# Patient Record
Sex: FEMALE | Race: WHITE | NOT HISPANIC OR LATINO | Employment: UNEMPLOYED | URBAN - NONMETROPOLITAN AREA
[De-identification: names, ages, dates, MRNs, and addresses within clinical notes are randomized per-mention and may not be internally consistent; named-entity substitution may affect disease eponyms.]

---

## 2020-05-07 ENCOUNTER — HISTORICAL (OUTPATIENT)
Dept: ADMINISTRATIVE | Facility: HOSPITAL | Age: 85
End: 2020-05-07

## 2020-05-14 LAB
ALBUMIN SERPL BCP-MCNC: 3.5 G/DL (ref 3.5–5)
ANION GAP SERPL CALC-SCNC: 8.7 MEQ/L (ref 10–20)
BUN SERPL-MCNC: 42 MG/DL (ref 7–18)
CALCIUM SERPL-MCNC: 8.8 MG/DL (ref 8.5–10.1)
CHLORIDE SERPL-SCNC: 111 MMOL/L (ref 98–107)
CHOLEST SERPL-MSCNC: 165 MG/DL (ref 0–200)
CHOLEST/HDLC SERPL: 3.4 {RATIO}
CO2 SERPL-SCNC: 27 MMOL/L (ref 22–32)
CREAT SERPL-MCNC: 1.15 MG/DL (ref 0.55–1.02)
EGFR: 48 ML/MIN/1.73M
GLUCOSE SERPL-MCNC: 151 MG/DL (ref 70–99)
HDL/CHOLESTEROL RATIO: 49 MG/DL (ref 40–60)
LDLC SERPL CALC-MCNC: 91 MG/DL (ref 0–130)
OSMOC: 297 MOSM/KG (ref 275–295)
PHOSPHATE FLD-MCNC: 4.02 MG/DL (ref 2.5–4.9)
POTASSIUM SERPL-SCNC: 4.7 MMOL/L (ref 3.5–5.1)
SODIUM BLD-SCNC: 142 MMOL/L (ref 136–145)
TRIGL SERPL-MCNC: 124 MG/ML (ref 30–150)

## 2020-06-21 ENCOUNTER — HISTORICAL (OUTPATIENT)
Dept: ADMINISTRATIVE | Facility: HOSPITAL | Age: 85
End: 2020-06-21

## 2020-06-21 LAB
APPEARANCE, UA: CLEAR
BACTERIA SPEC CULT: NORMAL /HPF
BILIRUB UR QL STRIP: NEGATIVE MG/DL
BUDDING YEAST: NORMAL /HPF
CASTS, URINE MICROSCOPIC: 6 /LPF
COLOR UR: YELLOW
EPITHELIAL, URINE MICROSCOPIC: NEGATIVE /HPF
GLUCOSE (UA): NEGATIVE MG/DL
HGB UR QL STRIP: NEGATIVE ERY/UL
KETONES UR QL STRIP: NEGATIVE MG/DL
LEUKOCYTE ESTERASE UR QL STRIP: NORMAL LEU/UL
NITRITE UR QL STRIP: NEGATIVE
PH UR STRIP: 5 [PH] (ref 5–7.5)
PROT UR QL STRIP: NEGATIVE MG/DL
RBC #/AREA URNS HPF: NEGATIVE /HPF
SP GR UR STRIP: 1.02 (ref 1–1.03)
SPERM, URINE MICROSCOPIC: NORMAL /HPF
TYPE OF SPECIMEN  (UA): NORMAL
UNCLASSIFIED CRYSTALS, UA: NORMAL /HPF
UROBILINOGEN UR STRIP-ACNC: 1 EU/L
WBC #/AREA URNS HPF: 90 /HPF

## 2020-06-23 LAB
URINE CULTURE, ROUTINE: NORMAL

## 2020-07-09 LAB — SARS-COV-2 RNA RESP QL NAA+PROBE: DETECTED

## 2020-08-28 ENCOUNTER — LAB VISIT (OUTPATIENT)
Dept: LAB | Facility: HOSPITAL | Age: 85
End: 2020-08-28
Attending: INTERNAL MEDICINE
Payer: MEDICARE

## 2020-08-28 DIAGNOSIS — R73.09 IMPAIRED GLUCOSE TOLERANCE TEST: Primary | ICD-10-CM

## 2020-08-28 LAB
ESTIMATED AVG GLUCOSE: 171 MG/DL (ref 68–131)
HBA1C MFR BLD HPLC: 7.6 % (ref 4–5.6)

## 2020-08-28 PROCEDURE — 36415 COLL VENOUS BLD VENIPUNCTURE: CPT

## 2020-08-28 PROCEDURE — 83036 HEMOGLOBIN GLYCOSYLATED A1C: CPT

## 2020-10-12 PROCEDURE — 87186 SC STD MICRODIL/AGAR DIL: CPT

## 2020-10-12 PROCEDURE — 87088 URINE BACTERIA CULTURE: CPT

## 2020-10-12 PROCEDURE — 87086 URINE CULTURE/COLONY COUNT: CPT

## 2020-10-12 PROCEDURE — 87077 CULTURE AEROBIC IDENTIFY: CPT

## 2020-10-12 PROCEDURE — 81000 URINALYSIS NONAUTO W/SCOPE: CPT

## 2020-10-13 ENCOUNTER — APPOINTMENT (OUTPATIENT)
Dept: LAB | Facility: HOSPITAL | Age: 85
End: 2020-10-13
Attending: INTERNAL MEDICINE
Payer: MEDICAID

## 2020-10-13 DIAGNOSIS — N39.0 URINARY TRACT INFECTION, SITE NOT SPECIFIED: Primary | ICD-10-CM

## 2020-10-13 LAB
BACTERIA #/AREA URNS HPF: ABNORMAL /HPF
BILIRUB UR QL STRIP: NEGATIVE
CLARITY UR: ABNORMAL
COLOR UR: YELLOW
GLUCOSE UR QL STRIP: NEGATIVE
HGB UR QL STRIP: NEGATIVE
HYALINE CASTS #/AREA URNS LPF: 3 /LPF
KETONES UR QL STRIP: NEGATIVE
LEUKOCYTE ESTERASE UR QL STRIP: ABNORMAL
MICROSCOPIC COMMENT: ABNORMAL
NITRITE UR QL STRIP: NEGATIVE
PH UR STRIP: 6 [PH] (ref 5–8)
PROT UR QL STRIP: NEGATIVE
RBC #/AREA URNS HPF: 3 /HPF (ref 0–4)
SP GR UR STRIP: 1.02 (ref 1–1.03)
SQUAMOUS #/AREA URNS HPF: 2 /HPF
URN SPEC COLLECT METH UR: ABNORMAL
UROBILINOGEN UR STRIP-ACNC: 1 EU/DL
WBC #/AREA URNS HPF: 60 /HPF (ref 0–5)

## 2020-10-15 LAB — BACTERIA UR CULT: ABNORMAL

## 2021-08-03 ENCOUNTER — APPOINTMENT (OUTPATIENT)
Dept: LAB | Facility: HOSPITAL | Age: 86
End: 2021-08-03
Attending: INTERNAL MEDICINE
Payer: MEDICARE

## 2021-08-03 DIAGNOSIS — N39.0 URINARY TRACT INFECTION, SITE NOT SPECIFIED: Primary | ICD-10-CM

## 2021-08-03 LAB
BACTERIA #/AREA URNS HPF: ABNORMAL /HPF
BILIRUB UR QL STRIP: NEGATIVE
CLARITY UR: ABNORMAL
COLOR UR: YELLOW
GLUCOSE UR QL STRIP: NEGATIVE
HGB UR QL STRIP: ABNORMAL
HYALINE CASTS #/AREA URNS LPF: 0 /LPF
KETONES UR QL STRIP: NEGATIVE
LEUKOCYTE ESTERASE UR QL STRIP: ABNORMAL
MICROSCOPIC COMMENT: ABNORMAL
NITRITE UR QL STRIP: NEGATIVE
PH UR STRIP: 6 [PH] (ref 5–8)
PROT UR QL STRIP: NEGATIVE
RBC #/AREA URNS HPF: 1 /HPF (ref 0–4)
SP GR UR STRIP: 1.01 (ref 1–1.03)
SQUAMOUS #/AREA URNS HPF: 1 /HPF
URN SPEC COLLECT METH UR: ABNORMAL
UROBILINOGEN UR STRIP-ACNC: NEGATIVE EU/DL
WBC #/AREA URNS HPF: >100 /HPF (ref 0–5)

## 2021-08-03 PROCEDURE — 87086 URINE CULTURE/COLONY COUNT: CPT | Performed by: INTERNAL MEDICINE

## 2021-08-03 PROCEDURE — 87186 SC STD MICRODIL/AGAR DIL: CPT | Performed by: INTERNAL MEDICINE

## 2021-08-03 PROCEDURE — 81000 URINALYSIS NONAUTO W/SCOPE: CPT | Performed by: INTERNAL MEDICINE

## 2021-08-03 PROCEDURE — 87088 URINE BACTERIA CULTURE: CPT | Performed by: INTERNAL MEDICINE

## 2021-08-03 PROCEDURE — 87077 CULTURE AEROBIC IDENTIFY: CPT | Performed by: INTERNAL MEDICINE

## 2021-08-06 LAB — BACTERIA UR CULT: ABNORMAL

## 2021-12-24 ENCOUNTER — HOSPITAL ENCOUNTER (INPATIENT)
Facility: HOSPITAL | Age: 86
LOS: 10 days | Discharge: HOME | DRG: 193 | End: 2022-01-03
Attending: STUDENT IN AN ORGANIZED HEALTH CARE EDUCATION/TRAINING PROGRAM | Admitting: EMERGENCY MEDICINE
Payer: MEDICARE

## 2021-12-24 DIAGNOSIS — N30.00 ACUTE CYSTITIS WITHOUT HEMATURIA: ICD-10-CM

## 2021-12-24 DIAGNOSIS — N30.01 ACUTE CYSTITIS WITH HEMATURIA: ICD-10-CM

## 2021-12-24 DIAGNOSIS — R06.02 SOB (SHORTNESS OF BREATH): ICD-10-CM

## 2021-12-24 DIAGNOSIS — J90 PLEURAL EFFUSION: ICD-10-CM

## 2021-12-24 DIAGNOSIS — G93.40 ACUTE ENCEPHALOPATHY: Primary | ICD-10-CM

## 2021-12-24 DIAGNOSIS — G93.40 ENCEPHALOPATHY: ICD-10-CM

## 2021-12-24 LAB
ALBUMIN SERPL BCP-MCNC: 3 G/DL (ref 3.5–5.2)
ALP SERPL-CCNC: 105 U/L (ref 55–135)
ALT SERPL W/O P-5'-P-CCNC: 23 U/L (ref 10–44)
ANION GAP SERPL CALC-SCNC: 9 MMOL/L (ref 8–16)
AST SERPL-CCNC: 16 U/L (ref 10–40)
BACTERIA #/AREA URNS HPF: NEGATIVE /HPF
BASOPHILS # BLD AUTO: 0.03 K/UL (ref 0–0.2)
BASOPHILS NFR BLD: 0.4 % (ref 0–1.9)
BILIRUB SERPL-MCNC: 0.4 MG/DL (ref 0.1–1)
BILIRUB UR QL STRIP: NEGATIVE
BUN SERPL-MCNC: 45 MG/DL (ref 8–23)
CALCIUM SERPL-MCNC: 8.5 MG/DL (ref 8.7–10.5)
CHLORIDE SERPL-SCNC: 113 MMOL/L (ref 95–110)
CLARITY UR: ABNORMAL
CO2 SERPL-SCNC: 29 MMOL/L (ref 23–29)
COLOR UR: YELLOW
CREAT SERPL-MCNC: 1.4 MG/DL (ref 0.5–1.4)
CTP QC/QA: YES
CTP QC/QA: YES
DIFFERENTIAL METHOD: ABNORMAL
EOSINOPHIL # BLD AUTO: 0.1 K/UL (ref 0–0.5)
EOSINOPHIL NFR BLD: 1 % (ref 0–8)
ERYTHROCYTE [DISTWIDTH] IN BLOOD BY AUTOMATED COUNT: 13.5 % (ref 11.5–14.5)
EST. GFR  (AFRICAN AMERICAN): 39.8 ML/MIN/1.73 M^2
EST. GFR  (NON AFRICAN AMERICAN): 34.5 ML/MIN/1.73 M^2
GLUCOSE SERPL-MCNC: 92 MG/DL (ref 70–110)
GLUCOSE UR QL STRIP: NEGATIVE
HCT VFR BLD AUTO: 36.3 % (ref 37–48.5)
HGB BLD-MCNC: 10.8 G/DL (ref 12–16)
HGB UR QL STRIP: ABNORMAL
HYALINE CASTS #/AREA URNS LPF: 7 /LPF
IMM GRANULOCYTES # BLD AUTO: 0.04 K/UL (ref 0–0.04)
IMM GRANULOCYTES NFR BLD AUTO: 0.6 % (ref 0–0.5)
KETONES UR QL STRIP: NEGATIVE
LACTATE SERPL-SCNC: 1 MMOL/L (ref 0.5–2.2)
LEUKOCYTE ESTERASE UR QL STRIP: ABNORMAL
LYMPHOCYTES # BLD AUTO: 2 K/UL (ref 1–4.8)
LYMPHOCYTES NFR BLD: 28.2 % (ref 18–48)
MAGNESIUM SERPL-MCNC: 2 MG/DL (ref 1.6–2.6)
MCH RBC QN AUTO: 29.7 PG (ref 27–31)
MCHC RBC AUTO-ENTMCNC: 29.8 G/DL (ref 32–36)
MCV RBC AUTO: 100 FL (ref 82–98)
MICROSCOPIC COMMENT: ABNORMAL
MONOCYTES # BLD AUTO: 0.6 K/UL (ref 0.3–1)
MONOCYTES NFR BLD: 8.7 % (ref 4–15)
NEUTROPHILS # BLD AUTO: 4.3 K/UL (ref 1.8–7.7)
NEUTROPHILS NFR BLD: 61.1 % (ref 38–73)
NITRITE UR QL STRIP: NEGATIVE
NRBC BLD-RTO: 0 /100 WBC
NT-PROBNP SERPL-MCNC: 631 PG/ML (ref 5–1800)
PH UR STRIP: 7 [PH] (ref 5–8)
PHOSPHATE SERPL-MCNC: 4.3 MG/DL (ref 2.7–4.5)
PLATELET # BLD AUTO: 164 K/UL (ref 150–450)
PMV BLD AUTO: 11.1 FL (ref 9.2–12.9)
POC MOLECULAR INFLUENZA A AGN: NEGATIVE
POC MOLECULAR INFLUENZA B AGN: NEGATIVE
POCT GLUCOSE: 237 MG/DL (ref 70–110)
POCT GLUCOSE: 284 MG/DL (ref 70–110)
POTASSIUM SERPL-SCNC: 6 MMOL/L (ref 3.5–5.1)
PROT SERPL-MCNC: 6.5 G/DL (ref 6–8.4)
PROT UR QL STRIP: NEGATIVE
RBC # BLD AUTO: 3.64 M/UL (ref 4–5.4)
RBC #/AREA URNS HPF: 27 /HPF (ref 0–4)
SARS-COV-2 RDRP RESP QL NAA+PROBE: NEGATIVE
SODIUM SERPL-SCNC: 151 MMOL/L (ref 136–145)
SP GR UR STRIP: 1.01 (ref 1–1.03)
SQUAMOUS #/AREA URNS HPF: 2 /HPF
TROPONIN I SERPL DL<=0.01 NG/ML-MCNC: 17.6 PG/ML (ref 0–60)
URN SPEC COLLECT METH UR: ABNORMAL
UROBILINOGEN UR STRIP-ACNC: 1 EU/DL
WBC # BLD AUTO: 7.03 K/UL (ref 3.9–12.7)
WBC #/AREA URNS HPF: >100 /HPF (ref 0–5)

## 2021-12-24 PROCEDURE — 25000242 PHARM REV CODE 250 ALT 637 W/ HCPCS: Performed by: STUDENT IN AN ORGANIZED HEALTH CARE EDUCATION/TRAINING PROGRAM

## 2021-12-24 PROCEDURE — 93010 EKG 12-LEAD: ICD-10-PCS | Mod: ,,, | Performed by: INTERNAL MEDICINE

## 2021-12-24 PROCEDURE — 85025 COMPLETE CBC W/AUTO DIFF WBC: CPT | Performed by: STUDENT IN AN ORGANIZED HEALTH CARE EDUCATION/TRAINING PROGRAM

## 2021-12-24 PROCEDURE — 25000003 PHARM REV CODE 250: Performed by: INTERNAL MEDICINE

## 2021-12-24 PROCEDURE — 63600175 PHARM REV CODE 636 W HCPCS: Performed by: STUDENT IN AN ORGANIZED HEALTH CARE EDUCATION/TRAINING PROGRAM

## 2021-12-24 PROCEDURE — 83735 ASSAY OF MAGNESIUM: CPT | Performed by: STUDENT IN AN ORGANIZED HEALTH CARE EDUCATION/TRAINING PROGRAM

## 2021-12-24 PROCEDURE — 25000003 PHARM REV CODE 250: Performed by: STUDENT IN AN ORGANIZED HEALTH CARE EDUCATION/TRAINING PROGRAM

## 2021-12-24 PROCEDURE — U0002 COVID-19 LAB TEST NON-CDC: HCPCS | Performed by: STUDENT IN AN ORGANIZED HEALTH CARE EDUCATION/TRAINING PROGRAM

## 2021-12-24 PROCEDURE — 27000190 HC CPAP FULL FACE MASK W/VALVE

## 2021-12-24 PROCEDURE — 99900031 HC PATIENT EDUCATION (STAT)

## 2021-12-24 PROCEDURE — 80053 COMPREHEN METABOLIC PANEL: CPT | Performed by: STUDENT IN AN ORGANIZED HEALTH CARE EDUCATION/TRAINING PROGRAM

## 2021-12-24 PROCEDURE — 11000001 HC ACUTE MED/SURG PRIVATE ROOM

## 2021-12-24 PROCEDURE — 83880 ASSAY OF NATRIURETIC PEPTIDE: CPT | Performed by: STUDENT IN AN ORGANIZED HEALTH CARE EDUCATION/TRAINING PROGRAM

## 2021-12-24 PROCEDURE — 96375 TX/PRO/DX INJ NEW DRUG ADDON: CPT

## 2021-12-24 PROCEDURE — 84100 ASSAY OF PHOSPHORUS: CPT | Performed by: STUDENT IN AN ORGANIZED HEALTH CARE EDUCATION/TRAINING PROGRAM

## 2021-12-24 PROCEDURE — 99285 EMERGENCY DEPT VISIT HI MDM: CPT | Mod: 25

## 2021-12-24 PROCEDURE — 96367 TX/PROPH/DG ADDL SEQ IV INF: CPT

## 2021-12-24 PROCEDURE — 93005 ELECTROCARDIOGRAM TRACING: CPT

## 2021-12-24 PROCEDURE — 87086 URINE CULTURE/COLONY COUNT: CPT | Performed by: STUDENT IN AN ORGANIZED HEALTH CARE EDUCATION/TRAINING PROGRAM

## 2021-12-24 PROCEDURE — 94640 AIRWAY INHALATION TREATMENT: CPT

## 2021-12-24 PROCEDURE — 99900035 HC TECH TIME PER 15 MIN (STAT)

## 2021-12-24 PROCEDURE — 93010 ELECTROCARDIOGRAM REPORT: CPT | Mod: ,,, | Performed by: INTERNAL MEDICINE

## 2021-12-24 PROCEDURE — 81000 URINALYSIS NONAUTO W/SCOPE: CPT | Performed by: STUDENT IN AN ORGANIZED HEALTH CARE EDUCATION/TRAINING PROGRAM

## 2021-12-24 PROCEDURE — 94761 N-INVAS EAR/PLS OXIMETRY MLT: CPT

## 2021-12-24 PROCEDURE — 36415 COLL VENOUS BLD VENIPUNCTURE: CPT | Performed by: STUDENT IN AN ORGANIZED HEALTH CARE EDUCATION/TRAINING PROGRAM

## 2021-12-24 PROCEDURE — 96365 THER/PROPH/DIAG IV INF INIT: CPT

## 2021-12-24 PROCEDURE — 82803 BLOOD GASES ANY COMBINATION: CPT

## 2021-12-24 PROCEDURE — P9612 CATHETERIZE FOR URINE SPEC: HCPCS

## 2021-12-24 PROCEDURE — C9399 UNCLASSIFIED DRUGS OR BIOLOG: HCPCS | Performed by: INTERNAL MEDICINE

## 2021-12-24 PROCEDURE — 84484 ASSAY OF TROPONIN QUANT: CPT | Performed by: STUDENT IN AN ORGANIZED HEALTH CARE EDUCATION/TRAINING PROGRAM

## 2021-12-24 PROCEDURE — 27000221 HC OXYGEN, UP TO 24 HOURS

## 2021-12-24 RX ORDER — BISACODYL 5 MG
5 TABLET, DELAYED RELEASE (ENTERIC COATED) ORAL DAILY PRN
Status: DISCONTINUED | OUTPATIENT
Start: 2021-12-24 | End: 2022-01-03 | Stop reason: HOSPADM

## 2021-12-24 RX ORDER — SPIRONOLACTONE 50 MG/1
50 TABLET, FILM COATED ORAL 2 TIMES DAILY
Status: ON HOLD | COMMUNITY
End: 2022-01-03 | Stop reason: HOSPADM

## 2021-12-24 RX ORDER — ACETAMINOPHEN 325 MG/1
650 TABLET ORAL 2 TIMES DAILY
COMMUNITY

## 2021-12-24 RX ORDER — INSULIN LISPRO 100 [IU]/ML
INJECTION, SOLUTION INTRAVENOUS; SUBCUTANEOUS
Status: ON HOLD | COMMUNITY
End: 2022-01-01 | Stop reason: HOSPADM

## 2021-12-24 RX ORDER — VALACYCLOVIR HYDROCHLORIDE 500 MG/1
500 TABLET, FILM COATED ORAL 2 TIMES DAILY
Status: DISCONTINUED | OUTPATIENT
Start: 2021-12-24 | End: 2021-12-27

## 2021-12-24 RX ORDER — FAMOTIDINE 10 MG/ML
20 INJECTION INTRAVENOUS EVERY 12 HOURS
Status: DISCONTINUED | OUTPATIENT
Start: 2021-12-24 | End: 2021-12-25

## 2021-12-24 RX ORDER — OXYCODONE AND ACETAMINOPHEN 5; 325 MG/1; MG/1
1 TABLET ORAL EVERY 4 HOURS PRN
Status: DISCONTINUED | OUTPATIENT
Start: 2021-12-24 | End: 2022-01-03 | Stop reason: HOSPADM

## 2021-12-24 RX ORDER — SERTRALINE HYDROCHLORIDE 50 MG/1
50 TABLET, FILM COATED ORAL DAILY
Status: ON HOLD | COMMUNITY
End: 2022-01-01 | Stop reason: HOSPADM

## 2021-12-24 RX ORDER — LOPERAMIDE HYDROCHLORIDE 2 MG/1
2 CAPSULE ORAL 4 TIMES DAILY
Status: DISCONTINUED | OUTPATIENT
Start: 2021-12-24 | End: 2021-12-27

## 2021-12-24 RX ORDER — GLUCAGON 1 MG
1 KIT INJECTION
Status: DISCONTINUED | OUTPATIENT
Start: 2021-12-24 | End: 2022-01-03 | Stop reason: HOSPADM

## 2021-12-24 RX ORDER — FENTANYL 50 UG/1
1 PATCH TRANSDERMAL
Status: DISCONTINUED | OUTPATIENT
Start: 2021-12-24 | End: 2022-01-03 | Stop reason: HOSPADM

## 2021-12-24 RX ORDER — SIMETHICONE 80 MG
1 TABLET,CHEWABLE ORAL 3 TIMES DAILY PRN
Status: DISCONTINUED | OUTPATIENT
Start: 2021-12-24 | End: 2021-12-24

## 2021-12-24 RX ORDER — LUBIPROSTONE 8 UG/1
8 CAPSULE ORAL 2 TIMES DAILY WITH MEALS
Status: DISCONTINUED | OUTPATIENT
Start: 2021-12-24 | End: 2022-01-03 | Stop reason: HOSPADM

## 2021-12-24 RX ORDER — ONDANSETRON 2 MG/ML
4 INJECTION INTRAMUSCULAR; INTRAVENOUS EVERY 8 HOURS PRN
Status: DISCONTINUED | OUTPATIENT
Start: 2021-12-24 | End: 2022-01-03 | Stop reason: HOSPADM

## 2021-12-24 RX ORDER — IBUPROFEN 200 MG
16 TABLET ORAL
Status: DISCONTINUED | OUTPATIENT
Start: 2021-12-24 | End: 2022-01-03 | Stop reason: HOSPADM

## 2021-12-24 RX ORDER — FENTANYL 50 UG/1
PATCH TRANSDERMAL
Status: DISPENSED
Start: 2021-12-24 | End: 2021-12-25

## 2021-12-24 RX ORDER — LEVOTHYROXINE SODIUM 125 UG/1
125 TABLET ORAL
Status: DISCONTINUED | OUTPATIENT
Start: 2021-12-25 | End: 2022-01-03 | Stop reason: HOSPADM

## 2021-12-24 RX ORDER — TALC
6 POWDER (GRAM) TOPICAL NIGHTLY PRN
Status: DISCONTINUED | OUTPATIENT
Start: 2021-12-24 | End: 2022-01-03 | Stop reason: HOSPADM

## 2021-12-24 RX ORDER — OXYCODONE AND ACETAMINOPHEN 5; 325 MG/1; MG/1
1 TABLET ORAL EVERY 4 HOURS PRN
Status: ON HOLD | COMMUNITY
End: 2022-01-01 | Stop reason: HOSPADM

## 2021-12-24 RX ORDER — ASPIRIN 81 MG/1
81 TABLET ORAL DAILY
Status: ON HOLD | COMMUNITY
End: 2022-01-01 | Stop reason: HOSPADM

## 2021-12-24 RX ORDER — LUBIPROSTONE 8 UG/1
8 CAPSULE ORAL 2 TIMES DAILY WITH MEALS
Status: ON HOLD | COMMUNITY
End: 2022-01-01 | Stop reason: HOSPADM

## 2021-12-24 RX ORDER — VALACYCLOVIR HYDROCHLORIDE 500 MG/1
500 TABLET, FILM COATED ORAL 2 TIMES DAILY
Status: ON HOLD | COMMUNITY
End: 2022-01-03 | Stop reason: HOSPADM

## 2021-12-24 RX ORDER — ATORVASTATIN CALCIUM 10 MG/1
10 TABLET, FILM COATED ORAL DAILY
Status: DISCONTINUED | OUTPATIENT
Start: 2021-12-25 | End: 2022-01-03 | Stop reason: HOSPADM

## 2021-12-24 RX ORDER — AMOXICILLIN 875 MG/1
875 TABLET, FILM COATED ORAL 2 TIMES DAILY
Status: ON HOLD | COMMUNITY
End: 2022-01-03 | Stop reason: HOSPADM

## 2021-12-24 RX ORDER — ALBUTEROL SULFATE 0.83 MG/ML
SOLUTION RESPIRATORY (INHALATION)
COMMUNITY
Start: 2021-12-21 | End: 2022-01-01

## 2021-12-24 RX ORDER — SERTRALINE HYDROCHLORIDE 50 MG/1
50 TABLET, FILM COATED ORAL DAILY
Status: DISCONTINUED | OUTPATIENT
Start: 2021-12-25 | End: 2021-12-27

## 2021-12-24 RX ORDER — GUAIFENESIN 100 MG/5ML
200 SOLUTION ORAL 3 TIMES DAILY PRN
Status: DISCONTINUED | OUTPATIENT
Start: 2021-12-24 | End: 2021-12-26

## 2021-12-24 RX ORDER — NITROFURANTOIN MACROCRYSTALS 50 MG/1
50 CAPSULE ORAL DAILY
Status: ON HOLD | COMMUNITY
End: 2022-01-01 | Stop reason: HOSPADM

## 2021-12-24 RX ORDER — FENTANYL 50 UG/1
1 PATCH TRANSDERMAL
COMMUNITY
End: 2022-03-21 | Stop reason: SDUPTHER

## 2021-12-24 RX ORDER — ACETAMINOPHEN 325 MG/1
650 TABLET ORAL EVERY 8 HOURS PRN
Status: DISCONTINUED | OUTPATIENT
Start: 2021-12-24 | End: 2022-01-03 | Stop reason: HOSPADM

## 2021-12-24 RX ORDER — SIMETHICONE 80 MG
1 TABLET,CHEWABLE ORAL 3 TIMES DAILY PRN
Status: DISCONTINUED | OUTPATIENT
Start: 2021-12-24 | End: 2022-01-03 | Stop reason: HOSPADM

## 2021-12-24 RX ORDER — BISACODYL 5 MG
5 TABLET, DELAYED RELEASE (ENTERIC COATED) ORAL DAILY PRN
COMMUNITY

## 2021-12-24 RX ORDER — FLUTICASONE PROPIONATE 50 MCG
1 SPRAY, SUSPENSION (ML) NASAL DAILY
Status: ON HOLD | COMMUNITY
End: 2022-01-01 | Stop reason: HOSPADM

## 2021-12-24 RX ORDER — PREGABALIN 50 MG/1
50 CAPSULE ORAL 3 TIMES DAILY
Status: DISCONTINUED | OUTPATIENT
Start: 2021-12-24 | End: 2021-12-27

## 2021-12-24 RX ORDER — SPIRONOLACTONE 25 MG/1
50 TABLET ORAL 2 TIMES DAILY
Status: DISCONTINUED | OUTPATIENT
Start: 2021-12-24 | End: 2021-12-27

## 2021-12-24 RX ORDER — LEVOTHYROXINE SODIUM 125 UG/1
150 TABLET ORAL
COMMUNITY

## 2021-12-24 RX ORDER — PRAMIPEXOLE DIHYDROCHLORIDE 1 MG/1
1 TABLET ORAL 3 TIMES DAILY
Status: ON HOLD | COMMUNITY
End: 2022-01-01 | Stop reason: HOSPADM

## 2021-12-24 RX ORDER — FUROSEMIDE 40 MG/1
40 TABLET ORAL 2 TIMES DAILY
Status: ON HOLD | COMMUNITY
End: 2022-01-01 | Stop reason: HOSPADM

## 2021-12-24 RX ORDER — MIRTAZAPINE 7.5 MG/1
7.5 TABLET, FILM COATED ORAL NIGHTLY
Status: DISCONTINUED | OUTPATIENT
Start: 2021-12-24 | End: 2022-01-03 | Stop reason: HOSPADM

## 2021-12-24 RX ORDER — DONEPEZIL HYDROCHLORIDE 10 MG/1
10 TABLET, FILM COATED ORAL NIGHTLY
COMMUNITY

## 2021-12-24 RX ORDER — GUAIFENESIN 100 MG/5ML
200 SOLUTION ORAL 3 TIMES DAILY PRN
Status: ON HOLD | COMMUNITY
End: 2022-01-01 | Stop reason: HOSPADM

## 2021-12-24 RX ORDER — SIMVASTATIN 20 MG/1
20 TABLET, FILM COATED ORAL NIGHTLY
COMMUNITY

## 2021-12-24 RX ORDER — INSULIN ASPART 100 [IU]/ML
1-10 INJECTION, SOLUTION INTRAVENOUS; SUBCUTANEOUS
Status: DISCONTINUED | OUTPATIENT
Start: 2021-12-24 | End: 2022-01-03 | Stop reason: HOSPADM

## 2021-12-24 RX ORDER — ALBUTEROL SULFATE 2.5 MG/.5ML
2.5 SOLUTION RESPIRATORY (INHALATION)
Status: DISCONTINUED | OUTPATIENT
Start: 2021-12-24 | End: 2022-01-03 | Stop reason: HOSPADM

## 2021-12-24 RX ORDER — ENOXAPARIN SODIUM 100 MG/ML
40 INJECTION SUBCUTANEOUS EVERY 24 HOURS
Status: DISCONTINUED | OUTPATIENT
Start: 2021-12-24 | End: 2022-01-03 | Stop reason: HOSPADM

## 2021-12-24 RX ORDER — PRAMIPEXOLE DIHYDROCHLORIDE 0.5 MG/1
1 TABLET ORAL 3 TIMES DAILY
Status: DISCONTINUED | OUTPATIENT
Start: 2021-12-24 | End: 2021-12-27

## 2021-12-24 RX ORDER — PANTOPRAZOLE SODIUM 40 MG/1
40 TABLET, DELAYED RELEASE ORAL DAILY
COMMUNITY

## 2021-12-24 RX ORDER — LOPERAMIDE HCL 2 MG
2 TABLET ORAL 4 TIMES DAILY PRN
Status: ON HOLD | COMMUNITY
End: 2022-01-01 | Stop reason: HOSPADM

## 2021-12-24 RX ORDER — ASPIRIN 81 MG/1
81 TABLET ORAL DAILY
Status: DISCONTINUED | OUTPATIENT
Start: 2021-12-25 | End: 2022-01-03 | Stop reason: HOSPADM

## 2021-12-24 RX ORDER — MIRTAZAPINE 7.5 MG/1
7.5 TABLET, FILM COATED ORAL NIGHTLY
Status: ON HOLD | COMMUNITY
End: 2022-01-01 | Stop reason: HOSPADM

## 2021-12-24 RX ORDER — IBUPROFEN 200 MG
24 TABLET ORAL
Status: DISCONTINUED | OUTPATIENT
Start: 2021-12-24 | End: 2022-01-03 | Stop reason: HOSPADM

## 2021-12-24 RX ORDER — SODIUM CHLORIDE 0.9 % (FLUSH) 0.9 %
10 SYRINGE (ML) INJECTION
Status: DISCONTINUED | OUTPATIENT
Start: 2021-12-24 | End: 2022-01-03 | Stop reason: HOSPADM

## 2021-12-24 RX ORDER — IPRATROPIUM BROMIDE AND ALBUTEROL SULFATE 2.5; .5 MG/3ML; MG/3ML
3 SOLUTION RESPIRATORY (INHALATION)
Status: COMPLETED | OUTPATIENT
Start: 2021-12-24 | End: 2021-12-24

## 2021-12-24 RX ORDER — ALBUTEROL SULFATE 0.83 MG/ML
2.5 SOLUTION RESPIRATORY (INHALATION) EVERY 6 HOURS
Status: DISCONTINUED | OUTPATIENT
Start: 2021-12-24 | End: 2021-12-24

## 2021-12-24 RX ORDER — ACETAMINOPHEN 325 MG/1
650 TABLET ORAL 2 TIMES DAILY
Status: DISCONTINUED | OUTPATIENT
Start: 2021-12-24 | End: 2022-01-03 | Stop reason: HOSPADM

## 2021-12-24 RX ORDER — DONEPEZIL HYDROCHLORIDE 5 MG/1
10 TABLET, FILM COATED ORAL NIGHTLY
Status: DISCONTINUED | OUTPATIENT
Start: 2021-12-24 | End: 2022-01-03 | Stop reason: HOSPADM

## 2021-12-24 RX ORDER — BACLOFEN 10 MG/1
10 TABLET ORAL 3 TIMES DAILY
Status: ON HOLD | COMMUNITY
End: 2022-01-01 | Stop reason: HOSPADM

## 2021-12-24 RX ORDER — FUROSEMIDE 10 MG/ML
80 INJECTION INTRAMUSCULAR; INTRAVENOUS
Status: COMPLETED | OUTPATIENT
Start: 2021-12-24 | End: 2021-12-24

## 2021-12-24 RX ORDER — PREGABALIN 50 MG/1
50 CAPSULE ORAL 3 TIMES DAILY
Status: ON HOLD | COMMUNITY
End: 2022-01-03 | Stop reason: HOSPADM

## 2021-12-24 RX ORDER — PANTOPRAZOLE SODIUM 40 MG/1
40 TABLET, DELAYED RELEASE ORAL DAILY
Status: DISCONTINUED | OUTPATIENT
Start: 2021-12-25 | End: 2022-01-03 | Stop reason: HOSPADM

## 2021-12-24 RX ORDER — FLUTICASONE PROPIONATE 50 MCG
1 SPRAY, SUSPENSION (ML) NASAL DAILY
Status: DISCONTINUED | OUTPATIENT
Start: 2021-12-25 | End: 2022-01-03 | Stop reason: HOSPADM

## 2021-12-24 RX ADMIN — DONEPEZIL HYDROCHLORIDE 10 MG: 5 TABLET, FILM COATED ORAL at 09:12

## 2021-12-24 RX ADMIN — PREGABALIN 50 MG: 50 CAPSULE ORAL at 09:12

## 2021-12-24 RX ADMIN — VALACYCLOVIR HYDROCHLORIDE 500 MG: 500 TABLET, FILM COATED ORAL at 09:12

## 2021-12-24 RX ADMIN — PRAMIPEXOLE DIHYDROCHLORIDE 1 MG: 0.5 TABLET ORAL at 09:12

## 2021-12-24 RX ADMIN — MIRTAZAPINE 7.5 MG: 7.5 TABLET ORAL at 09:12

## 2021-12-24 RX ADMIN — INSULIN DETEMIR 20 UNITS: 100 INJECTION, SOLUTION SUBCUTANEOUS at 09:12

## 2021-12-24 RX ADMIN — CEFTRIAXONE 1 G: 1 INJECTION, SOLUTION INTRAVENOUS at 12:12

## 2021-12-24 RX ADMIN — IPRATROPIUM BROMIDE AND ALBUTEROL SULFATE 3 ML: .5; 3 SOLUTION RESPIRATORY (INHALATION) at 10:12

## 2021-12-24 RX ADMIN — FUROSEMIDE 80 MG: 10 INJECTION, SOLUTION INTRAMUSCULAR; INTRAVENOUS at 10:12

## 2021-12-24 RX ADMIN — FENTANYL 1 PATCH: 50 PATCH, EXTENDED RELEASE TRANSDERMAL at 10:12

## 2021-12-24 RX ADMIN — IPRATROPIUM BROMIDE AND ALBUTEROL SULFATE 3 ML: .5; 3 SOLUTION RESPIRATORY (INHALATION) at 09:12

## 2021-12-24 RX ADMIN — SIMETHICONE 80 MG: 80 TABLET, CHEWABLE ORAL at 04:12

## 2021-12-24 RX ADMIN — FAMOTIDINE 20 MG: 10 INJECTION INTRAVENOUS at 09:12

## 2021-12-24 RX ADMIN — ACETAMINOPHEN 650 MG: 325 TABLET ORAL at 09:12

## 2021-12-24 RX ADMIN — CALCIUM GLUCONATE 1 G: 98 INJECTION, SOLUTION INTRAVENOUS at 10:12

## 2021-12-24 RX ADMIN — LOPERAMIDE HYDROCHLORIDE 2 MG: 2 CAPSULE ORAL at 09:12

## 2021-12-24 RX ADMIN — SPIRONOLACTONE 50 MG: 25 TABLET ORAL at 09:12

## 2021-12-24 RX ADMIN — ENOXAPARIN SODIUM 40 MG: 40 INJECTION SUBCUTANEOUS at 04:12

## 2021-12-24 NOTE — ED PROVIDER NOTES
Encounter Date: 12/24/2021       History     Chief Complaint   Patient presents with    Shortness of Breath     EMS reports SOB with disorientation x couple of days, EMS administered Duoneb and solumedrol. 18g R AC. Cbg 89.     86-year-old female history of dementia, diabetes, hypertension, obesity brought in by EMS for increased confusion and shortness of breath for the past few days.  Daughter said that patient has been having increased cough with wheezing.  Has had her COVID vaccine.  History otherwise limited due to medical condition        Review of patient's allergies indicates:   Allergen Reactions    Codeine      No past medical history on file.  No past surgical history on file.  No family history on file.     Review of Systems   Constitutional: Negative.    HENT: Negative.    Respiratory: Positive for cough, shortness of breath and wheezing.    Cardiovascular: Positive for leg swelling.   Gastrointestinal: Negative.    Genitourinary: Negative.    Musculoskeletal: Negative.    Skin: Positive for rash.   Neurological: Negative.    Psychiatric/Behavioral: Negative.    All other systems reviewed and are negative.      Physical Exam     Initial Vitals   BP Pulse Resp Temp SpO2   12/24/21 0947 12/24/21 0947 12/24/21 0947 12/24/21 0949 12/24/21 0947   (!) 142/70 76 (!) 24 98.4 °F (36.9 °C) 95 %      MAP       --                Physical Exam    Nursing note and vitals reviewed.  Constitutional: Vital signs are normal.   Drowsy but arousable. Speaking in short sentences   HENT:   Head: Normocephalic and atraumatic.   Eyes: Conjunctivae and lids are normal.   Neck: Trachea normal. Neck supple.   Cardiovascular: Normal rate, regular rhythm and normal pulses.   Pulmonary/Chest: She has wheezes. She has no rhonchi.   Decreased breath sign in the bilateral lower bases   Abdominal: Abdomen is soft. Bowel sounds are normal.   Musculoskeletal:         General: Normal range of motion.      Cervical back: Neck supple.      Neurological: She is alert and oriented to person, place, and time.   Skin: Capillary refill takes less than 2 seconds.   Bilateral lower extremity swelling with chronic venous rash without any cellulitic change   Psychiatric: She has a normal mood and affect. Her speech is normal.         ED Course   Procedures  Labs Reviewed   CBC W/ AUTO DIFFERENTIAL - Abnormal; Notable for the following components:       Result Value    RBC 3.64 (*)     Hemoglobin 10.8 (*)     Hematocrit 36.3 (*)      (*)     MCHC 29.8 (*)     Immature Granulocytes 0.6 (*)     All other components within normal limits   COMPREHENSIVE METABOLIC PANEL - Abnormal; Notable for the following components:    Sodium 151 (*)     Potassium 6.0 (*)     Chloride 113 (*)     BUN 45 (*)     Calcium 8.5 (*)     Albumin 3.0 (*)     eGFR if  39.8 (*)     eGFR if non  34.5 (*)     All other components within normal limits   URINALYSIS, REFLEX TO URINE CULTURE - Abnormal; Notable for the following components:    Appearance, UA Cloudy (*)     Occult Blood UA 2+ (*)     Leukocytes, UA 3+ (*)     All other components within normal limits    Narrative:     Preferred Collection Type->Urine, Clean Catch  Specimen Source->Urine   URINALYSIS MICROSCOPIC - Abnormal; Notable for the following components:    RBC, UA 27 (*)     WBC, UA >100 (*)     Hyaline Casts, UA 7 (*)     All other components within normal limits    Narrative:     Preferred Collection Type->Urine, Clean Catch  Specimen Source->Urine   CULTURE, URINE   TROPONIN I HIGH SENSITIVITY   NT-PRO NATRIURETIC PEPTIDE   MAGNESIUM   LACTIC ACID, PLASMA   PHOSPHORUS   SARS-COV-2 RDRP GENE    Narrative:     This test utilizes isothermal nucleic acid amplification   technology to detect the SARS-CoV-2 RdRp nucleic acid segment.   The analytical sensitivity (limit of detection) is 125 genome   equivalents/mL.   A POSITIVE result implies infection with the SARS-CoV-2 virus;   the  patient is presumed to be contagious.     A NEGATIVE result means that SARS-CoV-2 nucleic acids are not   present above the limit of detection. A NEGATIVE result should be   treated as presumptive. It does not rule out the possibility of   COVID-19 and should not be the sole basis for treatment decisions.   If COVID-19 is strongly suspected based on clinical and exposure   history, re-testing using an alternate molecular assay should be   considered.   This test is only for use under the Food and Drug   Administration s Emergency Use Authorization (EUA).   Commercial kits are provided by [employer].   Performance characteristics of the EUA have been independently   verified by [facility] Department of   Pathology and Laboratory Medicine.   _________________________________________________________________   The authorized Fact Sheet for Healthcare Providers and the authorized Fact   Sheet for Patients of the ID NOW COVID-19 are available on the FDA   website:     https://www.fda.gov/media/997179/download  https://www.fda.gov/media/797263/download         POCT INFLUENZA A/B MOLECULAR     EKG Readings: (Independently Interpreted)   Initial Reading: No STEMI. Rhythm: Normal Sinus Rhythm. Conduction: Normal. Axis: Normal.     ECG Results          EKG 12-lead (Final result)  Result time 12/24/21 09:57:42    Final result by Interface, Lab In [facility] (12/24/21 09:57:42)                 Narrative:    Test Reason : R06.02,    Vent. Rate : 078 BPM     Atrial Rate : 078 BPM     P-R Int : 204 ms          QRS Dur : 076 ms      QT Int : 358 ms       P-R-T Axes : 034 008 049 degrees     QTc Int : 408 ms    Normal sinus rhythm  Normal ECG  No previous ECGs available  Confirmed by Tal Arredondo MD ([id number]) on 12/24/2021 9:57:38 AM    Referred By: [email or web address]   SELF           Confirmed By:Tal Arredondo MD                            Imaging Results          X-Ray Chest 1 View (Final result)  Result time 12/24/21  12:10:59    Final result by Melissa Bruner MD (12/24/21 12:10:59)                 Impression:      Bibasilar pleuroparenchymal opacities suspicious for effusions with adjacent atelectasis versus pneumonia.      Electronically signed by: Melissa Bruner MD  Date:    12/24/2021  Time:    12:10             Narrative:    EXAMINATION:  XR CHEST 1 VIEW    CLINICAL HISTORY:  Shortness of breath    COMPARISON:  09/07/2019    FINDINGS:  Bibasilar pleuroparenchymal opacities obscuring the hemidiaphragms.  Prominent cardiomediastinal silhouette, though accentuated by technique and shallow inspiration.                                 Medications   cefTRIAXone (ROCEPHIN) 1 g/50 mL D5W IVPB (1 g Intravenous New Bag 12/24/21 1233)   albuterol-ipratropium 2.5 mg-0.5 mg/3 mL nebulizer solution 3 mL (3 mLs Nebulization Given 12/24/21 1010)   furosemide injection 80 mg (80 mg Intravenous Given 12/24/21 1055)   calcium gluconate 1g in dextrose 5% 100mL (ready to mix system) (0 g Intravenous Stopped 12/24/21 1213)     Medical Decision Making:   Initial Assessment:   86-year-old female history of dementia, diabetes, hypertension, obesity brought in by EMS for increased confusion and shortness of breath for the past few days.  Afebrile and vitals stable.  Saturating 94 95% on room air.  Will get labs and imaging to rule out pneumonia, infection, ACS, CHF, pulmonary edema, sepsis.  Re-evaluate  Clinical Tests:   Lab Tests: Ordered and Reviewed  The following lab test(s) were unremarkable: CBC, CMP, Troponin, Urinalysis and BNP  Radiological Study: Ordered and Reviewed  Medical Tests: Ordered and Reviewed             ED Course as of 12/24/21 1303   Fri Dec 24, 2021   1257 Labs and imaging noted.  Patient appears more confused after is sleeping. Wheezing improved after breathing treatment Patient has history of obstructive sleep apnea.  Patient may be coming hyper cap neck when sleeping.  Will place patient on BiPAP.  Will admit patient  for acute encephalopathy and uti   [HD]      ED Course User Index  [HD] Cullen Tariq MD             Clinical Impression:   Final diagnoses:  [R06.02] SOB (shortness of breath)  [J90] Pleural effusion  [N30.01] Acute cystitis with hematuria  [G93.40] Acute encephalopathy (Primary)          ED Disposition Condition    Admit               Cullen Tariq MD  12/24/21 0182

## 2021-12-25 LAB
ALBUMIN SERPL BCP-MCNC: 2.8 G/DL (ref 3.5–5.2)
ALP SERPL-CCNC: 102 U/L (ref 55–135)
ALT SERPL W/O P-5'-P-CCNC: 19 U/L (ref 10–44)
ANION GAP SERPL CALC-SCNC: 4 MMOL/L (ref 8–16)
AST SERPL-CCNC: 13 U/L (ref 10–40)
BACTERIA UR CULT: NORMAL
BASOPHILS # BLD AUTO: 0.01 K/UL (ref 0–0.2)
BASOPHILS NFR BLD: 0.1 % (ref 0–1.9)
BILIRUB SERPL-MCNC: 0.4 MG/DL (ref 0.1–1)
BUN SERPL-MCNC: 54 MG/DL (ref 8–23)
CALCIUM SERPL-MCNC: 9.1 MG/DL (ref 8.7–10.5)
CHLORIDE SERPL-SCNC: 112 MMOL/L (ref 95–110)
CO2 SERPL-SCNC: 28 MMOL/L (ref 23–29)
CREAT SERPL-MCNC: 1.4 MG/DL (ref 0.5–1.4)
DIFFERENTIAL METHOD: ABNORMAL
EOSINOPHIL # BLD AUTO: 0 K/UL (ref 0–0.5)
EOSINOPHIL NFR BLD: 0 % (ref 0–8)
ERYTHROCYTE [DISTWIDTH] IN BLOOD BY AUTOMATED COUNT: 13.4 % (ref 11.5–14.5)
EST. GFR  (AFRICAN AMERICAN): 39.8 ML/MIN/1.73 M^2
EST. GFR  (NON AFRICAN AMERICAN): 34.5 ML/MIN/1.73 M^2
GLUCOSE SERPL-MCNC: 261 MG/DL (ref 70–110)
HCT VFR BLD AUTO: 34.1 % (ref 37–48.5)
HGB BLD-MCNC: 10.2 G/DL (ref 12–16)
IMM GRANULOCYTES # BLD AUTO: 0.09 K/UL (ref 0–0.04)
IMM GRANULOCYTES NFR BLD AUTO: 1.2 % (ref 0–0.5)
LYMPHOCYTES # BLD AUTO: 0.8 K/UL (ref 1–4.8)
LYMPHOCYTES NFR BLD: 10.8 % (ref 18–48)
MCH RBC QN AUTO: 29.2 PG (ref 27–31)
MCHC RBC AUTO-ENTMCNC: 29.9 G/DL (ref 32–36)
MCV RBC AUTO: 98 FL (ref 82–98)
MONOCYTES # BLD AUTO: 0.8 K/UL (ref 0.3–1)
MONOCYTES NFR BLD: 10.6 % (ref 4–15)
NEUTROPHILS # BLD AUTO: 5.7 K/UL (ref 1.8–7.7)
NEUTROPHILS NFR BLD: 77.3 % (ref 38–73)
NRBC BLD-RTO: 0 /100 WBC
PLATELET # BLD AUTO: 172 K/UL (ref 150–450)
PMV BLD AUTO: 12.1 FL (ref 9.2–12.9)
POCT GLUCOSE: 178 MG/DL (ref 70–110)
POCT GLUCOSE: 229 MG/DL (ref 70–110)
POCT GLUCOSE: 234 MG/DL (ref 70–110)
POTASSIUM SERPL-SCNC: 5.8 MMOL/L (ref 3.5–5.1)
PROT SERPL-MCNC: 6.4 G/DL (ref 6–8.4)
RBC # BLD AUTO: 3.49 M/UL (ref 4–5.4)
SODIUM SERPL-SCNC: 144 MMOL/L (ref 136–145)
WBC # BLD AUTO: 7.38 K/UL (ref 3.9–12.7)

## 2021-12-25 PROCEDURE — 94640 AIRWAY INHALATION TREATMENT: CPT

## 2021-12-25 PROCEDURE — 25000003 PHARM REV CODE 250: Performed by: INTERNAL MEDICINE

## 2021-12-25 PROCEDURE — 36415 COLL VENOUS BLD VENIPUNCTURE: CPT | Performed by: STUDENT IN AN ORGANIZED HEALTH CARE EDUCATION/TRAINING PROGRAM

## 2021-12-25 PROCEDURE — 85025 COMPLETE CBC W/AUTO DIFF WBC: CPT | Performed by: STUDENT IN AN ORGANIZED HEALTH CARE EDUCATION/TRAINING PROGRAM

## 2021-12-25 PROCEDURE — 25000242 PHARM REV CODE 250 ALT 637 W/ HCPCS: Performed by: INTERNAL MEDICINE

## 2021-12-25 PROCEDURE — 36415 COLL VENOUS BLD VENIPUNCTURE: CPT | Performed by: INTERNAL MEDICINE

## 2021-12-25 PROCEDURE — 84145 PROCALCITONIN (PCT): CPT | Performed by: INTERNAL MEDICINE

## 2021-12-25 PROCEDURE — 11000001 HC ACUTE MED/SURG PRIVATE ROOM

## 2021-12-25 PROCEDURE — 63600175 PHARM REV CODE 636 W HCPCS: Performed by: STUDENT IN AN ORGANIZED HEALTH CARE EDUCATION/TRAINING PROGRAM

## 2021-12-25 PROCEDURE — 99900035 HC TECH TIME PER 15 MIN (STAT)

## 2021-12-25 PROCEDURE — 25000003 PHARM REV CODE 250: Performed by: STUDENT IN AN ORGANIZED HEALTH CARE EDUCATION/TRAINING PROGRAM

## 2021-12-25 PROCEDURE — 99900031 HC PATIENT EDUCATION (STAT)

## 2021-12-25 PROCEDURE — 94761 N-INVAS EAR/PLS OXIMETRY MLT: CPT

## 2021-12-25 PROCEDURE — 80053 COMPREHEN METABOLIC PANEL: CPT | Performed by: STUDENT IN AN ORGANIZED HEALTH CARE EDUCATION/TRAINING PROGRAM

## 2021-12-25 PROCEDURE — 27000221 HC OXYGEN, UP TO 24 HOURS

## 2021-12-25 RX ORDER — FAMOTIDINE 10 MG/ML
20 INJECTION INTRAVENOUS DAILY
Status: DISCONTINUED | OUTPATIENT
Start: 2021-12-26 | End: 2021-12-26

## 2021-12-25 RX ADMIN — ATORVASTATIN CALCIUM 10 MG: 10 TABLET, FILM COATED ORAL at 08:12

## 2021-12-25 RX ADMIN — VALACYCLOVIR HYDROCHLORIDE 500 MG: 500 TABLET, FILM COATED ORAL at 09:12

## 2021-12-25 RX ADMIN — PRAMIPEXOLE DIHYDROCHLORIDE 1 MG: 0.5 TABLET ORAL at 08:12

## 2021-12-25 RX ADMIN — ALBUTEROL SULFATE 2.5 MG: 2.5 SOLUTION RESPIRATORY (INHALATION) at 08:12

## 2021-12-25 RX ADMIN — ASPIRIN 81 MG: 81 TABLET, COATED ORAL at 08:12

## 2021-12-25 RX ADMIN — MIRTAZAPINE 7.5 MG: 7.5 TABLET ORAL at 09:12

## 2021-12-25 RX ADMIN — ACETAMINOPHEN 650 MG: 325 TABLET ORAL at 09:12

## 2021-12-25 RX ADMIN — PREGABALIN 50 MG: 50 CAPSULE ORAL at 09:12

## 2021-12-25 RX ADMIN — PREGABALIN 50 MG: 50 CAPSULE ORAL at 02:12

## 2021-12-25 RX ADMIN — FAMOTIDINE 20 MG: 10 INJECTION INTRAVENOUS at 09:12

## 2021-12-25 RX ADMIN — ACETAMINOPHEN 650 MG: 325 TABLET ORAL at 08:12

## 2021-12-25 RX ADMIN — LOPERAMIDE HYDROCHLORIDE 2 MG: 2 CAPSULE ORAL at 04:12

## 2021-12-25 RX ADMIN — CEFTRIAXONE 1 G: 1 INJECTION, SOLUTION INTRAVENOUS at 12:12

## 2021-12-25 RX ADMIN — VALACYCLOVIR HYDROCHLORIDE 500 MG: 500 TABLET, FILM COATED ORAL at 08:12

## 2021-12-25 RX ADMIN — PANTOPRAZOLE SODIUM 40 MG: 40 TABLET, DELAYED RELEASE ORAL at 08:12

## 2021-12-25 RX ADMIN — DONEPEZIL HYDROCHLORIDE 10 MG: 5 TABLET, FILM COATED ORAL at 09:12

## 2021-12-25 RX ADMIN — ENOXAPARIN SODIUM 40 MG: 40 INJECTION SUBCUTANEOUS at 04:12

## 2021-12-25 RX ADMIN — ALBUTEROL SULFATE 2.5 MG: 2.5 SOLUTION RESPIRATORY (INHALATION) at 07:12

## 2021-12-25 RX ADMIN — SPIRONOLACTONE 50 MG: 25 TABLET ORAL at 09:12

## 2021-12-25 RX ADMIN — SPIRONOLACTONE 50 MG: 25 TABLET ORAL at 08:12

## 2021-12-25 RX ADMIN — INSULIN DETEMIR 20 UNITS: 100 INJECTION, SOLUTION SUBCUTANEOUS at 09:12

## 2021-12-25 RX ADMIN — PRAMIPEXOLE DIHYDROCHLORIDE 1 MG: 0.5 TABLET ORAL at 02:12

## 2021-12-25 RX ADMIN — PRAMIPEXOLE DIHYDROCHLORIDE 1 MG: 0.5 TABLET ORAL at 09:12

## 2021-12-25 RX ADMIN — LOPERAMIDE HYDROCHLORIDE 2 MG: 2 CAPSULE ORAL at 08:12

## 2021-12-25 RX ADMIN — FLUTICASONE PROPIONATE 50 MCG: 50 SPRAY, METERED NASAL at 08:12

## 2021-12-25 RX ADMIN — LEVOTHYROXINE SODIUM 125 MCG: 125 TABLET ORAL at 05:12

## 2021-12-25 RX ADMIN — PREGABALIN 50 MG: 50 CAPSULE ORAL at 08:12

## 2021-12-25 RX ADMIN — ALBUTEROL SULFATE 2.5 MG: 2.5 SOLUTION RESPIRATORY (INHALATION) at 01:12

## 2021-12-25 RX ADMIN — LOPERAMIDE HYDROCHLORIDE 2 MG: 2 CAPSULE ORAL at 12:12

## 2021-12-25 RX ADMIN — LOPERAMIDE HYDROCHLORIDE 2 MG: 2 CAPSULE ORAL at 09:12

## 2021-12-25 RX ADMIN — SERTRALINE HYDROCHLORIDE 50 MG: 50 TABLET ORAL at 08:12

## 2021-12-25 NOTE — CARE UPDATE
12/24/21 2000   Patient Assessment/Suction   Level of Consciousness (AVPU) alert   Respiratory Effort Unlabored   Expansion/Accessory Muscles/Retractions no use of accessory muscles   All Lung Fields Breath Sounds crackles, coarse   Rhythm/Pattern, Respiratory unlabored   Cough Frequency infrequent   Cough Type no productive sputum   PRE-TX-O2   O2 Device (Oxygen Therapy) nasal cannula   $ Is the patient on Low Flow Oxygen? Yes   Flow (L/min) (S)  4   Oxygen Concentration (%) 36   SpO2 (!) 93 %   Pulse Oximetry Type Intermittent   $ Pulse Oximetry - Multiple Charge Pulse Oximetry - Multiple   Pulse 94   Resp 20   Aerosol Therapy   $ Aerosol Therapy Charges Other (see comments)  (tx not given due to med not available in pixis)   Ready to Wean/Extubation Screen   FIO2<=50 (chart decimal) 0.36   Preset CPAP/BiPAP Settings   Mode Of Delivery Standby   Respiratory Evaluation   $ Care Plan Tech Time 15 min   Patient found on 4lpm with no distresss noted

## 2021-12-25 NOTE — PROGRESS NOTES
[facility]    History & Physical      Patient Name: Laisha Acosta  MRN: [id number]  Admission Date: 12/24/2021  Attending Physician: Abdiel Macias DO  Primary Care Provider: Jo Underwood MD       Patient information was obtained from relative(s) and ER records.     Subjective:     Principal Problem: AMS    Chief Complaint:   Chief Complaint   Patient presents with    Shortness of Breath     EMS reports SOB with disorientation x couple of days, EMS administered Duoneb and solumedrol. 18g R AC. Cbg 89.        HPI: 86-year-old female history of dementia, diabetes, hypertension, obesity brought in by EMS for increased confusion and shortness of breath for the past few days.  Daughter said that patient has been having increased cough with wheezing.  Has had her COVID vaccine.  History otherwise limited due to medical condition    Past Medical History:   Diagnosis Date    Encephalopathy 12/24/2021       No past surgical history on file.    Review of patient's allergies indicates:   Allergen Reactions    Codeine        No current facility-administered medications on file prior to encounter.     Current Outpatient Medications on File Prior to Encounter   Medication Sig    acetaminophen (TYLENOL) 325 MG tablet Take 650 mg by mouth 2 (two) times daily.    albuterol (PROVENTIL) 2.5 mg /3 mL (0.083 %) nebulizer solution Take by nebulization.    amoxicillin (AMOXIL) 875 MG tablet Take 875 mg by mouth 2 (two) times daily.    aspirin (ECOTRIN) 81 MG EC tablet Take 81 mg by mouth once daily.    baclofen (LIORESAL) 10 MG tablet Take 10 mg by mouth 3 (three) times daily.    bisacodyL (DULCOLAX) 5 mg EC tablet Take 5 mg by mouth daily as needed for Constipation.    donepeziL (ARICEPT) 10 MG tablet Take 10 mg by mouth every evening.    fentaNYL (DURAGESIC) 50 mcg/hr Place 1 patch onto the skin every 72 hours.    fluticasone propionate (FLONASE) 50 mcg/actuation nasal spray 1 spray by Each Nostril route  once daily.    furosemide (LASIX) 40 MG tablet Take 40 mg by mouth 2 (two) times daily.    guaiFENesin 100 mg/5 ml (ROBITUSSIN) 100 mg/5 mL syrup Take 200 mg by mouth 3 (three) times daily as needed for Cough.    insulin glargine,hum.rec.anlog (LANTUS SUBQ) Inject into the skin.    insulin lispro 100 unit/mL injection Inject into the skin 3 (three) times daily before meals.    levothyroxine (SYNTHROID) 125 MCG tablet Take 125 mcg by mouth before breakfast.    loperamide (IMODIUM A-D) 2 mg Tab Take 2 mg by mouth 4 (four) times daily as needed.    lubiprostone (AMITIZA) 8 MCG Cap Take 8 mcg by mouth 2 (two) times daily with meals.    mirtazapine (REMERON) 7.5 MG Tab Take 7.5 mg by mouth every evening.    nitrofurantoin (MACRODANTIN) 50 MG capsule Take 50 mg by mouth Daily.    oxyCODONE-acetaminophen (PERCOCET) 5-325 mg per tablet Take 1 tablet by mouth every 4 (four) hours as needed for Pain.    pantoprazole (PROTONIX) 40 MG tablet Take 40 mg by mouth once daily.    pramipexole (MIRAPEX) 1 MG tablet Take 1 mg by mouth 3 (three) times daily.    pregabalin (LYRICA) 50 MG capsule Take 50 mg by mouth 3 (three) times daily.    sertraline (ZOLOFT) 50 MG tablet Take 50 mg by mouth once daily.    simvastatin (ZOCOR) 20 MG tablet Take 20 mg by mouth every evening.    spironolactone (ALDACTONE) 50 MG tablet Take 50 mg by mouth 2 (two) times a day.    valACYclovir (VALTREX) 500 MG tablet Take 500 mg by mouth 2 (two) times daily.     Family History    None       Tobacco Use    Smoking status: Not on file    Smokeless tobacco: Not on file   Substance and Sexual Activity    Alcohol use: Not on file    Drug use: Not on file    Sexual activity: Not on file     Review of Systems   Unable to perform ROS: Mental status change     Objective:     Vitals:    12/25/21 0844 12/25/21 0850 12/25/21 1109 12/25/21 1349   BP: (!) 126/56  (!) 111/53    BP Location:   Left arm    Patient Position:   Lying    Pulse:  76 76 77    Resp:  20 18 18   Temp:   98 °F (36.7 °C)    TempSrc:   Oral    SpO2:  (!) 93%  Comment: Pt. had removed Nasal cannula. O2 sat on room air 89%.  Place Patient back on Nasal Cannula for an increased O2 sat of 93%.Courtney CLEMONS notified. 96% 97%   Weight:       Height:         Weight: 112.3 kg (247 lb 8 oz)  Body mass index is 43.84 kg/m².    Physical Exam  Constitutional:       Appearance: She is obese. She is ill-appearing and toxic-appearing.   HENT:      Head: Normocephalic and atraumatic.      Nose: Nose normal.      Mouth/Throat:      Comments: On BIPAP  Eyes:      Pupils: Pupils are equal, round, and reactive to light.   Cardiovascular:      Rate and Rhythm: Tachycardia present.      Pulses: Normal pulses.   Pulmonary:      Effort: Respiratory distress present.      Breath sounds: Wheezing and rhonchi present.   Abdominal:      General: Abdomen is flat. Bowel sounds are normal.      Palpations: Abdomen is soft.   Musculoskeletal:         General: Normal range of motion.      Cervical back: Normal range of motion and neck supple.   Skin:     General: Skin is warm.      Findings: Erythema and lesion present.   Neurological:      Mental Status: She is disoriented.   Psychiatric:      Comments: Combative            CRANIAL NERVES     CN I  cranial nerve I not tested    CN III, IV, VI   Pupils are equal, round, and reactive to light.      Significant Labs:   All pertinent labs within the past 24 hours have been reviewed.  CBC:   Recent Labs   Lab 12/24/21  1005   WBC 7.03   HGB 10.8*   HCT 36.3*          Significant Imaging: I have reviewed all pertinent imaging results/findings within the past 24 hours.    Assessment/Plan:     Active Diagnoses:    Diagnosis Date Noted POA    Encephalopathy [G93.40] 12/24/2021 Unknown    Pleural effusion [J90] 12/24/2021 Unknown    Acute cystitis [N30.00] 12/24/2021 Unknown      Problems Resolved During this Admission:     VTE Risk Mitigation (From admission, onward)          Ordered     enoxaparin injection 40 mg  Daily         12/24/21 1359     IP VTE HIGH RISK PATIENT  Once         12/24/21 1359     Place sequential compression device  Until discontinued         12/24/21 1359            1. Encephalopathy  Unclear etiology. On the differential is Infectious, Metabolic, Intracranial, Toxic etiologies. The plan for each specific etiology is as follows.      Intracranial: Structural CNS lesion was ruled out with CT scan of the head which did not show any acute intracranial abnormalities.    Infectious: We will also order procalcitonin.    Metabolic: ABG, CBC, CMP    Toxic:   other medications that she is currently taking that could account for her acute confusion.    -Will watch closely overnight and repeat morning lab values.    2. Pleural Effusions  IV Lasix given in ED.  Will monitor urine output closely  I/O last 3 completed shifts:  In: 1560 [P.O.:1560]  Out: 700 [Urine:700]  No intake/output data recorded.      3. Acute Cystitis  Day #1 of IV antibiotics  Blood cultures  Urine cultures pending  Tailor antibiotic therapy as her hospital course evolves.   Fever curve acceptable    4. Acute hypoxic Resp Failure  Likely multifactorial in the setting of OHS, and pleural eeffusions.   Plan as above.     Abdiel Macias, DO  Department of Hospital Medicine   [facility]

## 2021-12-25 NOTE — HPI
86-year-old female history of dementia, diabetes, hypertension, obesity brought in by EMS for increased confusion and shortness of breath for the past few days.  Daughter said that patient has been having increased cough with wheezing.  Has had her COVID vaccine.  History otherwise limited due to medical condition

## 2021-12-25 NOTE — ASSESSMENT & PLAN NOTE
IV diuresis, will watch her urine output closely    12/27/21 [facility] get chest xray today, stop spironolactone and start lasix due to elevated potassium, continue oxygen get abg and monitor

## 2021-12-25 NOTE — PROGRESS NOTES
Pharmacist Renal Dose Adjustment Note    Laisha Acosta is a 86 y.o. female being treated with the medication famotidine    Patient Data:    Vital Signs (Most Recent):  Temp: 98 °F (36.7 °C) (12/25/21 0752)  Pulse: 76 (12/25/21 0850)  Resp: 20 (12/25/21 0850)  BP: (!) 126/56 (12/25/21 0844)  SpO2: (!) 93 % (12/25/21 0850)   Vital Signs (72h Range):  Temp:  [97.5 °F (36.4 °C)-99 °F (37.2 °C)]   Pulse:  []   Resp:  [15-28]   BP: (121-157)/(56-91)   SpO2:  [88 %-100 %]      Recent Labs   Lab 12/24/21  1005 12/25/21  0537   CREATININE 1.4 1.4     Serum creatinine: 1.4 mg/dL 12/25/21 0537  Estimated creatinine clearance: 36.1 mL/min    Medication:famotidine dose: 20 mg frequency q12h will be changed to medication:famotidine dose:20 mg frequency:daily    Pharmacist's Name: Francy Alanis  Pharmacist's Extension: [phone]

## 2021-12-25 NOTE — H&P
[facility]    History & Physical      Patient Name: Laisha Acosta  MRN: [id number]  Admission Date: 12/24/2021  Attending Physician: Abdiel Macias DO  Primary Care Provider: Jo Underwood MD         Patient information was obtained from relative(s) and ER records.     Subjective:     Principal Problem:<principal problem not specified>    Chief Complaint:   Chief Complaint   Patient presents with    Shortness of Breath     EMS reports SOB with disorientation x couple of days, EMS administered Duoneb and solumedrol. 18g R AC. Cbg 89.        HPI: 86-year-old female history of dementia, diabetes, hypertension, obesity brought in by EMS for increased confusion and shortness of breath for the past few days.  Daughter said that patient has been having increased cough with wheezing.  Has had her COVID vaccine.  History otherwise limited due to medical condition    Past Medical History:   Diagnosis Date    Encephalopathy 12/24/2021       No past surgical history on file.    Review of patient's allergies indicates:   Allergen Reactions    Codeine        No current facility-administered medications on file prior to encounter.     Current Outpatient Medications on File Prior to Encounter   Medication Sig    acetaminophen (TYLENOL) 325 MG tablet Take 650 mg by mouth 2 (two) times daily.    albuterol (PROVENTIL) 2.5 mg /3 mL (0.083 %) nebulizer solution Take by nebulization.    amoxicillin (AMOXIL) 875 MG tablet Take 875 mg by mouth 2 (two) times daily.    aspirin (ECOTRIN) 81 MG EC tablet Take 81 mg by mouth once daily.    baclofen (LIORESAL) 10 MG tablet Take 10 mg by mouth 3 (three) times daily.    bisacodyL (DULCOLAX) 5 mg EC tablet Take 5 mg by mouth daily as needed for Constipation.    donepeziL (ARICEPT) 10 MG tablet Take 10 mg by mouth every evening.    fentaNYL (DURAGESIC) 50 mcg/hr Place 1 patch onto the skin every 72 hours.    fluticasone propionate (FLONASE) 50 mcg/actuation nasal  spray 1 spray by Each Nostril route once daily.    furosemide (LASIX) 40 MG tablet Take 40 mg by mouth 2 (two) times daily.    guaiFENesin 100 mg/5 ml (ROBITUSSIN) 100 mg/5 mL syrup Take 200 mg by mouth 3 (three) times daily as needed for Cough.    insulin glargine,hum.rec.anlog (LANTUS SUBQ) Inject into the skin.    insulin lispro 100 unit/mL injection Inject into the skin 3 (three) times daily before meals.    levothyroxine (SYNTHROID) 125 MCG tablet Take 125 mcg by mouth before breakfast.    loperamide (IMODIUM A-D) 2 mg Tab Take 2 mg by mouth 4 (four) times daily as needed.    lubiprostone (AMITIZA) 8 MCG Cap Take 8 mcg by mouth 2 (two) times daily with meals.    mirtazapine (REMERON) 7.5 MG Tab Take 7.5 mg by mouth every evening.    nitrofurantoin (MACRODANTIN) 50 MG capsule Take 50 mg by mouth Daily.    oxyCODONE-acetaminophen (PERCOCET) 5-325 mg per tablet Take 1 tablet by mouth every 4 (four) hours as needed for Pain.    pantoprazole (PROTONIX) 40 MG tablet Take 40 mg by mouth once daily.    pramipexole (MIRAPEX) 1 MG tablet Take 1 mg by mouth 3 (three) times daily.    pregabalin (LYRICA) 50 MG capsule Take 50 mg by mouth 3 (three) times daily.    sertraline (ZOLOFT) 50 MG tablet Take 50 mg by mouth once daily.    simvastatin (ZOCOR) 20 MG tablet Take 20 mg by mouth every evening.    spironolactone (ALDACTONE) 50 MG tablet Take 50 mg by mouth 2 (two) times a day.    valACYclovir (VALTREX) 500 MG tablet Take 500 mg by mouth 2 (two) times daily.     Family History    None       Tobacco Use    Smoking status: Not on file    Smokeless tobacco: Not on file   Substance and Sexual Activity    Alcohol use: Not on file    Drug use: Not on file    Sexual activity: Not on file     Review of Systems   Unable to perform ROS: Mental status change     Objective:     Vital Signs (Most Recent):  Temp: 97.5 °F (36.4 °C) (12/24/21 1636)  Pulse: 100 (12/24/21 1636)  Resp: 18 (12/24/21 1636)  BP: (!) 141/91  (12/24/21 1636)  SpO2: 95 % (12/24/21 1636) Vital Signs (24h Range):  Temp:  [97.5 °F (36.4 °C)-98.4 °F (36.9 °C)] 97.5 °F (36.4 °C)  Pulse:  [] 100  Resp:  [15-28] 18  SpO2:  [88 %-100 %] 95 %  BP: (141-157)/(65-91) 141/91     Weight: 112.3 kg (247 lb 8 oz)  Body mass index is 43.84 kg/m².    Physical Exam  Constitutional:       Appearance: She is obese. She is ill-appearing and toxic-appearing.   HENT:      Head: Normocephalic and atraumatic.      Nose: Nose normal.      Mouth/Throat:      Comments: On BIPAP  Eyes:      Pupils: Pupils are equal, round, and reactive to light.   Cardiovascular:      Rate and Rhythm: Tachycardia present.      Pulses: Normal pulses.   Pulmonary:      Effort: Respiratory distress present.      Breath sounds: Wheezing and rhonchi present.   Abdominal:      General: Abdomen is flat. Bowel sounds are normal.      Palpations: Abdomen is soft.   Musculoskeletal:         General: Normal range of motion.      Cervical back: Normal range of motion and neck supple.   Skin:     General: Skin is warm.      Findings: Erythema and lesion present.   Neurological:      Mental Status: She is disoriented.   Psychiatric:      Comments: Combative            CRANIAL NERVES     CN I  cranial nerve I not tested    CN III, IV, VI   Pupils are equal, round, and reactive to light.      Significant Labs:   All pertinent labs within the past 24 hours have been reviewed.  CBC:   Recent Labs   Lab 12/24/21  1005   WBC 7.03   HGB 10.8*   HCT 36.3*          Significant Imaging: I have reviewed all pertinent imaging results/findings within the past 24 hours.    Assessment/Plan:     Active Diagnoses:    Diagnosis Date Noted POA    Encephalopathy [G93.40] 12/24/2021 Unknown    Pleural effusion [J90] 12/24/2021 Unknown    Acute cystitis [N30.00] 12/24/2021 Unknown      Problems Resolved During this Admission:     VTE Risk Mitigation (From admission, onward)         Ordered     enoxaparin injection 40 mg   Daily         12/24/21 1359     IP VTE HIGH RISK PATIENT  Once         12/24/21 1359     Place sequential compression device  Until discontinued         12/24/21 1359            1. Encephalopathy  Unclear etiology. On the differential is Infectious, Metabolic, Intracranial, Toxic etiologies. The plan for each specific etiology is as follows.      Intracranial: Structural CNS lesion was ruled out with CT scan of the head which did not show any acute intracranial abnormalities.    Infectious: We will also order procalcitonin.    Metabolic: ABG, CBC, CMP    Toxic:   other medications that she is currently taking that could account for her acute confusion.    -Will watch closely overnight and repeat morning lab values.    2. Pleural Effusions  IV Lasix given in ED  Will monitor urine output closely    3. Acute Cystitis  IV antibiotics  Blood cultures    Abdiel Macias,   Department of Hospital Medicine   [facility]

## 2021-12-25 NOTE — PLAN OF CARE
[facility]  Initial Discharge Assessment       Primary Care Provider: Jo Underwood MD    Admission Diagnosis: SOB (shortness of breath) [R06.02]  Pleural effusion [J90]  Acute cystitis with hematuria [N30.01]  Acute encephalopathy [G93.40]    Admission Date: 12/24/2021  Expected Discharge Date:     Discharge Barriers Identified: None    Payor: MEDICARE / Plan: MEDICARE PART A & B / Product Type: Government /     Extended Emergency Contact Information  Primary Emergency Contact: SAUL GUIDO  Mobile Phone: [phone]  Relation: Daughter  Preferred language: English  Secondary Emergency Contact: DONAVAN FERGUSON  Mobile Phone: [phone]  Relation: Daughter  Preferred language: English    Discharge Plan A: Return to nursing home  Discharge Plan B: Return to Nursing Home    No Pharmacies Listed    Initial Assessment (most recent)     Adult Discharge Assessment - 12/25/21 1440        Discharge Assessment    Assessment Type Discharge Planning Assessment     Confirmed/corrected address, phone number and insurance Yes     Confirmed Demographics Correct on Facesheet     Source of Information family     If unable to respond/provide information was family/caregiver contacted? Yes     Contact Name/Number Saul Guido [phone]     When was your last doctors appointment? 12/22/21     Communicated KIANA with patient/caregiver Yes     Reason For Admission AMS     Lives With facility resident     Facility Arrived From: [facility]     Do you expect to return to your current living situation? Yes     Do you have help at home or someone to help you manage your care at home? --   Patient to return to [facility] upon discharge.  Patient is a long term resident.    Prior to hospitilization cognitive status: Not Oriented to Person;Not Oriented to Place     Current cognitive status: Unable to Assess     Walking or Climbing Stairs Difficulty ambulation difficulty, requires equipment;transferring difficulty, assistance 1 person      Mobility Management Patient is WC bound and requires of nursing staff for assistance in transferring.     Dressing/Bathing Difficulty bathing difficulty, assistance 1 person     Dressing/Bathing Management Dependent of others for ADLS     Home Accessibility wheelchair accessible     Home Layout Able to live on 1st floor     Equipment Currently Used at Home wheelchair     Readmission within 30 days? No     Patient currently being followed by outpatient case management? No     Do you take prescription medications? Yes     Do you have prescription coverage? No     Do you have any problems affording any of your prescribed medications? No     Is the patient taking medications as prescribed? yes     Who is going to help you get home at discharge? [facility] Nursing staff     How do you get to doctors appointments? agency     Are you on dialysis? No     Do you take coumadin? No     Discharge Plan A Return to nursing home     Discharge Plan B Return to Nursing Home     DME Needed Upon Discharge  none     Discharge Plan discussed with: Adult children     Discharge Barriers Identified None               DCP discussed via phone w/daughter Kenzie Guido.  Patient is a resident of [facility] and shall be returning there upon dc as group home resident.  Encouraged to contact CM with any questions or concerns.

## 2021-12-25 NOTE — ASSESSMENT & PLAN NOTE
IV antibiotics  Blood cultures pending    12/27/21 [facility] get blood cultures, continue rocephin and await final urine culture

## 2021-12-25 NOTE — HOSPITAL COURSE
12/24 Given antibiotics, admit to floor    12/27/21 [facility] patient is lethargic, complaining of butt pain. Patient mental status and not at baseline. Medication review completed and medications adjusted. Will get daily labs and chest xray this am and monitor.     12/28/21 [facility] patient is more awake and alert this am, still lethargic and falls asleep easily but improvement from yesterday. Daughter is at bedside updated on patients condition. She reports that patient has intermittent abdominal pain will get xray today     12/29/21 [facility] patient's mental status is close to baseline and denies any pain or discomfort at this time. Will continue treatment, await final cultures, continue PT    12/30/21 [facility] Patient mental status is back to baseline. She is off of oxygen, will continue with nutrition and PT    12/31/21 CG: Doing well. Mentation is okay. Complains of sores on her bottom    1/1/22 CG: Slowly improving. NO acute overnight events. Labs unremarkable.     1/2/22 CG: No acute overnight events. Labs stable.

## 2021-12-25 NOTE — UM SECONDARY REVIEW
Physician Advisor External    PA - Medical Necessity Issue    Denied Inpatient     Patient to remain inpatient for IV abx w/pending urine cultures.  Patient still has elevated K and BUN, needing monitoring.

## 2021-12-26 LAB
POCT GLUCOSE: 119 MG/DL (ref 70–110)
POCT GLUCOSE: 129 MG/DL (ref 70–110)
POCT GLUCOSE: 141 MG/DL (ref 70–110)
PROCALCITONIN SERPL IA-MCNC: 0.1 NG/ML

## 2021-12-26 PROCEDURE — 94761 N-INVAS EAR/PLS OXIMETRY MLT: CPT

## 2021-12-26 PROCEDURE — 63600175 PHARM REV CODE 636 W HCPCS: Performed by: STUDENT IN AN ORGANIZED HEALTH CARE EDUCATION/TRAINING PROGRAM

## 2021-12-26 PROCEDURE — 25000242 PHARM REV CODE 250 ALT 637 W/ HCPCS: Performed by: INTERNAL MEDICINE

## 2021-12-26 PROCEDURE — 25000003 PHARM REV CODE 250: Performed by: INTERNAL MEDICINE

## 2021-12-26 PROCEDURE — 94640 AIRWAY INHALATION TREATMENT: CPT

## 2021-12-26 PROCEDURE — 99900035 HC TECH TIME PER 15 MIN (STAT)

## 2021-12-26 PROCEDURE — 11000001 HC ACUTE MED/SURG PRIVATE ROOM

## 2021-12-26 PROCEDURE — 27000221 HC OXYGEN, UP TO 24 HOURS

## 2021-12-26 PROCEDURE — 99900031 HC PATIENT EDUCATION (STAT)

## 2021-12-26 RX ORDER — FAMOTIDINE 20 MG/1
20 TABLET, FILM COATED ORAL DAILY
Status: DISCONTINUED | OUTPATIENT
Start: 2021-12-26 | End: 2021-12-27

## 2021-12-26 RX ORDER — BENZONATATE 100 MG/1
100 CAPSULE ORAL 3 TIMES DAILY PRN
Status: DISCONTINUED | OUTPATIENT
Start: 2021-12-26 | End: 2022-01-03 | Stop reason: HOSPADM

## 2021-12-26 RX ORDER — GUAIFENESIN 100 MG/5ML
200 SOLUTION ORAL EVERY 4 HOURS PRN
Status: DISCONTINUED | OUTPATIENT
Start: 2021-12-26 | End: 2022-01-03 | Stop reason: HOSPADM

## 2021-12-26 RX ADMIN — PRAMIPEXOLE DIHYDROCHLORIDE 1 MG: 0.5 TABLET ORAL at 08:12

## 2021-12-26 RX ADMIN — PREGABALIN 50 MG: 50 CAPSULE ORAL at 02:12

## 2021-12-26 RX ADMIN — GUAIFENESIN 200 MG: 200 SOLUTION ORAL at 11:12

## 2021-12-26 RX ADMIN — FLUTICASONE PROPIONATE 50 MCG: 50 SPRAY, METERED NASAL at 10:12

## 2021-12-26 RX ADMIN — SPIRONOLACTONE 50 MG: 25 TABLET ORAL at 08:12

## 2021-12-26 RX ADMIN — ALBUTEROL SULFATE 2.5 MG: 2.5 SOLUTION RESPIRATORY (INHALATION) at 09:12

## 2021-12-26 RX ADMIN — CEFTRIAXONE 1 G: 1 INJECTION, SOLUTION INTRAVENOUS at 12:12

## 2021-12-26 RX ADMIN — LEVOTHYROXINE SODIUM 125 MCG: 125 TABLET ORAL at 05:12

## 2021-12-26 RX ADMIN — VALACYCLOVIR HYDROCHLORIDE 500 MG: 500 TABLET, FILM COATED ORAL at 08:12

## 2021-12-26 RX ADMIN — PREGABALIN 50 MG: 50 CAPSULE ORAL at 08:12

## 2021-12-26 RX ADMIN — INSULIN DETEMIR 20 UNITS: 100 INJECTION, SOLUTION SUBCUTANEOUS at 08:12

## 2021-12-26 RX ADMIN — ENOXAPARIN SODIUM 40 MG: 40 INJECTION SUBCUTANEOUS at 05:12

## 2021-12-26 RX ADMIN — ALBUTEROL SULFATE 2.5 MG: 2.5 SOLUTION RESPIRATORY (INHALATION) at 02:12

## 2021-12-26 RX ADMIN — LOPERAMIDE HYDROCHLORIDE 2 MG: 2 CAPSULE ORAL at 05:12

## 2021-12-26 RX ADMIN — ACETAMINOPHEN 650 MG: 325 TABLET ORAL at 08:12

## 2021-12-26 RX ADMIN — ASPIRIN 81 MG: 81 TABLET, COATED ORAL at 08:12

## 2021-12-26 RX ADMIN — SERTRALINE HYDROCHLORIDE 50 MG: 50 TABLET ORAL at 08:12

## 2021-12-26 RX ADMIN — FAMOTIDINE 20 MG: 20 TABLET, FILM COATED ORAL at 08:12

## 2021-12-26 RX ADMIN — BENZONATATE 100 MG: 100 CAPSULE ORAL at 11:12

## 2021-12-26 RX ADMIN — PANTOPRAZOLE SODIUM 40 MG: 40 TABLET, DELAYED RELEASE ORAL at 08:12

## 2021-12-26 RX ADMIN — ATORVASTATIN CALCIUM 10 MG: 10 TABLET, FILM COATED ORAL at 08:12

## 2021-12-26 RX ADMIN — ALBUTEROL SULFATE 2.5 MG: 2.5 SOLUTION RESPIRATORY (INHALATION) at 07:12

## 2021-12-26 RX ADMIN — DONEPEZIL HYDROCHLORIDE 10 MG: 5 TABLET, FILM COATED ORAL at 08:12

## 2021-12-26 RX ADMIN — MIRTAZAPINE 7.5 MG: 7.5 TABLET ORAL at 08:12

## 2021-12-26 RX ADMIN — LOPERAMIDE HYDROCHLORIDE 2 MG: 2 CAPSULE ORAL at 08:12

## 2021-12-26 RX ADMIN — ONDANSETRON HYDROCHLORIDE 4 MG: 2 SOLUTION INTRAMUSCULAR; INTRAVENOUS at 12:12

## 2021-12-26 RX ADMIN — PRAMIPEXOLE DIHYDROCHLORIDE 1 MG: 0.5 TABLET ORAL at 02:12

## 2021-12-26 RX ADMIN — LOPERAMIDE HYDROCHLORIDE 2 MG: 2 CAPSULE ORAL at 12:12

## 2021-12-26 NOTE — PROGRESS NOTES
[facility]    History & Physical      Patient Name: Laisha Acosta  MRN: [id number]  Admission Date: 12/24/2021  Attending Physician: Abdiel Macias DO  Primary Care Provider: Jo Underwood MD       Patient information was obtained from relative(s) and ER records.     Subjective:     Principal Problem: AMS    Chief Complaint:   Chief Complaint   Patient presents with    Shortness of Breath     EMS reports SOB with disorientation x couple of days, EMS administered Duoneb and solumedrol. 18g R AC. Cbg 89.        HPI: 86-year-old female history of dementia, diabetes, hypertension, obesity brought in by EMS for increased confusion and shortness of breath for the past few days.  Daughter said that patient has been having increased cough with wheezing.  Has had her COVID vaccine.  History otherwise limited due to medical condition    Past Medical History:   Diagnosis Date    Encephalopathy 12/24/2021       No past surgical history on file.    Review of patient's allergies indicates:   Allergen Reactions    Codeine        No current facility-administered medications on file prior to encounter.     Current Outpatient Medications on File Prior to Encounter   Medication Sig    acetaminophen (TYLENOL) 325 MG tablet Take 650 mg by mouth 2 (two) times daily.    albuterol (PROVENTIL) 2.5 mg /3 mL (0.083 %) nebulizer solution Take by nebulization.    amoxicillin (AMOXIL) 875 MG tablet Take 875 mg by mouth 2 (two) times daily.    aspirin (ECOTRIN) 81 MG EC tablet Take 81 mg by mouth once daily.    baclofen (LIORESAL) 10 MG tablet Take 10 mg by mouth 3 (three) times daily.    bisacodyL (DULCOLAX) 5 mg EC tablet Take 5 mg by mouth daily as needed for Constipation.    donepeziL (ARICEPT) 10 MG tablet Take 10 mg by mouth every evening.    fentaNYL (DURAGESIC) 50 mcg/hr Place 1 patch onto the skin every 72 hours.    fluticasone propionate (FLONASE) 50 mcg/actuation nasal spray 1 spray by Each Nostril route  once daily.    furosemide (LASIX) 40 MG tablet Take 40 mg by mouth 2 (two) times daily.    guaiFENesin 100 mg/5 ml (ROBITUSSIN) 100 mg/5 mL syrup Take 200 mg by mouth 3 (three) times daily as needed for Cough.    insulin glargine,hum.rec.anlog (LANTUS SUBQ) Inject into the skin.    insulin lispro 100 unit/mL injection Inject into the skin 3 (three) times daily before meals.    levothyroxine (SYNTHROID) 125 MCG tablet Take 125 mcg by mouth before breakfast.    loperamide (IMODIUM A-D) 2 mg Tab Take 2 mg by mouth 4 (four) times daily as needed.    lubiprostone (AMITIZA) 8 MCG Cap Take 8 mcg by mouth 2 (two) times daily with meals.    mirtazapine (REMERON) 7.5 MG Tab Take 7.5 mg by mouth every evening.    nitrofurantoin (MACRODANTIN) 50 MG capsule Take 50 mg by mouth Daily.    oxyCODONE-acetaminophen (PERCOCET) 5-325 mg per tablet Take 1 tablet by mouth every 4 (four) hours as needed for Pain.    pantoprazole (PROTONIX) 40 MG tablet Take 40 mg by mouth once daily.    pramipexole (MIRAPEX) 1 MG tablet Take 1 mg by mouth 3 (three) times daily.    pregabalin (LYRICA) 50 MG capsule Take 50 mg by mouth 3 (three) times daily.    sertraline (ZOLOFT) 50 MG tablet Take 50 mg by mouth once daily.    simvastatin (ZOCOR) 20 MG tablet Take 20 mg by mouth every evening.    spironolactone (ALDACTONE) 50 MG tablet Take 50 mg by mouth 2 (two) times a day.    valACYclovir (VALTREX) 500 MG tablet Take 500 mg by mouth 2 (two) times daily.     Family History    None       Tobacco Use    Smoking status: Not on file    Smokeless tobacco: Not on file   Substance and Sexual Activity    Alcohol use: Not on file    Drug use: Not on file    Sexual activity: Not on file     Review of Systems   Unable to perform ROS: Mental status change     Objective:     Vitals:    12/25/21 0844 12/25/21 0850 12/25/21 1109 12/25/21 1349   BP: (!) 126/56  (!) 111/53    BP Location:   Left arm    Patient Position:   Lying    Pulse:  76 76 77    Resp:  20 18 18   Temp:   98 °F (36.7 °C)    TempSrc:   Oral    SpO2:  (!) 93%  Comment: Pt. had removed Nasal cannula. O2 sat on room air 89%.  Place Patient back on Nasal Cannula for an increased O2 sat of 93%.Courtney CLEMONS notified. 96% 97%   Weight:       Height:         Weight: 112.3 kg (247 lb 8 oz)  Body mass index is 43.84 kg/m².    Physical Exam  Constitutional:       Appearance: She is obese. She is ill-appearing and toxic-appearing.   HENT:      Head: Normocephalic and atraumatic.      Nose: Nose normal.      Mouth/Throat:      Comments: On BIPAP  Eyes:      Pupils: Pupils are equal, round, and reactive to light.   Cardiovascular:      Rate and Rhythm: Tachycardia present.      Pulses: Normal pulses.   Pulmonary:      Effort: Respiratory distress present.      Breath sounds: Wheezing and rhonchi present.   Abdominal:      General: Abdomen is flat. Bowel sounds are normal.      Palpations: Abdomen is soft.   Musculoskeletal:         General: Normal range of motion.      Cervical back: Normal range of motion and neck supple.   Skin:     General: Skin is warm.      Findings: Erythema and lesion present.   Neurological:      Mental Status: She is disoriented.   Psychiatric:      Comments: Combative            CRANIAL NERVES     CN I  cranial nerve I not tested    CN III, IV, VI   Pupils are equal, round, and reactive to light.      Significant Labs:   All pertinent labs within the past 24 hours have been reviewed.  CBC:   Recent Labs   Lab 12/24/21  1005   WBC 7.03   HGB 10.8*   HCT 36.3*          Significant Imaging: I have reviewed all pertinent imaging results/findings within the past 24 hours.    Assessment/Plan:     Active Diagnoses:    Diagnosis Date Noted POA    Encephalopathy [G93.40] 12/24/2021 Unknown    Pleural effusion [J90] 12/24/2021 Unknown    Acute cystitis [N30.00] 12/24/2021 Unknown      Problems Resolved During this Admission:     VTE Risk Mitigation (From admission, onward)          Ordered     enoxaparin injection 40 mg  Daily         12/24/21 1359     IP VTE HIGH RISK PATIENT  Once         12/24/21 1359     Place sequential compression device  Until discontinued         12/24/21 1359              1. Encephalopathy  Improving.   Likely 2/2 infectious etiology  Unclear etiology. On the differential is Infectious, Metabolic, Intracranial, Toxic etiologies. The plan for each specific etiology is as follows.    Workup for encephalopathy as outlined below  Intracranial: Structural CNS lesion was ruled out with CT scan of the head which did not show any acute intracranial abnormalities.  Infectious: We will also order procalcitonin.  Metabolic: ABG, CBC, CMP  Toxic:   other medications that she is currently taking that could account for her acute confusion.  -Will watch closely overnight and repeat morning lab values.    2. Pleural Effusions  IV Lasix given in ED.  Will monitor urine output closely  Intake/Output - Last 3 Shifts       12/24 0700  12/25 0659 12/25 0700  12/26 0659 12/26 0700  12/27 0659    P.O. 1560 1120 60    IV Piggyback  50     Total Intake(mL/kg) 1560 (13.9) 1170 (10.4) 60 (0.5)    Urine (mL/kg/hr) 700 800 (0.3) 650 (0.6)    Stool 0 0     Total Output 700 800 650    Net +860 +370 -590           Urine Occurrence 2 x 2 x     Stool Occurrence 2 x 1 x               3. Acute Cystitis  Day #3 of IV antibiotics  Blood cultures  Urine cultures show susceptibility to Rocephin. Continue  Tailor antibiotic therapy as her hospital course evolves.   Fever curve acceptable    4. Acute hypoxic Resp Failure  Likely multifactorial in the setting of OHS, and pleural eeffusions.   Plan as above.     Abdiel Macias, DO  Department of Hospital Medicine   [facility]

## 2021-12-26 NOTE — PLAN OF CARE
12/26/21 1327   Medicare Message   Important Message from Medicare regarding Discharge Appeal Rights Given to patient/caregiver;Explained to patient/caregiver   Date IMM was signed 12/26/21   Time IMM was signed 2008

## 2021-12-26 NOTE — PLAN OF CARE
12/24/21 1050   Medicare Message   Important Message from Medicare regarding Discharge Appeal Rights Signed/date by patient/caregiver   Date IMM was signed 12/24/21     IMM obtained by registration.  No time noted.

## 2021-12-26 NOTE — PROGRESS NOTES
Pharmacist Intervention IV to PO Note    Laisha Acosta is a 86 y.o. female being treated with IV medication famotidine    Patient Data:    Vital Signs (Most Recent):  Temp: 98 °F (36.7 °C) (12/26/21 0718)  Pulse: (!) 56 (12/26/21 0718)  Resp: 20 (12/26/21 0718)  BP: (!) 141/63 (12/26/21 0718)  SpO2: (!) 91 % (12/26/21 0718)   Vital Signs (72h Range):  Temp:  [97.4 °F (36.3 °C)-99 °F (37.2 °C)]   Pulse:  []   Resp:  [12-28]   BP: (111-157)/(53-91)   SpO2:  [88 %-100 %]      CBC:  Recent Labs   Lab 12/24/21  1005 12/25/21  0537   WBC 7.03 7.38   RBC 3.64* 3.49*   HGB 10.8* 10.2*   HCT 36.3* 34.1*    172   * 98   MCH 29.7 29.2   MCHC 29.8* 29.9*     CMP:     Recent Labs   Lab 12/24/21  1005 12/24/21  1005 12/25/21  0537   GLU 92  --  261*   CALCIUM 8.5*   < > 9.1   ALBUMIN 3.0*  --  2.8*   PROT 6.5  --  6.4   *  --  144   K 6.0*  --  5.8*   CO2 29  --  28   *  --  112*   BUN 45*  --  54*   CREATININE 1.4  --  1.4   ALKPHOS 105  --  102   ALT 23  --  19   AST 16  --  13   BILITOT 0.4  --  0.4    < > = values in this interval not displayed.       Dietary Orders:  Diet Orders  Report           Diet diabetic [facility]; 1500 Calorie: Diabetic starting at 12/24 1816            Based on the following criteria, this patient qualifies for intravenous to oral conversion:  [x] The patients gastrointestinal tract is functioning (tolerating medications via oral or enteral route for 24 hours and tolerating food or enteral feeds for 24 hours).  [x] The patient is hemodynamically stable for 24 hours (heart rate <100 beats per minute, systolic blood pressure >99 mm Hg, and respiratory rate <20 breaths per minute).  [x] The patient shows clinical improvement (afebrile for at least 24 hours and white blood cell count downtrending or normalized). Additionally, the patient must be non-neutropenic (absolute neutrophil count >500 cells/mm3).  [x] For antimicrobials, the patient has received IV therapy for at least  24 hours.    IV medication famotidine 20 mg IV daily will be changed to oral medication famotidine 20 mg PO daily    Pharmacist's Name: Francy Alanis  Pharmacist's Extension: [phone]

## 2021-12-27 PROBLEM — Z79.899 DVT PROPHYLAXIS: Status: ACTIVE | Noted: 2021-12-27

## 2021-12-27 PROBLEM — E87.5 HYPERKALEMIA: Status: ACTIVE | Noted: 2021-12-27

## 2021-12-27 LAB
ALBUMIN SERPL BCP-MCNC: 2.9 G/DL (ref 3.5–5.2)
ALP SERPL-CCNC: 109 U/L (ref 55–135)
ALT SERPL W/O P-5'-P-CCNC: 19 U/L (ref 10–44)
ANION GAP SERPL CALC-SCNC: -1 MMOL/L (ref 8–16)
ANION GAP SERPL CALC-SCNC: 3 MMOL/L (ref 8–16)
ANION GAP SERPL CALC-SCNC: 3 MMOL/L (ref 8–16)
AST SERPL-CCNC: 12 U/L (ref 10–40)
BASOPHILS # BLD AUTO: 0.04 K/UL (ref 0–0.2)
BASOPHILS NFR BLD: 0.4 % (ref 0–1.9)
BILIRUB SERPL-MCNC: 0.5 MG/DL (ref 0.1–1)
BUN SERPL-MCNC: 57 MG/DL (ref 8–23)
BUN SERPL-MCNC: 57 MG/DL (ref 8–23)
BUN SERPL-MCNC: 58 MG/DL (ref 8–23)
CALCIUM SERPL-MCNC: 8.7 MG/DL (ref 8.7–10.5)
CALCIUM SERPL-MCNC: 8.9 MG/DL (ref 8.7–10.5)
CALCIUM SERPL-MCNC: 9 MG/DL (ref 8.7–10.5)
CHLORIDE SERPL-SCNC: 113 MMOL/L (ref 95–110)
CO2 SERPL-SCNC: 28 MMOL/L (ref 23–29)
CO2 SERPL-SCNC: 28 MMOL/L (ref 23–29)
CO2 SERPL-SCNC: 30 MMOL/L (ref 23–29)
CORRECTED TEMPERATURE (PCO2): 71.5 MMHG
CORRECTED TEMPERATURE (PH): 7.22
CORRECTED TEMPERATURE (PO2): 59.8 MMHG
CREAT SERPL-MCNC: 1.4 MG/DL (ref 0.5–1.4)
DIFFERENTIAL METHOD: ABNORMAL
EOSINOPHIL # BLD AUTO: 0.1 K/UL (ref 0–0.5)
EOSINOPHIL NFR BLD: 0.9 % (ref 0–8)
ERYTHROCYTE [DISTWIDTH] IN BLOOD BY AUTOMATED COUNT: 13.2 % (ref 11.5–14.5)
EST. GFR  (AFRICAN AMERICAN): 39.8 ML/MIN/1.73 M^2
EST. GFR  (NON AFRICAN AMERICAN): 34.5 ML/MIN/1.73 M^2
FIO2: 28 %
GLUCOSE SERPL-MCNC: 107 MG/DL (ref 70–110)
GLUCOSE SERPL-MCNC: 117 MG/DL (ref 70–110)
GLUCOSE SERPL-MCNC: 120 MG/DL (ref 70–110)
HCO3 UR-SCNC: 24.2 MMOL/L
HCT VFR BLD AUTO: 39 % (ref 37–48.5)
HGB BLD-MCNC: 11.6 G/DL (ref 12–16)
IMM GRANULOCYTES # BLD AUTO: 0.11 K/UL (ref 0–0.04)
IMM GRANULOCYTES NFR BLD AUTO: 1 % (ref 0–0.5)
LPM: 2
LYMPHOCYTES # BLD AUTO: 1 K/UL (ref 1–4.8)
LYMPHOCYTES NFR BLD: 8.9 % (ref 18–48)
Lab: ABNORMAL
MAGNESIUM SERPL-MCNC: 2.2 MG/DL (ref 1.6–2.6)
MCH RBC QN AUTO: 30.2 PG (ref 27–31)
MCHC RBC AUTO-ENTMCNC: 29.7 G/DL (ref 32–36)
MCV RBC AUTO: 102 FL (ref 82–98)
MODIFIED ALLEN'S TEST: ABNORMAL
MONOCYTES # BLD AUTO: 1.2 K/UL (ref 0.3–1)
MONOCYTES NFR BLD: 11.2 % (ref 4–15)
NEUTROPHILS # BLD AUTO: 8.4 K/UL (ref 1.8–7.7)
NEUTROPHILS NFR BLD: 77.6 % (ref 38–73)
NOTIFIED BY: ABNORMAL
NRBC BLD-RTO: 0 /100 WBC
O2DEVICE: ABNORMAL
PCO2 BLDA: 71.5 MMHG (ref 35–45)
PH SMN: 7.22 [PH] (ref 7.34–7.45)
PLATELET # BLD AUTO: 145 K/UL (ref 150–450)
PMV BLD AUTO: 11.6 FL (ref 9.2–12.9)
PO2 BLDA: 59.8 MMHG (ref 80–100)
POC BASE DEFICIT: 1.3 MMOL/L
POC NOTIFIED NOTE: ABNORMAL
POC PERFORMED BY: ABNORMAL
POC SATURATED O2: 89.4 %
POC TCO2: 27.8 MMOL/L
POC TEMPERATURE: 37 C
POCT GLUCOSE: 105 MG/DL (ref 70–110)
POCT GLUCOSE: 120 MG/DL (ref 70–110)
POCT GLUCOSE: 122 MG/DL (ref 70–110)
POTASSIUM SERPL-SCNC: 6.4 MMOL/L (ref 3.5–5.1)
POTASSIUM SERPL-SCNC: 6.5 MMOL/L (ref 3.5–5.1)
POTASSIUM SERPL-SCNC: 6.6 MMOL/L (ref 3.5–5.1)
PROT SERPL-MCNC: 6.8 G/DL (ref 6–8.4)
PROVIDER NOTIFIED: ABNORMAL
RBC # BLD AUTO: 3.84 M/UL (ref 4–5.4)
SODIUM SERPL-SCNC: 142 MMOL/L (ref 136–145)
SODIUM SERPL-SCNC: 144 MMOL/L (ref 136–145)
SODIUM SERPL-SCNC: 144 MMOL/L (ref 136–145)
SPECIMEN SOURCE: ABNORMAL
WBC # BLD AUTO: 10.82 K/UL (ref 3.9–12.7)

## 2021-12-27 PROCEDURE — 80048 BASIC METABOLIC PNL TOTAL CA: CPT | Mod: 91 | Performed by: NURSE PRACTITIONER

## 2021-12-27 PROCEDURE — 25000003 PHARM REV CODE 250: Performed by: INTERNAL MEDICINE

## 2021-12-27 PROCEDURE — 99900035 HC TECH TIME PER 15 MIN (STAT)

## 2021-12-27 PROCEDURE — 63600175 PHARM REV CODE 636 W HCPCS: Performed by: STUDENT IN AN ORGANIZED HEALTH CARE EDUCATION/TRAINING PROGRAM

## 2021-12-27 PROCEDURE — 87040 BLOOD CULTURE FOR BACTERIA: CPT | Performed by: NURSE PRACTITIONER

## 2021-12-27 PROCEDURE — 82803 BLOOD GASES ANY COMBINATION: CPT

## 2021-12-27 PROCEDURE — 25000242 PHARM REV CODE 250 ALT 637 W/ HCPCS: Performed by: INTERNAL MEDICINE

## 2021-12-27 PROCEDURE — 36600 WITHDRAWAL OF ARTERIAL BLOOD: CPT

## 2021-12-27 PROCEDURE — 25000003 PHARM REV CODE 250: Performed by: NURSE PRACTITIONER

## 2021-12-27 PROCEDURE — 36415 COLL VENOUS BLD VENIPUNCTURE: CPT | Performed by: NURSE PRACTITIONER

## 2021-12-27 PROCEDURE — 80053 COMPREHEN METABOLIC PANEL: CPT | Performed by: NURSE PRACTITIONER

## 2021-12-27 PROCEDURE — 85025 COMPLETE CBC W/AUTO DIFF WBC: CPT | Performed by: NURSE PRACTITIONER

## 2021-12-27 PROCEDURE — 99900031 HC PATIENT EDUCATION (STAT)

## 2021-12-27 PROCEDURE — 11000001 HC ACUTE MED/SURG PRIVATE ROOM

## 2021-12-27 PROCEDURE — 94640 AIRWAY INHALATION TREATMENT: CPT

## 2021-12-27 PROCEDURE — 94761 N-INVAS EAR/PLS OXIMETRY MLT: CPT

## 2021-12-27 PROCEDURE — 94660 CPAP INITIATION&MGMT: CPT

## 2021-12-27 PROCEDURE — 83735 ASSAY OF MAGNESIUM: CPT | Performed by: NURSE PRACTITIONER

## 2021-12-27 PROCEDURE — 27000221 HC OXYGEN, UP TO 24 HOURS

## 2021-12-27 PROCEDURE — 27000190 HC CPAP FULL FACE MASK W/VALVE

## 2021-12-27 RX ORDER — IPRATROPIUM BROMIDE AND ALBUTEROL SULFATE 2.5; .5 MG/3ML; MG/3ML
3 SOLUTION RESPIRATORY (INHALATION) ONCE
Status: COMPLETED | OUTPATIENT
Start: 2021-12-27 | End: 2021-12-27

## 2021-12-27 RX ORDER — SERTRALINE HYDROCHLORIDE 50 MG/1
50 TABLET, FILM COATED ORAL NIGHTLY
Status: DISCONTINUED | OUTPATIENT
Start: 2021-12-27 | End: 2021-12-27

## 2021-12-27 RX ORDER — SERTRALINE HYDROCHLORIDE 50 MG/1
50 TABLET, FILM COATED ORAL NIGHTLY
Status: DISCONTINUED | OUTPATIENT
Start: 2021-12-28 | End: 2022-01-03 | Stop reason: HOSPADM

## 2021-12-27 RX ORDER — LOPERAMIDE HYDROCHLORIDE 2 MG/1
2 CAPSULE ORAL EVERY 6 HOURS PRN
Status: DISCONTINUED | OUTPATIENT
Start: 2021-12-27 | End: 2022-01-03 | Stop reason: HOSPADM

## 2021-12-27 RX ORDER — PRAMIPEXOLE DIHYDROCHLORIDE 0.5 MG/1
1 TABLET ORAL 2 TIMES DAILY
Status: DISCONTINUED | OUTPATIENT
Start: 2021-12-27 | End: 2022-01-03 | Stop reason: HOSPADM

## 2021-12-27 RX ORDER — FUROSEMIDE 40 MG/1
40 TABLET ORAL DAILY
Status: DISCONTINUED | OUTPATIENT
Start: 2021-12-27 | End: 2022-01-03 | Stop reason: HOSPADM

## 2021-12-27 RX ADMIN — ENOXAPARIN SODIUM 40 MG: 40 INJECTION SUBCUTANEOUS at 04:12

## 2021-12-27 RX ADMIN — ATORVASTATIN CALCIUM 10 MG: 10 TABLET, FILM COATED ORAL at 08:12

## 2021-12-27 RX ADMIN — MIRTAZAPINE 7.5 MG: 7.5 TABLET ORAL at 09:12

## 2021-12-27 RX ADMIN — CEFTRIAXONE 1 G: 1 INJECTION, SOLUTION INTRAVENOUS at 12:12

## 2021-12-27 RX ADMIN — DEXTROSE MONOHYDRATE 12.5 G: 25 INJECTION, SOLUTION INTRAVENOUS at 10:12

## 2021-12-27 RX ADMIN — ALBUTEROL SULFATE 2.5 MG: 2.5 SOLUTION RESPIRATORY (INHALATION) at 07:12

## 2021-12-27 RX ADMIN — ASPIRIN 81 MG: 81 TABLET, COATED ORAL at 08:12

## 2021-12-27 RX ADMIN — FLUTICASONE PROPIONATE 50 MCG: 50 SPRAY, METERED NASAL at 08:12

## 2021-12-27 RX ADMIN — PRAMIPEXOLE DIHYDROCHLORIDE 1 MG: 0.5 TABLET ORAL at 09:12

## 2021-12-27 RX ADMIN — DONEPEZIL HYDROCHLORIDE 10 MG: 5 TABLET, FILM COATED ORAL at 09:12

## 2021-12-27 RX ADMIN — FAMOTIDINE 20 MG: 20 TABLET, FILM COATED ORAL at 08:12

## 2021-12-27 RX ADMIN — PRAMIPEXOLE DIHYDROCHLORIDE 1 MG: 0.5 TABLET ORAL at 08:12

## 2021-12-27 RX ADMIN — SODIUM ZIRCONIUM CYCLOSILICATE 5 G: 5 POWDER, FOR SUSPENSION ORAL at 04:12

## 2021-12-27 RX ADMIN — SERTRALINE HYDROCHLORIDE 50 MG: 50 TABLET ORAL at 08:12

## 2021-12-27 RX ADMIN — FUROSEMIDE 40 MG: 40 TABLET ORAL at 10:12

## 2021-12-27 RX ADMIN — ALBUTEROL SULFATE 2.5 MG: 2.5 SOLUTION RESPIRATORY (INHALATION) at 01:12

## 2021-12-27 RX ADMIN — SODIUM ZIRCONIUM CYCLOSILICATE 5 G: 5 POWDER, FOR SUSPENSION ORAL at 09:12

## 2021-12-27 RX ADMIN — ACETAMINOPHEN 650 MG: 325 TABLET ORAL at 09:12

## 2021-12-27 RX ADMIN — VALACYCLOVIR HYDROCHLORIDE 500 MG: 500 TABLET, FILM COATED ORAL at 08:12

## 2021-12-27 RX ADMIN — PANTOPRAZOLE SODIUM 40 MG: 40 TABLET, DELAYED RELEASE ORAL at 08:12

## 2021-12-27 RX ADMIN — LEVOTHYROXINE SODIUM 125 MCG: 125 TABLET ORAL at 06:12

## 2021-12-27 RX ADMIN — IPRATROPIUM BROMIDE AND ALBUTEROL SULFATE 3 ML: .5; 2.5 SOLUTION RESPIRATORY (INHALATION) at 09:12

## 2021-12-27 RX ADMIN — LOPERAMIDE HYDROCHLORIDE 2 MG: 2 CAPSULE ORAL at 08:12

## 2021-12-27 RX ADMIN — FENTANYL 1 PATCH: 50 PATCH, EXTENDED RELEASE TRANSDERMAL at 07:12

## 2021-12-27 RX ADMIN — ACETAMINOPHEN 650 MG: 325 TABLET ORAL at 08:12

## 2021-12-27 RX ADMIN — SPIRONOLACTONE 50 MG: 25 TABLET ORAL at 08:12

## 2021-12-27 RX ADMIN — PREGABALIN 50 MG: 50 CAPSULE ORAL at 08:12

## 2021-12-27 RX ADMIN — INSULIN DETEMIR 20 UNITS: 100 INJECTION, SOLUTION SUBCUTANEOUS at 09:12

## 2021-12-27 NOTE — PROGRESS NOTES
[facility] Medicine  Progress Note    Patient Name: Laisha Acosta  MRN: [id number]  Patient Class: IP- Inpatient   Admission Date: 12/24/2021  Length of Stay: 3 days  Attending Physician: Buster Nuñez III, MD  Primary Care Provider: Jo Underwood MD        Subjective:     Principal Problem:Encephalopathy        HPI:  86-year-old female history of dementia, diabetes, hypertension, obesity brought in by EMS for increased confusion and shortness of breath for the past few days.  Daughter said that patient has been having increased cough with wheezing.  Has had her COVID vaccine.  History otherwise limited due to medical condition      Overview/Hospital Course:  12/24 Given antibiotics, admit to floor    12/27/21 [facility] patient is lethargic, complaining of butt pain. Patient mental status and not at baseline. Medication review completed and medications adjusted. Will get daily labs and chest xray this am and monitor.       Interval History: see hospital course    Review of Systems   Constitutional: Positive for activity change, appetite change and fatigue.   Respiratory: Positive for shortness of breath.    Cardiovascular: Negative for chest pain and leg swelling.   Gastrointestinal: Positive for abdominal pain.   Neurological: Positive for weakness.   Psychiatric/Behavioral: Positive for confusion.     Objective:     Vital Signs (Most Recent):  Temp: 98.2 °F (36.8 °C) (12/27/21 0755)  Pulse: 84 (12/27/21 0755)  Resp: (!) 22 (12/27/21 0755)  BP: (!) 125/56 (12/27/21 0827)  SpO2: (!) 94 % (12/27/21 0755) Vital Signs (24h Range):  Temp:  [97.3 °F (36.3 °C)-98.6 °F (37 °C)] 98.2 °F (36.8 °C)  Pulse:  [69-84] 84  Resp:  [18-22] 22  SpO2:  [91 %-100 %] 94 %  BP: (124-142)/(54-64) 125/56     Weight: 112.3 kg (247 lb 8 oz)  Body mass index is 43.84 kg/m².    Intake/Output Summary (Last 24 hours) at 12/27/2021 0928  Last data filed at 12/27/2021 0600  Gross per 24 hour   Intake 1170 ml   Output 850 ml    Net 320 ml      Physical Exam  Constitutional:       Appearance: She is obese. She is ill-appearing.   HENT:      Head: Normocephalic.      Mouth/Throat:      Mouth: Mucous membranes are moist.   Cardiovascular:      Rate and Rhythm: Normal rate and regular rhythm.   Pulmonary:      Effort: Pulmonary effort is normal.      Comments: Decreased to bases   Abdominal:      General: Bowel sounds are normal. There is distension.      Palpations: Abdomen is soft.   Musculoskeletal:         General: No swelling.      Right lower leg: Edema (mild) present.      Left lower leg: Edema (mild) present.   Neurological:      Mental Status: She is lethargic and disoriented.      Motor: Weakness present.         Significant Labs:   All pertinent labs within the past 24 hours have been reviewed.  CBC: No results for input(s): WBC, HGB, HCT, PLT in the last 48 hours.  CMP: No results for input(s): NA, K, CL, CO2, GLU, BUN, CREATININE, CALCIUM, PROT, ALBUMIN, BILITOT, ALKPHOS, AST, ALT, ANIONGAP, EGFRNONAA in the last 48 hours.    Invalid input(s): ESTGFAFRICA  Lactic Acid: No results for input(s): LACTATE in the last 48 hours.  Magnesium: No results for input(s): MG in the last 48 hours.  POCT Glucose:   Recent Labs   Lab 12/26/21  1058 12/26/21  1614 12/26/21  1958   POCTGLUCOSE 119* 129* 141*     Troponin: No results for input(s): TROPONINI in the last 48 hours.  Urine Culture: No results for input(s): LABURIN in the last 48 hours.  Urine Studies: No results for input(s): COLORU, APPEARANCEUA, PHUR, SPECGRAV, PROTEINUA, GLUCUA, KETONESU, BILIRUBINUA, OCCULTUA, NITRITE, UROBILINOGEN, LEUKOCYTESUR, RBCUA, WBCUA, BACTERIA, SQUAMEPITHEL, HYALINECASTS in the last 48 hours.    Invalid input(s): WRIGHTSUR    Significant Imaging: Chest xray:   Bibasilar pleuroparenchymal opacities suspicious for effusions with adjacent atelectasis versus pneumonia.       Assessment/Plan:      * Encephalopathy  12/27/21 [facility]: patient is lethargic this am  and encephalopathy is likely multifactorial will continue to treat for UTI with Rocephin and await final urine culture, get ABG today, medications adjusted and lyrica was held due to new medication. Continue to monitor.       DVT prophylaxis  lovenox       Hyperkalemia  Stop spironolactone and start lasix and monitor labs daily      Acute cystitis  IV antibiotics  Blood cultures pending    12/27/21 [facility] get blood cultures, continue rocephin and await final urine culture      Pleural effusion    IV diuresis, will watch her urine output closely    12/27/21 [facility] get chest xray today, stop spironolactone and start lasix due to elevated potassium, continue oxygen get abg and monitor      VTE Risk Mitigation (From admission, onward)         Ordered     enoxaparin injection 40 mg  Daily         12/24/21 1359     IP VTE HIGH RISK PATIENT  Once         12/24/21 1359     Place sequential compression device  Until discontinued         12/24/21 1359                Discharge Planning   KIANA:      Code Status: Full Code   Is the patient medically ready for discharge?:     Reason for patient still in hospital (select all that apply): Patient trending condition, Laboratory test, Treatment and PT / OT recommendations  Discharge Plan A: Return to nursing home                  Bibi Strange NP  Department of Hospital Medicine   [facility]

## 2021-12-27 NOTE — ASSESSMENT & PLAN NOTE
12/27/21 [facility]: patient is lethargic this am and encephalopathy is likely multifactorial will continue to treat for UTI with Rocephin and await final urine culture, get ABG today, medications adjusted and lyrica was held due to new medication. Continue to monitor.

## 2021-12-27 NOTE — SUBJECTIVE & OBJECTIVE
Interval History: see hospital course    Review of Systems   Constitutional: Positive for activity change, appetite change and fatigue.   Respiratory: Positive for shortness of breath.    Cardiovascular: Negative for chest pain and leg swelling.   Gastrointestinal: Positive for abdominal pain.   Neurological: Positive for weakness.   Psychiatric/Behavioral: Positive for confusion.     Objective:     Vital Signs (Most Recent):  Temp: 98.2 °F (36.8 °C) (12/27/21 0755)  Pulse: 84 (12/27/21 0755)  Resp: (!) 22 (12/27/21 0755)  BP: (!) 125/56 (12/27/21 0827)  SpO2: (!) 94 % (12/27/21 0755) Vital Signs (24h Range):  Temp:  [97.3 °F (36.3 °C)-98.6 °F (37 °C)] 98.2 °F (36.8 °C)  Pulse:  [69-84] 84  Resp:  [18-22] 22  SpO2:  [91 %-100 %] 94 %  BP: (124-142)/(54-64) 125/56     Weight: 112.3 kg (247 lb 8 oz)  Body mass index is 43.84 kg/m².    Intake/Output Summary (Last 24 hours) at 12/27/2021 0928  Last data filed at 12/27/2021 0600  Gross per 24 hour   Intake 1170 ml   Output 850 ml   Net 320 ml      Physical Exam  Constitutional:       Appearance: She is obese. She is ill-appearing.   HENT:      Head: Normocephalic.      Mouth/Throat:      Mouth: Mucous membranes are moist.   Cardiovascular:      Rate and Rhythm: Normal rate and regular rhythm.   Pulmonary:      Effort: Pulmonary effort is normal.      Comments: Decreased to bases   Abdominal:      General: Bowel sounds are normal. There is distension.      Palpations: Abdomen is soft.   Musculoskeletal:         General: No swelling.      Right lower leg: Edema (mild) present.      Left lower leg: Edema (mild) present.   Neurological:      Mental Status: She is lethargic and disoriented.      Motor: Weakness present.         Significant Labs:   All pertinent labs within the past 24 hours have been reviewed.  CBC: No results for input(s): WBC, HGB, HCT, PLT in the last 48 hours.  CMP: No results for input(s): NA, K, CL, CO2, GLU, BUN, CREATININE, CALCIUM, PROT, ALBUMIN,  BILITOT, ALKPHOS, AST, ALT, ANIONGAP, EGFRNONAA in the last 48 hours.    Invalid input(s): ESTGFAFRICA  Lactic Acid: No results for input(s): LACTATE in the last 48 hours.  Magnesium: No results for input(s): MG in the last 48 hours.  POCT Glucose:   Recent Labs   Lab 12/26/21  1058 12/26/21  1614 12/26/21  1958   POCTGLUCOSE 119* 129* 141*     Troponin: No results for input(s): TROPONINI in the last 48 hours.  Urine Culture: No results for input(s): LABURIN in the last 48 hours.  Urine Studies: No results for input(s): COLORU, APPEARANCEUA, PHUR, SPECGRAV, PROTEINUA, GLUCUA, KETONESU, BILIRUBINUA, OCCULTUA, NITRITE, UROBILINOGEN, LEUKOCYTESUR, RBCUA, WBCUA, BACTERIA, SQUAMEPITHEL, HYALINECASTS in the last 48 hours.    Invalid input(s): WRIGHTSUR    Significant Imaging: Chest xray:   Bibasilar pleuroparenchymal opacities suspicious for effusions with adjacent atelectasis versus pneumonia.

## 2021-12-28 PROBLEM — E46 PROTEIN MALNUTRITION: Status: ACTIVE | Noted: 2021-12-28

## 2021-12-28 PROBLEM — R10.9 ABDOMINAL PAIN: Status: ACTIVE | Noted: 2021-12-28

## 2021-12-28 PROBLEM — J96.02 ACUTE RESPIRATORY FAILURE WITH HYPOXIA AND HYPERCARBIA: Status: ACTIVE | Noted: 2021-12-28

## 2021-12-28 PROBLEM — J96.01 ACUTE RESPIRATORY FAILURE WITH HYPOXIA AND HYPERCARBIA: Status: ACTIVE | Noted: 2021-12-28

## 2021-12-28 LAB
ALBUMIN SERPL BCP-MCNC: 2.7 G/DL (ref 3.5–5.2)
ALP SERPL-CCNC: 101 U/L (ref 55–135)
ALT SERPL W/O P-5'-P-CCNC: 16 U/L (ref 10–44)
ANION GAP SERPL CALC-SCNC: 6 MMOL/L (ref 8–16)
AST SERPL-CCNC: 10 U/L (ref 10–40)
BASOPHILS # BLD AUTO: 0.03 K/UL (ref 0–0.2)
BASOPHILS NFR BLD: 0.5 % (ref 0–1.9)
BILIRUB SERPL-MCNC: 0.5 MG/DL (ref 0.1–1)
BUN SERPL-MCNC: 59 MG/DL (ref 8–23)
CALCIUM SERPL-MCNC: 9 MG/DL (ref 8.7–10.5)
CHLORIDE SERPL-SCNC: 113 MMOL/L (ref 95–110)
CO2 SERPL-SCNC: 26 MMOL/L (ref 23–29)
CREAT SERPL-MCNC: 1.3 MG/DL (ref 0.5–1.4)
DIFFERENTIAL METHOD: ABNORMAL
EOSINOPHIL # BLD AUTO: 0 K/UL (ref 0–0.5)
EOSINOPHIL NFR BLD: 0.5 % (ref 0–8)
ERYTHROCYTE [DISTWIDTH] IN BLOOD BY AUTOMATED COUNT: 13.2 % (ref 11.5–14.5)
EST. GFR  (AFRICAN AMERICAN): 43.5 ML/MIN/1.73 M^2
EST. GFR  (NON AFRICAN AMERICAN): 37.8 ML/MIN/1.73 M^2
GLUCOSE SERPL-MCNC: 119 MG/DL (ref 70–110)
HCT VFR BLD AUTO: 35.9 % (ref 37–48.5)
HGB BLD-MCNC: 10.5 G/DL (ref 12–16)
IMM GRANULOCYTES # BLD AUTO: 0.08 K/UL (ref 0–0.04)
IMM GRANULOCYTES NFR BLD AUTO: 1.2 % (ref 0–0.5)
LYMPHOCYTES # BLD AUTO: 1 K/UL (ref 1–4.8)
LYMPHOCYTES NFR BLD: 15.1 % (ref 18–48)
MAGNESIUM SERPL-MCNC: 2.2 MG/DL (ref 1.6–2.6)
MCH RBC QN AUTO: 29.8 PG (ref 27–31)
MCHC RBC AUTO-ENTMCNC: 29.2 G/DL (ref 32–36)
MCV RBC AUTO: 102 FL (ref 82–98)
MONOCYTES # BLD AUTO: 0.7 K/UL (ref 0.3–1)
MONOCYTES NFR BLD: 10.7 % (ref 4–15)
NEUTROPHILS # BLD AUTO: 4.8 K/UL (ref 1.8–7.7)
NEUTROPHILS NFR BLD: 72 % (ref 38–73)
NRBC BLD-RTO: 0 /100 WBC
PLATELET # BLD AUTO: 126 K/UL (ref 150–450)
PMV BLD AUTO: 12.3 FL (ref 9.2–12.9)
POCT GLUCOSE: 119 MG/DL (ref 70–110)
POCT GLUCOSE: 123 MG/DL (ref 70–110)
POCT GLUCOSE: 157 MG/DL (ref 70–110)
POCT GLUCOSE: 226 MG/DL (ref 70–110)
POTASSIUM SERPL-SCNC: 6 MMOL/L (ref 3.5–5.1)
PROT SERPL-MCNC: 6.3 G/DL (ref 6–8.4)
RBC # BLD AUTO: 3.52 M/UL (ref 4–5.4)
SODIUM SERPL-SCNC: 145 MMOL/L (ref 136–145)
WBC # BLD AUTO: 6.64 K/UL (ref 3.9–12.7)

## 2021-12-28 PROCEDURE — 99900031 HC PATIENT EDUCATION (STAT)

## 2021-12-28 PROCEDURE — 94660 CPAP INITIATION&MGMT: CPT

## 2021-12-28 PROCEDURE — 94761 N-INVAS EAR/PLS OXIMETRY MLT: CPT

## 2021-12-28 PROCEDURE — 83735 ASSAY OF MAGNESIUM: CPT | Performed by: NURSE PRACTITIONER

## 2021-12-28 PROCEDURE — 80053 COMPREHEN METABOLIC PANEL: CPT | Performed by: NURSE PRACTITIONER

## 2021-12-28 PROCEDURE — 97165 OT EVAL LOW COMPLEX 30 MIN: CPT

## 2021-12-28 PROCEDURE — 94640 AIRWAY INHALATION TREATMENT: CPT

## 2021-12-28 PROCEDURE — 63600175 PHARM REV CODE 636 W HCPCS: Performed by: INTERNAL MEDICINE

## 2021-12-28 PROCEDURE — 25000003 PHARM REV CODE 250: Performed by: NURSE PRACTITIONER

## 2021-12-28 PROCEDURE — 27000221 HC OXYGEN, UP TO 24 HOURS

## 2021-12-28 PROCEDURE — 25000242 PHARM REV CODE 250 ALT 637 W/ HCPCS: Performed by: INTERNAL MEDICINE

## 2021-12-28 PROCEDURE — 36415 COLL VENOUS BLD VENIPUNCTURE: CPT | Performed by: NURSE PRACTITIONER

## 2021-12-28 PROCEDURE — 99900035 HC TECH TIME PER 15 MIN (STAT)

## 2021-12-28 PROCEDURE — 63600175 PHARM REV CODE 636 W HCPCS: Performed by: STUDENT IN AN ORGANIZED HEALTH CARE EDUCATION/TRAINING PROGRAM

## 2021-12-28 PROCEDURE — 85025 COMPLETE CBC W/AUTO DIFF WBC: CPT | Performed by: NURSE PRACTITIONER

## 2021-12-28 PROCEDURE — 25000003 PHARM REV CODE 250: Performed by: INTERNAL MEDICINE

## 2021-12-28 PROCEDURE — 11000001 HC ACUTE MED/SURG PRIVATE ROOM

## 2021-12-28 RX ORDER — SODIUM CHLORIDE 9 MG/ML
INJECTION, SOLUTION INTRAVENOUS CONTINUOUS
Status: DISCONTINUED | OUTPATIENT
Start: 2021-12-28 | End: 2021-12-29

## 2021-12-28 RX ADMIN — SODIUM ZIRCONIUM CYCLOSILICATE 5 G: 5 POWDER, FOR SUSPENSION ORAL at 09:12

## 2021-12-28 RX ADMIN — INSULIN HUMAN 6 UNITS: 100 INJECTION, SOLUTION PARENTERAL at 12:12

## 2021-12-28 RX ADMIN — PRAMIPEXOLE DIHYDROCHLORIDE 1 MG: 0.5 TABLET ORAL at 09:12

## 2021-12-28 RX ADMIN — CEFTRIAXONE 1 G: 1 INJECTION, SOLUTION INTRAVENOUS at 12:12

## 2021-12-28 RX ADMIN — PRAMIPEXOLE DIHYDROCHLORIDE 1 MG: 0.5 TABLET ORAL at 08:12

## 2021-12-28 RX ADMIN — PANTOPRAZOLE SODIUM 40 MG: 40 TABLET, DELAYED RELEASE ORAL at 08:12

## 2021-12-28 RX ADMIN — ALBUTEROL SULFATE 2.5 MG: 2.5 SOLUTION RESPIRATORY (INHALATION) at 07:12

## 2021-12-28 RX ADMIN — SODIUM ZIRCONIUM CYCLOSILICATE 5 G: 5 POWDER, FOR SUSPENSION ORAL at 08:12

## 2021-12-28 RX ADMIN — ACETAMINOPHEN 650 MG: 325 TABLET ORAL at 09:12

## 2021-12-28 RX ADMIN — FUROSEMIDE 40 MG: 40 TABLET ORAL at 08:12

## 2021-12-28 RX ADMIN — ATORVASTATIN CALCIUM 10 MG: 10 TABLET, FILM COATED ORAL at 08:12

## 2021-12-28 RX ADMIN — ASPIRIN 81 MG: 81 TABLET, COATED ORAL at 08:12

## 2021-12-28 RX ADMIN — DONEPEZIL HYDROCHLORIDE 10 MG: 5 TABLET, FILM COATED ORAL at 09:12

## 2021-12-28 RX ADMIN — MIRTAZAPINE 7.5 MG: 7.5 TABLET ORAL at 09:12

## 2021-12-28 RX ADMIN — ENOXAPARIN SODIUM 40 MG: 40 INJECTION SUBCUTANEOUS at 04:12

## 2021-12-28 RX ADMIN — LEVOTHYROXINE SODIUM 125 MCG: 125 TABLET ORAL at 06:12

## 2021-12-28 RX ADMIN — SERTRALINE HYDROCHLORIDE 50 MG: 50 TABLET ORAL at 09:12

## 2021-12-28 RX ADMIN — SODIUM ZIRCONIUM CYCLOSILICATE 5 G: 5 POWDER, FOR SUSPENSION ORAL at 02:12

## 2021-12-28 RX ADMIN — SODIUM CHLORIDE: 0.9 INJECTION, SOLUTION INTRAVENOUS at 10:12

## 2021-12-28 RX ADMIN — ACETAMINOPHEN 650 MG: 325 TABLET ORAL at 08:12

## 2021-12-28 RX ADMIN — ALBUTEROL SULFATE 2.5 MG: 2.5 SOLUTION RESPIRATORY (INHALATION) at 01:12

## 2021-12-28 RX ADMIN — FLUTICASONE PROPIONATE 50 MCG: 50 SPRAY, METERED NASAL at 09:12

## 2021-12-28 NOTE — ASSESSMENT & PLAN NOTE
IV diuresis, will watch her urine output closely    12/27/21 [facility] get chest xray today, stop spironolactone and start lasix due to elevated potassium, continue oxygen get abg and monitor    12/28/21 [facility] see chest xray above, continue lasix and bipap intermittent and q hs

## 2021-12-28 NOTE — ASSESSMENT & PLAN NOTE
IV antibiotics  Blood cultures pending    12/27/21 [facility] get blood cultures, continue rocephin and await final urine culture    12/28/21 [facility] prelim urine shows no growth await final continue rocephin

## 2021-12-28 NOTE — ASSESSMENT & PLAN NOTE
12/27/21 [facility]: patient is lethargic this am and encephalopathy is likely multifactorial will continue to treat for UTI with Rocephin and await final urine culture, get ABG today, medications adjusted and lyrica was held due to new medication. Continue to monitor.     12/28/21 [facility] slight improvement continue treatment and monitor

## 2021-12-28 NOTE — PROGRESS NOTES
[facility] Medicine  Progress Note    Patient Name: Laisha Acosta  MRN: [id number]  Patient Class: IP- Inpatient   Admission Date: 12/24/2021  Length of Stay: 4 days  Attending Physician: Jo Underwood MD  Primary Care Provider: Jo Underwood MD        Subjective:     Principal Problem:Encephalopathy        HPI:  86-year-old female history of dementia, diabetes, hypertension, obesity brought in by EMS for increased confusion and shortness of breath for the past few days.  Daughter said that patient has been having increased cough with wheezing.  Has had her COVID vaccine.  History otherwise limited due to medical condition      Overview/Hospital Course:  12/24 Given antibiotics, admit to floor    12/27/21 [facility] patient is lethargic, complaining of butt pain. Patient mental status and not at baseline. Medication review completed and medications adjusted. Will get daily labs and chest xray this am and monitor.     12/28/21 [facility] patient is more awake and alert this am, still lethargic and falls asleep easily but improvement from yesterday. Daughter is at bedside updated on patients condition. She reports that patient has intermittent abdominal pain will get xray today       Interval History: see hospital course    Review of Systems   Constitutional: Positive for activity change, appetite change and fatigue.   Respiratory: Positive for shortness of breath.    Cardiovascular: Negative for chest pain and leg swelling.   Gastrointestinal: Positive for abdominal pain.   Neurological: Positive for weakness.   Psychiatric/Behavioral: Positive for confusion.     Objective:     Vital Signs (Most Recent):  Temp: 97.9 °F (36.6 °C) (12/28/21 0844)  Pulse: 78 (12/28/21 0844)  Resp: 20 (12/28/21 0844)  BP: 131/60 (12/28/21 0844)  SpO2: 96 % (12/28/21 0844) Vital Signs (24h Range):  Temp:  [97.9 °F (36.6 °C)-98.7 °F (37.1 °C)] 97.9 °F (36.6 °C)  Pulse:  [65-85] 78  Resp:  [16-20] 20  SpO2:  [95 %-100 %] 96  %  BP: (125-157)/(56-67) 131/60     Weight: 112.3 kg (247 lb 8 oz)  Body mass index is 43.84 kg/m².    Intake/Output Summary (Last 24 hours) at 12/28/2021 0852  Last data filed at 12/28/2021 0600  Gross per 24 hour                       Physical Exam  Constitutional:       Appearance: She is obese. She is ill-appearing.   HENT:      Head: Normocephalic.      Mouth/Throat:      Mouth: Mucous membranes are moist.   Cardiovascular:      Rate and Rhythm: Normal rate and regular rhythm.   Pulmonary:      Effort: Pulmonary effort is normal.      Comments: Decreased to bases   Abdominal:      General: Bowel sounds are normal. There is distension.      Palpations: Abdomen is soft.   Musculoskeletal:         General: No swelling.      Right lower leg: Edema (mild) present.      Left lower leg: Edema (mild) present.   Neurological:      Mental Status: She is lethargic and disoriented.      Motor: Weakness present.         Significant Labs:   All pertinent labs within the past 24 hours have been reviewed.  CBC:   Recent Labs   Lab 12/27/21  0954 12/28/21  0518   WBC 10.82 6.64   HGB 11.6* 10.5*   HCT 39.0 35.9*   * 126*     CMP:   Recent Labs   Lab 12/27/21  0954 12/27/21  0954 12/27/21  1355 12/27/21  1904 12/28/21  0518      < > 142 144 145   K 6.4*   < > 6.6* 6.5* 6.0*   *   < > 113* 113* 113*   CO2 28   < > 30* 28 26      < > 117* 120* 119*   BUN 57*   < > 57* 58* 59*   CREATININE 1.4   < > 1.4 1.4 1.3   CALCIUM 8.7   < > 8.9 9.0 9.0   PROT 6.8  --   --   --  6.3   ALBUMIN 2.9*  --   --   --  2.7*   BILITOT 0.5  --   --   --  0.5   ALKPHOS 109  --   --   --  101   AST 12  --   --   --  10   ALT 19  --   --   --  16   ANIONGAP 3*   < > -1* 3* 6*   EGFRNONAA 34.5*   < > 34.5* 34.5* 37.8*    < > = values in this interval not displayed.     Lactic Acid: No results for input(s): LACTATE in the last 48 hours.  Magnesium:   Recent Labs   Lab 12/27/21  0954 12/28/21  0518   MG 2.2 2.2     POCT Glucose:    Recent Labs   Lab 12/27/21  1703 12/27/21  1941 12/28/21  0440   POCTGLUCOSE 120* 122* 119*     Blood culture [[id number]] Collected: 12/27/21 0955   Order Status: Completed Specimen: Blood Updated: 12/28/21 0145    Blood Culture, Routine No Growth to date   Blood culture [[id number]] Collected: 12/27/21 0947   Order Status: Completed Specimen: Blood Updated: 12/28/21 0145    Blood Culture, Routine No Growth to date   Urine culture [[id number]] Collected: 12/24/21 1043   Order Status: Completed Specimen: Urine Updated: 12/25/21 2343    Urine Culture, Routine No significant growth   Narrative:     Preferred Collection Type->Urine, Clean Catch   Specimen Source->Urine       Significant Imaging: Chest xray:   Bibasilar pleuroparenchymal opacities suspicious for effusions with adjacent atelectasis versus pneumonia.     Chest xray 12/27/21: Diffuse mild heterogeneous opacification of the right lung, may reflect edema or pneumonia.     Small right pleural effusion.         Assessment/Plan:      * Encephalopathy  12/27/21 [facility]: patient is lethargic this am and encephalopathy is likely multifactorial will continue to treat for UTI with Rocephin and await final urine culture, get ABG today, medications adjusted and lyrica was held due to new medication. Continue to monitor.     12/28/21 [facility] slight improvement continue treatment and monitor      Protein malnutrition  Add glucerna and encourage po intake      Abdominal pain  Get kub      Acute respiratory failure with hypoxia and hypercarbia  Patient with Hypercapnic and Hypoxic Respiratory failure which is Acute.  she is not on home oxygen. Supplemental oxygen was provided and noted- Oxygen Concentration (%):  [40] 40.   Signs/symptoms of respiratory failure include- tachypnea and increased work of breathing. Contributing diagnoses includes - Pleural effusion and Pneumonia Labs and images were reviewed. Patient Has recent ABG, which has been reviewed. Will treat underlying  causes and adjust management of respiratory failure as follows-     Continue monitoring oxygen, intermittent bipap and qhs     DVT prophylaxis  lovenox       Hyperkalemia  Stop spironolactone and start lasix and monitor labs daily    12/28/21 [facility] continue lokelma, add low dose ivf and continue to monitor.       Acute cystitis  IV antibiotics  Blood cultures pending    12/27/21 [facility] get blood cultures, continue rocephin and await final urine culture    12/28/21 [facility] prelim urine shows no growth await final continue rocephin      Pleural effusion    IV diuresis, will watch her urine output closely    12/27/21 [facility] get chest xray today, stop spironolactone and start lasix due to elevated potassium, continue oxygen get abg and monitor    12/28/21 [facility] see chest xray above, continue lasix and bipap intermittent and q hs      VTE Risk Mitigation (From admission, onward)         Ordered     enoxaparin injection 40 mg  Daily         12/24/21 1359     IP VTE HIGH RISK PATIENT  Once         12/24/21 1359     Place sequential compression device  Until discontinued         12/24/21 1359                Discharge Planning   KIANA:      Code Status: Full Code   Is the patient medically ready for discharge?:     Reason for patient still in hospital (select all that apply): Patient trending condition, Laboratory test, Treatment and PT / OT recommendations  Discharge Plan A: Return to nursing home                  Bibi Strange NP  Department of Hospital Medicine   [facility]

## 2021-12-28 NOTE — ASSESSMENT & PLAN NOTE
Stop spironolactone and start lasix and monitor labs daily    12/28/21 [facility] continue lokelma, add low dose ivf and continue to monitor.

## 2021-12-28 NOTE — ASSESSMENT & PLAN NOTE
Patient with Hypercapnic and Hypoxic Respiratory failure which is Acute.  she is not on home oxygen. Supplemental oxygen was provided and noted- Oxygen Concentration (%):  [40] 40.   Signs/symptoms of respiratory failure include- tachypnea and increased work of breathing. Contributing diagnoses includes - Pleural effusion and Pneumonia Labs and images were reviewed. Patient Has recent ABG, which has been reviewed. Will treat underlying causes and adjust management of respiratory failure as follows-     Continue monitoring oxygen, intermittent bipap and qhs

## 2021-12-28 NOTE — SUBJECTIVE & OBJECTIVE
Interval History: see hospital course    Review of Systems   Constitutional: Positive for activity change, appetite change and fatigue.   Respiratory: Positive for shortness of breath.    Cardiovascular: Negative for chest pain and leg swelling.   Gastrointestinal: Positive for abdominal pain.   Neurological: Positive for weakness.   Psychiatric/Behavioral: Positive for confusion.     Objective:     Vital Signs (Most Recent):  Temp: 97.9 °F (36.6 °C) (12/28/21 0844)  Pulse: 78 (12/28/21 0844)  Resp: 20 (12/28/21 0844)  BP: 131/60 (12/28/21 0844)  SpO2: 96 % (12/28/21 0844) Vital Signs (24h Range):  Temp:  [97.9 °F (36.6 °C)-98.7 °F (37.1 °C)] 97.9 °F (36.6 °C)  Pulse:  [65-85] 78  Resp:  [16-20] 20  SpO2:  [95 %-100 %] 96 %  BP: (125-157)/(56-67) 131/60     Weight: 112.3 kg (247 lb 8 oz)  Body mass index is 43.84 kg/m².    Intake/Output Summary (Last 24 hours) at 12/28/2021 0852  Last data filed at 12/28/2021 0600  Gross per 24 hour                       Physical Exam  Constitutional:       Appearance: She is obese. She is ill-appearing.   HENT:      Head: Normocephalic.      Mouth/Throat:      Mouth: Mucous membranes are moist.   Cardiovascular:      Rate and Rhythm: Normal rate and regular rhythm.   Pulmonary:      Effort: Pulmonary effort is normal.      Comments: Decreased to bases   Abdominal:      General: Bowel sounds are normal. There is distension.      Palpations: Abdomen is soft.   Musculoskeletal:         General: No swelling.      Right lower leg: Edema (mild) present.      Left lower leg: Edema (mild) present.   Neurological:      Mental Status: She is lethargic and disoriented.      Motor: Weakness present.         Significant Labs:   All pertinent labs within the past 24 hours have been reviewed.  CBC:   Recent Labs   Lab 12/27/21  0954 12/28/21  0518   WBC 10.82 6.64   HGB 11.6* 10.5*   HCT 39.0 35.9*   * 126*     CMP:   Recent Labs   Lab 12/27/21  0954 12/27/21  0954 12/27/21  8101  12/27/21  1904 12/28/21  0518      < > 142 144 145   K 6.4*   < > 6.6* 6.5* 6.0*   *   < > 113* 113* 113*   CO2 28   < > 30* 28 26      < > 117* 120* 119*   BUN 57*   < > 57* 58* 59*   CREATININE 1.4   < > 1.4 1.4 1.3   CALCIUM 8.7   < > 8.9 9.0 9.0   PROT 6.8  --   --   --  6.3   ALBUMIN 2.9*  --   --   --  2.7*   BILITOT 0.5  --   --   --  0.5   ALKPHOS 109  --   --   --  101   AST 12  --   --   --  10   ALT 19  --   --   --  16   ANIONGAP 3*   < > -1* 3* 6*   EGFRNONAA 34.5*   < > 34.5* 34.5* 37.8*    < > = values in this interval not displayed.     Lactic Acid: No results for input(s): LACTATE in the last 48 hours.  Magnesium:   Recent Labs   Lab 12/27/21  0954 12/28/21  0518   MG 2.2 2.2     POCT Glucose:   Recent Labs   Lab 12/27/21  1703 12/27/21  1941 12/28/21  0440   POCTGLUCOSE 120* 122* 119*     Blood culture [[id number]] Collected: 12/27/21 0955   Order Status: Completed Specimen: Blood Updated: 12/28/21 0145    Blood Culture, Routine No Growth to date   Blood culture [[id number]] Collected: 12/27/21 0947   Order Status: Completed Specimen: Blood Updated: 12/28/21 0145    Blood Culture, Routine No Growth to date   Urine culture [[id number]] Collected: 12/24/21 1043   Order Status: Completed Specimen: Urine Updated: 12/25/21 2343    Urine Culture, Routine No significant growth   Narrative:     Preferred Collection Type->Urine, Clean Catch   Specimen Source->Urine       Significant Imaging: Chest xray:   Bibasilar pleuroparenchymal opacities suspicious for effusions with adjacent atelectasis versus pneumonia.     Chest xray 12/27/21: Diffuse mild heterogeneous opacification of the right lung, may reflect edema or pneumonia.     Small right pleural effusion.

## 2021-12-28 NOTE — PT/OT/SLP EVAL
Occupational Therapy   Evaluation    Name: Laisha Acosta  MRN: [id number]  Admitting Diagnosis:  Encephalopathy  Recent Surgery: * No surgery found *      Recommendations:     Discharge Recommendations: nursing facility, skilled  Discharge Equipment Recommendations:  other (see comments),bath bench,bedside commode,grab bar,hospital bed,oxygen,walker, rolling ( will provide)  Barriers to discharge:  Other (Comment) (Medical and functional status)    Assessment:     Laisha Acosta is a 86 y.o. female with a medical diagnosis of Encephalopathy.  She presents with functional deficits impacting independence with ADL's including functional mobility. Performance deficits affecting function: weakness,impaired endurance,impaired self care skills,impaired functional mobilty,impaired balance,visual deficits,impaired cognition,decreased coordination,decreased upper extremity function,decreased lower extremity function,decreased safety awareness,pain,decreased ROM,impaired fine motor,edema,impaired cardiopulmonary response to activity.      Rehab Prognosis: Good; patient would benefit from acute skilled OT services to address these deficits and reach maximum level of function.       Plan:     Patient to be seen 6 x/week to address the above listed problems via self-care/home management,therapeutic activities,therapeutic exercises,cognitive retraining  · Plan of Care Expires: 01/07/22  · Plan of Care Reviewed with: patient    Subjective     Chief Complaint: pain  Patient/Family Comments/goals: Pt did not verbalize on this date.    Occupational Profile:  Living Environment: Pt is a nursing home resident.  Previous level of function: Significant assistance  Roles and Routines: BADL's  Equipment Used at Home:  wheelchair  Assistance upon Discharge: NH staff will provide.    Pain/Comfort:  · Pain Rating 1: 8/10 (with activity)  · Location - Side 1: Right  · Location 1: leg  · Pain Addressed 1: Reposition,Cessation of Activity,Nurse  notified  · Pain Rating Post-Intervention 1: 0/10    Patients cultural, spiritual, Restoration conflicts given the current situation: no    Objective:     Communicated with: nurse prior to session.  Patient found supine with PureWick,peripheral IV,oxygen upon OT entry to room.    General Precautions: Standard, fall,hearing impaired   Orthopedic Precautions:N/A   Braces: N/A  Respiratory Status: Nasal cannula, flow 2 L/min    Occupational Performance:    Bed Mobility:    · Patient completed Rolling/Turning to Left with  total assistance  · Patient completed Rolling/Turning to Right with total assistance  · Patient completed Scooting/Bridging with total assistance  · Patient completed Supine to Sit with total assistance  · Patient completed Sit to Supine with total assistance    Functional Mobility/Transfers:  · Patient completed Sit <> Stand Transfer with total assistance  with  no assistive device   · Patient completed Bed <> Chair Transfer using Stand Pivot technique with total assistance with no assistive device  · Functional Mobility: Pt unable to ambulate.    Activities of Daily Living:  · Feeding:  maximal assistance    · Grooming: maximal assistance    · Bathing: maximal assistance    · Upper Body Dressing: maximal assistance    · Lower Body Dressing: total assistance    · Toileting: total assistance      Cognitive/Visual Perceptual:  Cognitive/Psychosocial Skills:  -       Oriented to: Person   -       Follows Commands/attention:Inattentive and Follows one-step commands  -       Communication: anomia  -       Memory: Impaired STM  -       Safety awareness/insight to disability: impaired   -       Mood/Affect/Coping skills/emotional control: Appropriate to situation and Lethargic  Visual/Perceptual:  -Impaired  acuity      Physical Exam:  Postural examination/scapula alignment: -       Rounded shoulders  -       Forward head  Edema:  Moderate bilateral UE and LE's  Dominant hand: -       Right  Upper Extremity  Range of Motion:  -       Right Upper Extremity: Deficits: 68 degrees shoulder flexion and 55 degrees abduction  -       Left Upper Extremity: Deficits: 101 degrees shoulder flexion and 96 degrees abduction  Upper Extremity Strength: -       Right Upper Extremity: Deficits: 2+ to 3- / 5  -       Left Upper Extremity: Deficits: 3- to 3 / 5   Strength: -       Right Upper Extremity: Deficits: 3 / 5  -       Left Upper Extremity: 3 / 5  Fine Motor Coordination: -       Impaired  Left hand, finger to nose  , Right hand, finger to nose  , Left hand, manipulation of objects   and Right hand, manipulation of objects    Gross motor coordination: Impaired bilateral UE hand-eye coordination    AMPAC 6 Click ADL:  AMPAC Total Score: 10    Treatment & Education:  Pt was provided education / instruction regarding role of OT and established OT POC.  Education:    Patient left supine with all lines intact, call button in reach and nurse notified    GOALS:   Goals to be met by: 01/07/21     Patient will increase functional independence with ADLs by performing:    Feeding with Set-up Assistance.  UE Dressing with Moderate Assistance.  LE Dressing with Maximum Assistance.  Grooming while seated at sink with Minimal Assistance.  Toileting from bedside commode with Maximum Assistance for hygiene and clothing management.   Bathing from  edge of bed with Moderate Assistance.  Step transfer with Maximum Assistance  Toilet transfer to bedside commode with Maximum Assistance.  Increased functional strength to 3+/5 for bilateral UE's.      History:     Past Medical History:   Diagnosis Date    Encephalopathy 12/24/2021       No past surgical history on file.    Time Tracking:     OT Date of Treatment: 12/28/21  OT Start Time: 0930  OT Stop Time: 1015  OT Total Time (min): 45 min    Billable Minutes:Evaluation 45    12/28/2021

## 2021-12-29 LAB
ALBUMIN SERPL BCP-MCNC: 2.7 G/DL (ref 3.5–5.2)
ALP SERPL-CCNC: 90 U/L (ref 55–135)
ALT SERPL W/O P-5'-P-CCNC: 17 U/L (ref 10–44)
ANION GAP SERPL CALC-SCNC: 5 MMOL/L (ref 8–16)
AST SERPL-CCNC: 9 U/L (ref 10–40)
BASOPHILS # BLD AUTO: 0.03 K/UL (ref 0–0.2)
BASOPHILS NFR BLD: 0.5 % (ref 0–1.9)
BILIRUB SERPL-MCNC: 0.5 MG/DL (ref 0.1–1)
BUN SERPL-MCNC: 55 MG/DL (ref 8–23)
CALCIUM SERPL-MCNC: 9.2 MG/DL (ref 8.7–10.5)
CHLORIDE SERPL-SCNC: 116 MMOL/L (ref 95–110)
CO2 SERPL-SCNC: 27 MMOL/L (ref 23–29)
CORRECTED TEMPERATURE (PCO2): 52.7 MMHG
CORRECTED TEMPERATURE (PH): 7.33
CORRECTED TEMPERATURE (PO2): 98 MMHG
CREAT SERPL-MCNC: 1.3 MG/DL (ref 0.5–1.4)
DIFFERENTIAL METHOD: ABNORMAL
EOSINOPHIL # BLD AUTO: 0 K/UL (ref 0–0.5)
EOSINOPHIL NFR BLD: 0.5 % (ref 0–8)
ERYTHROCYTE [DISTWIDTH] IN BLOOD BY AUTOMATED COUNT: 13.1 % (ref 11.5–14.5)
EST. GFR  (AFRICAN AMERICAN): 43.5 ML/MIN/1.73 M^2
EST. GFR  (NON AFRICAN AMERICAN): 37.8 ML/MIN/1.73 M^2
FIO2: 28 %
GLUCOSE SERPL-MCNC: 180 MG/DL (ref 70–110)
HCO3 UR-SCNC: 25.7 MMOL/L
HCT VFR BLD AUTO: 33.6 % (ref 37–48.5)
HGB BLD-MCNC: 10.1 G/DL (ref 12–16)
IMM GRANULOCYTES # BLD AUTO: 0.08 K/UL (ref 0–0.04)
IMM GRANULOCYTES NFR BLD AUTO: 1.4 % (ref 0–0.5)
LPM: 2
LYMPHOCYTES # BLD AUTO: 0.9 K/UL (ref 1–4.8)
LYMPHOCYTES NFR BLD: 16.1 % (ref 18–48)
MAGNESIUM SERPL-MCNC: 2.2 MG/DL (ref 1.6–2.6)
MCH RBC QN AUTO: 30.2 PG (ref 27–31)
MCHC RBC AUTO-ENTMCNC: 30.1 G/DL (ref 32–36)
MCV RBC AUTO: 101 FL (ref 82–98)
MODIFIED ALLEN'S TEST: ABNORMAL
MONOCYTES # BLD AUTO: 0.7 K/UL (ref 0.3–1)
MONOCYTES NFR BLD: 12.8 % (ref 4–15)
NEUTROPHILS # BLD AUTO: 3.9 K/UL (ref 1.8–7.7)
NEUTROPHILS NFR BLD: 68.7 % (ref 38–73)
NRBC BLD-RTO: 0 /100 WBC
O2DEVICE: ABNORMAL
PCO2 BLDA: 52.7 MMHG (ref 35–45)
PH SMN: 7.33 [PH] (ref 7.34–7.45)
PLATELET # BLD AUTO: 128 K/UL (ref 150–450)
PMV BLD AUTO: 12 FL (ref 9.2–12.9)
PO2 BLDA: 98 MMHG (ref 80–100)
POC BASE DEFICIT: 2.2 MMOL/L
POC PERFORMED BY: ABNORMAL
POC SATURATED O2: 96.2 %
POC TCO2: 26.5 MMOL/L
POC TEMPERATURE: 37 C
POCT GLUCOSE: 171 MG/DL (ref 70–110)
POCT GLUCOSE: 231 MG/DL (ref 70–110)
POCT GLUCOSE: 253 MG/DL (ref 70–110)
POTASSIUM SERPL-SCNC: 5.9 MMOL/L (ref 3.5–5.1)
PROT SERPL-MCNC: 6.3 G/DL (ref 6–8.4)
RBC # BLD AUTO: 3.34 M/UL (ref 4–5.4)
SODIUM SERPL-SCNC: 148 MMOL/L (ref 136–145)
SPECIMEN SOURCE: ABNORMAL
WBC # BLD AUTO: 5.7 K/UL (ref 3.9–12.7)

## 2021-12-29 PROCEDURE — 25000003 PHARM REV CODE 250: Performed by: INTERNAL MEDICINE

## 2021-12-29 PROCEDURE — 99900031 HC PATIENT EDUCATION (STAT)

## 2021-12-29 PROCEDURE — 80053 COMPREHEN METABOLIC PANEL: CPT | Performed by: NURSE PRACTITIONER

## 2021-12-29 PROCEDURE — C9399 UNCLASSIFIED DRUGS OR BIOLOG: HCPCS | Performed by: INTERNAL MEDICINE

## 2021-12-29 PROCEDURE — 63600175 PHARM REV CODE 636 W HCPCS: Performed by: INTERNAL MEDICINE

## 2021-12-29 PROCEDURE — 36600 WITHDRAWAL OF ARTERIAL BLOOD: CPT

## 2021-12-29 PROCEDURE — 99900035 HC TECH TIME PER 15 MIN (STAT)

## 2021-12-29 PROCEDURE — 97530 THERAPEUTIC ACTIVITIES: CPT

## 2021-12-29 PROCEDURE — 94660 CPAP INITIATION&MGMT: CPT

## 2021-12-29 PROCEDURE — 97163 PT EVAL HIGH COMPLEX 45 MIN: CPT

## 2021-12-29 PROCEDURE — 94761 N-INVAS EAR/PLS OXIMETRY MLT: CPT

## 2021-12-29 PROCEDURE — 25000242 PHARM REV CODE 250 ALT 637 W/ HCPCS: Performed by: INTERNAL MEDICINE

## 2021-12-29 PROCEDURE — 11000001 HC ACUTE MED/SURG PRIVATE ROOM

## 2021-12-29 PROCEDURE — 85025 COMPLETE CBC W/AUTO DIFF WBC: CPT | Performed by: NURSE PRACTITIONER

## 2021-12-29 PROCEDURE — 25000003 PHARM REV CODE 250: Performed by: NURSE PRACTITIONER

## 2021-12-29 PROCEDURE — 94640 AIRWAY INHALATION TREATMENT: CPT

## 2021-12-29 PROCEDURE — 82803 BLOOD GASES ANY COMBINATION: CPT

## 2021-12-29 PROCEDURE — 83735 ASSAY OF MAGNESIUM: CPT | Performed by: NURSE PRACTITIONER

## 2021-12-29 PROCEDURE — 63600175 PHARM REV CODE 636 W HCPCS: Performed by: STUDENT IN AN ORGANIZED HEALTH CARE EDUCATION/TRAINING PROGRAM

## 2021-12-29 PROCEDURE — 27000221 HC OXYGEN, UP TO 24 HOURS

## 2021-12-29 PROCEDURE — 36415 COLL VENOUS BLD VENIPUNCTURE: CPT | Performed by: NURSE PRACTITIONER

## 2021-12-29 RX ORDER — SODIUM CHLORIDE 450 MG/100ML
INJECTION, SOLUTION INTRAVENOUS CONTINUOUS
Status: DISCONTINUED | OUTPATIENT
Start: 2021-12-29 | End: 2022-01-03 | Stop reason: HOSPADM

## 2021-12-29 RX ADMIN — SODIUM ZIRCONIUM CYCLOSILICATE 5 G: 5 POWDER, FOR SUSPENSION ORAL at 02:12

## 2021-12-29 RX ADMIN — PANTOPRAZOLE SODIUM 40 MG: 40 TABLET, DELAYED RELEASE ORAL at 08:12

## 2021-12-29 RX ADMIN — ALBUTEROL SULFATE 2.5 MG: 2.5 SOLUTION RESPIRATORY (INHALATION) at 07:12

## 2021-12-29 RX ADMIN — INSULIN ASPART 6 UNITS: 100 INJECTION, SOLUTION INTRAVENOUS; SUBCUTANEOUS at 11:12

## 2021-12-29 RX ADMIN — FUROSEMIDE 40 MG: 40 TABLET ORAL at 08:12

## 2021-12-29 RX ADMIN — INSULIN ASPART 4 UNITS: 100 INJECTION, SOLUTION INTRAVENOUS; SUBCUTANEOUS at 04:12

## 2021-12-29 RX ADMIN — SODIUM ZIRCONIUM CYCLOSILICATE 5 G: 5 POWDER, FOR SUSPENSION ORAL at 08:12

## 2021-12-29 RX ADMIN — ENOXAPARIN SODIUM 40 MG: 40 INJECTION SUBCUTANEOUS at 04:12

## 2021-12-29 RX ADMIN — CEFTRIAXONE 1 G: 1 INJECTION, SOLUTION INTRAVENOUS at 11:12

## 2021-12-29 RX ADMIN — ACETAMINOPHEN 650 MG: 325 TABLET ORAL at 09:12

## 2021-12-29 RX ADMIN — SODIUM ZIRCONIUM CYCLOSILICATE 5 G: 5 POWDER, FOR SUSPENSION ORAL at 09:12

## 2021-12-29 RX ADMIN — DONEPEZIL HYDROCHLORIDE 10 MG: 5 TABLET, FILM COATED ORAL at 09:12

## 2021-12-29 RX ADMIN — PRAMIPEXOLE DIHYDROCHLORIDE 1 MG: 0.5 TABLET ORAL at 09:12

## 2021-12-29 RX ADMIN — ASPIRIN 81 MG: 81 TABLET, COATED ORAL at 08:12

## 2021-12-29 RX ADMIN — MIRTAZAPINE 7.5 MG: 7.5 TABLET ORAL at 09:12

## 2021-12-29 RX ADMIN — SODIUM CHLORIDE: 0.45 INJECTION, SOLUTION INTRAVENOUS at 08:12

## 2021-12-29 RX ADMIN — INSULIN DETEMIR 20 UNITS: 100 INJECTION, SOLUTION SUBCUTANEOUS at 09:12

## 2021-12-29 RX ADMIN — SERTRALINE HYDROCHLORIDE 50 MG: 50 TABLET ORAL at 09:12

## 2021-12-29 RX ADMIN — INSULIN ASPART 2 UNITS: 100 INJECTION, SOLUTION INTRAVENOUS; SUBCUTANEOUS at 09:12

## 2021-12-29 RX ADMIN — LEVOTHYROXINE SODIUM 125 MCG: 125 TABLET ORAL at 05:12

## 2021-12-29 RX ADMIN — ALBUTEROL SULFATE 2.5 MG: 2.5 SOLUTION RESPIRATORY (INHALATION) at 01:12

## 2021-12-29 RX ADMIN — ACETAMINOPHEN 650 MG: 325 TABLET ORAL at 08:12

## 2021-12-29 RX ADMIN — PRAMIPEXOLE DIHYDROCHLORIDE 1 MG: 0.5 TABLET ORAL at 08:12

## 2021-12-29 RX ADMIN — ATORVASTATIN CALCIUM 10 MG: 10 TABLET, FILM COATED ORAL at 08:12

## 2021-12-29 RX ADMIN — FLUTICASONE PROPIONATE 50 MCG: 50 SPRAY, METERED NASAL at 08:12

## 2021-12-29 NOTE — ASSESSMENT & PLAN NOTE
Stop spironolactone and start lasix and monitor labs daily    12/28/21 [facility] continue lokelma, add low dose ivf and continue to monitor.     12/29/21 [facility] labs improving continue meds and continue to monitor

## 2021-12-29 NOTE — ASSESSMENT & PLAN NOTE
IV antibiotics  Blood cultures pending    12/27/21 [facility] get blood cultures, continue rocephin and await final urine culture    12/28/21 [facility] prelim urine shows no growth await final continue rocephin    12/29/21 [facility] continue rocephin await final cultures

## 2021-12-29 NOTE — PT/OT/SLP PROGRESS
Occupational Therapy   Treatment    Name: Laisha Acosta  MRN: [id number]  Admitting Diagnosis:  Encephalopathy       Recommendations:     Discharge Recommendations: nursing facility, skilled  Discharge Equipment Recommendations:  other (see comments),bath bench,bedside commode,grab bar,hospital bed,oxygen,walker, rolling (NF will provide)  Barriers to discharge:  Other (Comment) (Medical and functional status)    Assessment:     Laisha Acosta is a 86 y.o. female with a medical diagnosis of Encephalopathy. Performance deficits affecting function are weakness,impaired endurance,impaired self care skills,impaired functional mobilty,impaired balance,visual deficits,impaired cognition,decreased coordination,decreased upper extremity function,decreased lower extremity function,decreased safety awareness,pain,decreased ROM,impaired fine motor,edema,impaired cardiopulmonary response to activity.     Rehab Prognosis:  Fair; patient would benefit from acute skilled OT services to address these deficits and reach maximum level of function.       Plan:     Patient to be seen 6 x/week to address the above listed problems via self-care/home management,therapeutic activities,therapeutic exercises,cognitive retraining  · Plan of Care Expires: 01/07/22  · Plan of Care Reviewed with: patient    Subjective     Pain/Comfort:  · Pain Rating 1: 6/10  · Location - Side 1: Right  · Location 1: leg  · Pain Addressed 1: Reposition,Cessation of Activity,Nurse notified  · Pain Rating Post-Intervention 1: 0/10    Objective:     Communicated with: nurse prior to session.  Patient found supine with oxygen,PureWick,peripheral IV upon OT entry to room.    General Precautions: Standard, fall,hearing impaired   Orthopedic Precautions:N/A   Braces:    Respiratory Status: Nasal cannula, flow 2 L/min     Occupational Performance:     Bed Mobility:    · Patient completed Rolling/Turning to Left with  total assistance  · Patient completed Rolling/Turning  to Right with total assistance  · Patient completed Scooting/Bridging with total assistance  · Patient completed Supine to Sit with total assistance  · Patient completed Sit to Supine with total assistance     Functional Mobility/Transfers:  · Patient completed Sit <> Stand Transfer with total assistance  with  no assistive device   · Functional Mobility: Pt unable to ambulate.    Treatment & Education:  Pt was cooperative and more motivated with minimal verbal encouragement while exhibiting positive affect. She performed bed mobility requiring total assist with continuous verbal and tactile cueing for technique utilizing bedrails. Pt then participated in therapeutic activity addressing trunk control, sitting balance, posture, and energy for task / endurance challenging her to sit upright EOB unsupported for approx 18 min while utilizing bilateral UE's to wash hair utilizing shower cap requiring steadying assist of trunk to sustain seated position when utilizing bilateral UE's. However, patient able to sustain upright seated position at EOB unsupported with use of bilateral UE's to stabilize self while losing her balance with UE movement to wash hair.      Patient left supine with all lines intact, call button in reach and nurse notifiedEducation:      GOALS:   Multidisciplinary Problems     Occupational Therapy Goals        Problem: Occupational Therapy Goal    Goal Priority Disciplines Outcome Interventions   Occupational Therapy Goal     OT, PT/OT     Description: Goals to be met by: 01/07/21     Patient will increase functional independence with ADLs by performing:    Feeding with Set-up Assistance.  UE Dressing with Moderate Assistance.  LE Dressing with Maximum Assistance.  Grooming while seated at sink with Minimal Assistance.  Toileting from bedside commode with Maximum Assistance for hygiene and clothing management.   Bathing from  edge of bed with Moderate Assistance.  Step transfer with Maximum  Assistance  Toilet transfer to bedside commode with Maximum Assistance.  Increased functional strength to 3+/5 for bilateral UE's.                     Time Tracking:     OT Date of Treatment: 12/29/21  OT Start Time: 1320  OT Stop Time: 1355  OT Total Time (min): 35 min    Billable Minutes:Therapeutic Activity 35    OT/FARHAD: OT          12/29/2021

## 2021-12-29 NOTE — RESPIRATORY THERAPY
12/29/21 0750   Patient Assessment/Suction   Level of Consciousness (AVPU) alert   Respiratory Effort Unlabored   Expansion/Accessory Muscles/Retractions no use of accessory muscles;no retractions   All Lung Fields Breath Sounds diminished   Rhythm/Pattern, Respiratory unlabored;pattern regular   Cough Frequency infrequent   Cough Type nonproductive   PRE-TX-O2   O2 Device (Oxygen Therapy) nasal cannula   $ Is the patient on Low Flow Oxygen? Yes   Flow (L/min) 2   Oxygen Concentration (%) 28   SpO2 98 %   Pulse Oximetry Type Intermittent   $ Pulse Oximetry - Multiple Charge Pulse Oximetry - Multiple   Pulse 77   Resp 14   Positioning HOB elevated 30 degrees   Aerosol Therapy   $ Aerosol Therapy Charges Aerosol Treatment   Daily Review of Necessity (SVN) completed   Respiratory Treatment Status (SVN) given   Treatment Route (SVN) mouthpiece   Patient Position (SVN) HOB elevated   Post Treatment Assessment (SVN) increased aeration   Signs of Intolerance (SVN) none   Breath Sounds Post-Respiratory Treatment   Throughout All Fields Post-Treatment All Fields   Throughout All Fields Post-Treatment aeration increased   Post-treatment Heart Rate (beats/min) 79   Post-treatment Resp Rate (breaths/min) 14   Respiratory Interventions   Cough And Deep Breathing done independently per patient   Ready to Wean/Extubation Screen   FIO2<=50 (chart decimal) 0.28   Education   $ Education 15 min   Respiratory Evaluation   $ Care Plan Tech Time 15 min   Patient requests to be taken off BIPAP at this time to eat breakfast. Placed on 2lpm nasal cannula.

## 2021-12-29 NOTE — PROGRESS NOTES
Physical Therapy Evaluation    Patient Name:  Laisha Acosta   MRN:  [id number]    Recommendations:     Discharge Recommendations:  nursing facility, skilled   Discharge Equipment Recommendations:     Barriers to discharge: decreased fxn'l mobility    Assessment:     Laisha Acosta is a 86 y.o. female admitted with a medical diagnosis of Encephalopathy.  She presents with the following impairments/functional limitations:  weakness,impaired endurance,impaired functional mobilty .    Rehab Prognosis: Poor; patient would benefit from acute skilled PT services to address these deficits and reach maximum level of function.    Recent Surgery: * No surgery found *      Plan:     During this hospitalization, patient to be seen 6 x/week to address the identified rehab impairments via therapeutic activities,therapeutic exercises and progress toward the following goals:    · Plan of Care Expires:       Subjective     Chief Complaint: right leg pain  Patient/Family Comments/goals:   Pain/Comfort:  · Pain Rating 1: 8/10  · Location - Side 1: Right  · Location 1: leg    Patients cultural, spiritual, Pentecostal conflicts given the current situation:      Living Environment:  Currently resides in NH  Prior to admission, patients level of function was dependent care.  Equipment used at home: wheelchair.  DME owned (not currently used):   Upon discharge, patient will have assistance from nh staff.    Objective:     Communicated with nurse prior to session.  Patient found supine with    upon PT entry to room.    General Precautions: Standard, fall   Orthopedic Precautions:    Braces:    Respiratory Status:     Exams:  · RLE ROM: AROM is not full but WFL  · RLE Strength: 3/5  · LLE ROM: no AROM and PROM is limited  · LLE Strength: 0/5    Functional Mobility:  · Bed Mobility:     · Rolling Left:  total assistance  · Rolling Right: total assistance  · Scooting: total assistance  · Supine to Sit: total assistance  · Sit to Supine: total  assistance  · Transfers:     · Sit to Stand:  pt unable to stand with    · Gait: pt cant walk    Therapeutic Activities and Exercises:       AM-PAC 6 CLICK MOBILITY  Total Score:8     Patient left supine with call button in reach.    GOALS:   STGs  1. Pt will be maxa with bed mobility  2. Pt will be maxa with transfers bed to a     History:     Past Medical History:   Diagnosis Date    Encephalopathy 12/24/2021       No past surgical history on file.    Time Tracking:     PT Received On:    PT Start Time: 1245     PT Stop Time: 1300  PT Total Time (min): 15 min     Billable Minutes: Evaluation 15      12/29/2021

## 2021-12-29 NOTE — SUBJECTIVE & OBJECTIVE
Interval History: see hospital course    Review of Systems   Constitutional: Positive for activity change, appetite change and fatigue.   Respiratory: Positive for shortness of breath.    Cardiovascular: Negative for chest pain and leg swelling.   Gastrointestinal: Positive for abdominal pain.   Neurological: Positive for weakness.   Psychiatric/Behavioral: Positive for confusion.     Objective:     Vital Signs (Most Recent):  Temp: 97.6 °F (36.4 °C) (12/29/21 0730)  Pulse: 77 (12/29/21 0750)  Resp: 14 (12/29/21 0750)  BP: (!) 145/60 (12/29/21 0730)  SpO2: 98 % (12/29/21 0750) Vital Signs (24h Range):  Temp:  [66.2 °F (19 °C)-98.8 °F (37.1 °C)] 97.6 °F (36.4 °C)  Pulse:  [72-95] 77  Resp:  [14-22] 14  SpO2:  [90 %-100 %] 98 %  BP: (125-145)/(60-67) 145/60     Weight: 112.3 kg (247 lb 8 oz)  Body mass index is 43.84 kg/m².    Intake/Output Summary (Last 24 hours) at 12/29/2021 0934  Last data filed at 12/29/2021 0600  Gross per 24 hour   Intake 580 ml   Output 2100 ml   Net -1520 ml      Physical Exam  Constitutional:       Appearance: She is obese. She is ill-appearing.   HENT:      Head: Normocephalic.      Mouth/Throat:      Mouth: Mucous membranes are moist.   Cardiovascular:      Rate and Rhythm: Normal rate and regular rhythm.   Pulmonary:      Effort: Pulmonary effort is normal.      Comments: Decreased to bases   Abdominal:      General: Bowel sounds are normal. There is distension.      Palpations: Abdomen is soft.   Musculoskeletal:         General: No swelling.      Right lower leg: Edema (mild) present.      Left lower leg: Edema (mild) present.   Neurological:      Mental Status: She is lethargic and disoriented.      Motor: Weakness present.         Significant Labs:   All pertinent labs within the past 24 hours have been reviewed.  CBC:   Recent Labs   Lab 12/27/21  0954 12/28/21  0518 12/29/21  0524   WBC 10.82 6.64 5.70   HGB 11.6* 10.5* 10.1*   HCT 39.0 35.9* 33.6*   * 126* 128*     CMP:    Recent Labs   Lab 12/27/21  0954 12/27/21  1355 12/27/21  1904 12/28/21  0518 12/29/21  0524      < > 144 145 148*   K 6.4*   < > 6.5* 6.0* 5.9*   *   < > 113* 113* 116*   CO2 28   < > 28 26 27      < > 120* 119* 180*   BUN 57*   < > 58* 59* 55*   CREATININE 1.4   < > 1.4 1.3 1.3   CALCIUM 8.7   < > 9.0 9.0 9.2   PROT 6.8  --   --  6.3 6.3   ALBUMIN 2.9*  --   --  2.7* 2.7*   BILITOT 0.5  --   --  0.5 0.5   ALKPHOS 109  --   --  101 90   AST 12  --   --  10 9*   ALT 19  --   --  16 17   ANIONGAP 3*   < > 3* 6* 5*   EGFRNONAA 34.5*   < > 34.5* 37.8* 37.8*    < > = values in this interval not displayed.     Lactic Acid: No results for input(s): LACTATE in the last 48 hours.  Magnesium:   Recent Labs   Lab 12/27/21  0954 12/28/21  0518 12/29/21  0524   MG 2.2 2.2 2.2     POCT Glucose:   Recent Labs   Lab 12/28/21  1115 12/28/21  1649 12/28/21  1926   POCTGLUCOSE 123* 157* 226*     Blood culture [[id number]] Collected: 12/27/21 0955   Order Status: Completed Specimen: Blood Updated: 12/28/21 2012    Blood Culture, Routine No Growth to date     No Growth to date   Blood culture [[id number]] Collected: 12/27/21 0947   Order Status: Completed Specimen: Blood Updated: 12/28/21 2012    Blood Culture, Routine No Growth to date     No Growth to date   Urine culture [[id number]] Collected: 12/24/21 1043   Order Status: Completed Specimen: Urine Updated: 12/25/21 2343    Urine Culture, Routine No significant growth   Narrative:     Preferred Collection Type->Urine, Clean Catch   Specimen Source->Urine           Significant Imaging: Chest xray:   Bibasilar pleuroparenchymal opacities suspicious for effusions with adjacent atelectasis versus pneumonia.     KUB: Nonobstructive bowel gas pattern.     Chest xray: Diffuse mild heterogeneous opacification of the right lung, may reflect edema or pneumonia.     Small right pleural effusion.

## 2021-12-29 NOTE — ASSESSMENT & PLAN NOTE
12/27/21 L[facility]: patient is lethargic this am and encephalopathy is likely multifactorial will continue to treat for UTI with Rocephin and await final urine culture, get ABG today, medications adjusted and lyrica was held due to new medication. Continue to monitor.     12/28/21 L[facility] slight improvement continue treatment and monitor    12/29/21 [facility] mental status close to baseline

## 2021-12-29 NOTE — PROGRESS NOTES
[facility] Medicine  Progress Note    Patient Name: Laisha Acosta  MRN: [id number]  Patient Class: IP- Inpatient   Admission Date: 12/24/2021  Length of Stay: 5 days  Attending Physician: Jo Underwood MD  Primary Care Provider: Jo Underwood MD        Subjective:     Principal Problem:Encephalopathy        HPI:  86-year-old female history of dementia, diabetes, hypertension, obesity brought in by EMS for increased confusion and shortness of breath for the past few days.  Daughter said that patient has been having increased cough with wheezing.  Has had her COVID vaccine.  History otherwise limited due to medical condition      Overview/Hospital Course:  12/24 Given antibiotics, admit to floor    12/27/21 [facility] patient is lethargic, complaining of butt pain. Patient mental status and not at baseline. Medication review completed and medications adjusted. Will get daily labs and chest xray this am and monitor.     12/28/21 [facility] patient is more awake and alert this am, still lethargic and falls asleep easily but improvement from yesterday. Daughter is at bedside updated on patients condition. She reports that patient has intermittent abdominal pain will get xray today     12/29/21 [facility] patient's mental status is close to baseline and denies any pain or discomfort at this time. Will continue treatment, await final cultures, continue PT      Interval History: see hospital course    Review of Systems   Constitutional: Positive for activity change, appetite change and fatigue.   Respiratory: Positive for shortness of breath.    Cardiovascular: Negative for chest pain and leg swelling.   Gastrointestinal: Positive for abdominal pain.   Neurological: Positive for weakness.   Psychiatric/Behavioral: Positive for confusion.     Objective:     Vital Signs (Most Recent):  Temp: 97.6 °F (36.4 °C) (12/29/21 0730)  Pulse: 77 (12/29/21 0750)  Resp: 14 (12/29/21 0750)  BP: (!) 145/60 (12/29/21 0730)  SpO2: 98  % (12/29/21 0750) Vital Signs (24h Range):  Temp:  [66.2 °F (19 °C)-98.8 °F (37.1 °C)] 97.6 °F (36.4 °C)  Pulse:  [72-95] 77  Resp:  [14-22] 14  SpO2:  [90 %-100 %] 98 %  BP: (125-145)/(60-67) 145/60     Weight: 112.3 kg (247 lb 8 oz)  Body mass index is 43.84 kg/m².    Intake/Output Summary (Last 24 hours) at 12/29/2021 0934  Last data filed at 12/29/2021 0600  Gross per 24 hour   Intake 580 ml   Output 2100 ml   Net -1520 ml      Physical Exam  Constitutional:       Appearance: She is obese. She is ill-appearing.   HENT:      Head: Normocephalic.      Mouth/Throat:      Mouth: Mucous membranes are moist.   Cardiovascular:      Rate and Rhythm: Normal rate and regular rhythm.   Pulmonary:      Effort: Pulmonary effort is normal.      Comments: Decreased to bases   Abdominal:      General: Bowel sounds are normal. There is distension.      Palpations: Abdomen is soft.   Musculoskeletal:         General: No swelling.      Right lower leg: Edema (mild) present.      Left lower leg: Edema (mild) present.   Neurological:      Mental Status: She is lethargic and disoriented.      Motor: Weakness present.         Significant Labs:   All pertinent labs within the past 24 hours have been reviewed.  CBC:   Recent Labs   Lab 12/27/21  0954 12/28/21  0518 12/29/21  0524   WBC 10.82 6.64 5.70   HGB 11.6* 10.5* 10.1*   HCT 39.0 35.9* 33.6*   * 126* 128*     CMP:   Recent Labs   Lab 12/27/21  0954 12/27/21  1355 12/27/21  1904 12/28/21  0518 12/29/21  0524      < > 144 145 148*   K 6.4*   < > 6.5* 6.0* 5.9*   *   < > 113* 113* 116*   CO2 28   < > 28 26 27      < > 120* 119* 180*   BUN 57*   < > 58* 59* 55*   CREATININE 1.4   < > 1.4 1.3 1.3   CALCIUM 8.7   < > 9.0 9.0 9.2   PROT 6.8  --   --  6.3 6.3   ALBUMIN 2.9*  --   --  2.7* 2.7*   BILITOT 0.5  --   --  0.5 0.5   ALKPHOS 109  --   --  101 90   AST 12  --   --  10 9*   ALT 19  --   --  16 17   ANIONGAP 3*   < > 3* 6* 5*   EGFRNONAA 34.5*   < > 34.5*  37.8* 37.8*    < > = values in this interval not displayed.     Lactic Acid: No results for input(s): LACTATE in the last 48 hours.  Magnesium:   Recent Labs   Lab 12/27/21  0954 12/28/21  0518 12/29/21  0524   MG 2.2 2.2 2.2     POCT Glucose:   Recent Labs   Lab 12/28/21  1115 12/28/21  1649 12/28/21  1926   POCTGLUCOSE 123* 157* 226*     Blood culture [[id number]] Collected: 12/27/21 0955   Order Status: Completed Specimen: Blood Updated: 12/28/21 2012    Blood Culture, Routine No Growth to date     No Growth to date   Blood culture [[id number]] Collected: 12/27/21 0947   Order Status: Completed Specimen: Blood Updated: 12/28/21 2012    Blood Culture, Routine No Growth to date     No Growth to date   Urine culture [[id number]] Collected: 12/24/21 1043   Order Status: Completed Specimen: Urine Updated: 12/25/21 2343    Urine Culture, Routine No significant growth   Narrative:     Preferred Collection Type->Urine, Clean Catch   Specimen Source->Urine           Significant Imaging: Chest xray:   Bibasilar pleuroparenchymal opacities suspicious for effusions with adjacent atelectasis versus pneumonia.     KUB: Nonobstructive bowel gas pattern.     Chest xray: Diffuse mild heterogeneous opacification of the right lung, may reflect edema or pneumonia.     Small right pleural effusion.         Assessment/Plan:      * Encephalopathy  12/27/21 [facility]: patient is lethargic this am and encephalopathy is likely multifactorial will continue to treat for UTI with Rocephin and await final urine culture, get ABG today, medications adjusted and lyrica was held due to new medication. Continue to monitor.     12/28/21 [facility] slight improvement continue treatment and monitor    12/29/21 [facility] mental status close to baseline      Protein malnutrition  Add glucerna and encourage po intake      Abdominal pain  Get kub    12/29/21 [facility] see kub above, abdominal pain resolved      Acute respiratory failure with hypoxia and hypercarbia  Patient with  Hypercapnic and Hypoxic Respiratory failure which is Acute.  she is not on home oxygen. Supplemental oxygen was provided and noted- Oxygen Concentration (%):  [28-40] 40.   Signs/symptoms of respiratory failure include- tachypnea and increased work of breathing. Contributing diagnoses includes - Pleural effusion and Pneumonia Labs and images were reviewed. Patient Has recent ABG, which has been reviewed. Will treat underlying causes and adjust management of respiratory failure as follows-     Continue monitoring oxygen, intermittent bipap and qhs     12/29/21 [facility] continue to wean oxygen as tolerates    DVT prophylaxis  lovenox       Hyperkalemia  Stop spironolactone and start lasix and monitor labs daily    12/28/21 [facility] continue lokelma, add low dose ivf and continue to monitor.     12/29/21 [facility] labs improving continue meds and continue to monitor      Acute cystitis  IV antibiotics  Blood cultures pending    12/27/21 [facility] get blood cultures, continue rocephin and await final urine culture    12/28/21 [facility] prelim urine shows no growth await final continue rocephin    12/29/21 [facility] continue rocephin await final cultures    Pleural effusion    IV diuresis, will watch her urine output closely    12/27/21 [facility] get chest xray today, stop spironolactone and start lasix due to elevated potassium, continue oxygen get abg and monitor    12/28/21 [facility] see chest xray above, continue lasix and bipap intermittent and q hs    12/29/21 [facility] improving with lasix, continue to monitor.       VTE Risk Mitigation (From admission, onward)         Ordered     enoxaparin injection 40 mg  Daily         12/24/21 1359     IP VTE HIGH RISK PATIENT  Once         12/24/21 1359     Place sequential compression device  Until discontinued         12/24/21 1359                Discharge Planning   KIANA:      Code Status: Full Code   Is the patient medically ready for discharge?:     Reason for patient still in hospital (select all that apply): Patient  trending condition, Laboratory test, Treatment and PT / OT recommendations  Discharge Plan A: Return to nursing home                  Bibi Strange NP  Department of Hospital Medicine   [facility]

## 2021-12-29 NOTE — ASSESSMENT & PLAN NOTE
Patient with Hypercapnic and Hypoxic Respiratory failure which is Acute.  she is not on home oxygen. Supplemental oxygen was provided and noted- Oxygen Concentration (%):  [28-40] 40.   Signs/symptoms of respiratory failure include- tachypnea and increased work of breathing. Contributing diagnoses includes - Pleural effusion and Pneumonia Labs and images were reviewed. Patient Has recent ABG, which has been reviewed. Will treat underlying causes and adjust management of respiratory failure as follows-     Continue monitoring oxygen, intermittent bipap and qhs     12/29/21 [facility] continue to wean oxygen as tolerates

## 2021-12-29 NOTE — ASSESSMENT & PLAN NOTE
IV diuresis, will watch her urine output closely    12/27/21 [facility] get chest xray today, stop spironolactone and start lasix due to elevated potassium, continue oxygen get abg and monitor    12/28/21 [facility] see chest xray above, continue lasix and bipap intermittent and q hs    12/29/21 [facility] improving with lasix, continue to monitor.

## 2021-12-29 NOTE — ASSESSMENT & PLAN NOTE
Add glucerna and encourage po intake     Scheduled pt for 11/21 for labs prior to appt with dr jernigan

## 2021-12-29 NOTE — PROGRESS NOTES
Pt was calling out loudly as I approached her room. When I got there she was cryng and wanting to leave , but not knowing where she was. I attempted to get her to participate but she was too upset to comply

## 2021-12-30 LAB
ALBUMIN SERPL BCP-MCNC: 2.7 G/DL (ref 3.5–5.2)
ALP SERPL-CCNC: 82 U/L (ref 55–135)
ALT SERPL W/O P-5'-P-CCNC: 19 U/L (ref 10–44)
ANION GAP SERPL CALC-SCNC: 5 MMOL/L (ref 8–16)
AST SERPL-CCNC: 12 U/L (ref 10–40)
BASOPHILS # BLD AUTO: 0.04 K/UL (ref 0–0.2)
BASOPHILS NFR BLD: 0.7 % (ref 0–1.9)
BILIRUB SERPL-MCNC: 0.5 MG/DL (ref 0.1–1)
BUN SERPL-MCNC: 37 MG/DL (ref 8–23)
CALCIUM SERPL-MCNC: 8.8 MG/DL (ref 8.7–10.5)
CHLORIDE SERPL-SCNC: 110 MMOL/L (ref 95–110)
CO2 SERPL-SCNC: 28 MMOL/L (ref 23–29)
CREAT SERPL-MCNC: 1 MG/DL (ref 0.5–1.4)
DIFFERENTIAL METHOD: ABNORMAL
EOSINOPHIL # BLD AUTO: 0.1 K/UL (ref 0–0.5)
EOSINOPHIL NFR BLD: 1.6 % (ref 0–8)
ERYTHROCYTE [DISTWIDTH] IN BLOOD BY AUTOMATED COUNT: 13.2 % (ref 11.5–14.5)
EST. GFR  (AFRICAN AMERICAN): 59.8 ML/MIN/1.73 M^2
EST. GFR  (NON AFRICAN AMERICAN): 51.9 ML/MIN/1.73 M^2
GLUCOSE SERPL-MCNC: 219 MG/DL (ref 70–110)
HCT VFR BLD AUTO: 34.4 % (ref 37–48.5)
HGB BLD-MCNC: 10.7 G/DL (ref 12–16)
IMM GRANULOCYTES # BLD AUTO: 0.1 K/UL (ref 0–0.04)
IMM GRANULOCYTES NFR BLD AUTO: 1.6 % (ref 0–0.5)
LYMPHOCYTES # BLD AUTO: 0.8 K/UL (ref 1–4.8)
LYMPHOCYTES NFR BLD: 13.8 % (ref 18–48)
MAGNESIUM SERPL-MCNC: 1.7 MG/DL (ref 1.6–2.6)
MCH RBC QN AUTO: 29.9 PG (ref 27–31)
MCHC RBC AUTO-ENTMCNC: 31.1 G/DL (ref 32–36)
MCV RBC AUTO: 96 FL (ref 82–98)
MONOCYTES # BLD AUTO: 0.6 K/UL (ref 0.3–1)
MONOCYTES NFR BLD: 10.4 % (ref 4–15)
NEUTROPHILS # BLD AUTO: 4.4 K/UL (ref 1.8–7.7)
NEUTROPHILS NFR BLD: 71.9 % (ref 38–73)
NRBC BLD-RTO: 0 /100 WBC
PLATELET # BLD AUTO: 129 K/UL (ref 150–450)
PMV BLD AUTO: 12.3 FL (ref 9.2–12.9)
POCT GLUCOSE: 214 MG/DL (ref 70–110)
POCT GLUCOSE: 215 MG/DL (ref 70–110)
POCT GLUCOSE: 222 MG/DL (ref 70–110)
POCT GLUCOSE: 252 MG/DL (ref 70–110)
POTASSIUM SERPL-SCNC: 4.4 MMOL/L (ref 3.5–5.1)
PROT SERPL-MCNC: 6.2 G/DL (ref 6–8.4)
RBC # BLD AUTO: 3.58 M/UL (ref 4–5.4)
SODIUM SERPL-SCNC: 143 MMOL/L (ref 136–145)
WBC # BLD AUTO: 6.08 K/UL (ref 3.9–12.7)

## 2021-12-30 PROCEDURE — 94660 CPAP INITIATION&MGMT: CPT

## 2021-12-30 PROCEDURE — 94761 N-INVAS EAR/PLS OXIMETRY MLT: CPT

## 2021-12-30 PROCEDURE — 80053 COMPREHEN METABOLIC PANEL: CPT | Performed by: NURSE PRACTITIONER

## 2021-12-30 PROCEDURE — 97530 THERAPEUTIC ACTIVITIES: CPT | Mod: CO

## 2021-12-30 PROCEDURE — 99900031 HC PATIENT EDUCATION (STAT)

## 2021-12-30 PROCEDURE — 83735 ASSAY OF MAGNESIUM: CPT | Performed by: NURSE PRACTITIONER

## 2021-12-30 PROCEDURE — 25000242 PHARM REV CODE 250 ALT 637 W/ HCPCS: Performed by: INTERNAL MEDICINE

## 2021-12-30 PROCEDURE — 97110 THERAPEUTIC EXERCISES: CPT

## 2021-12-30 PROCEDURE — 25000003 PHARM REV CODE 250: Performed by: NURSE PRACTITIONER

## 2021-12-30 PROCEDURE — 11000001 HC ACUTE MED/SURG PRIVATE ROOM

## 2021-12-30 PROCEDURE — 25000003 PHARM REV CODE 250: Performed by: INTERNAL MEDICINE

## 2021-12-30 PROCEDURE — 63600175 PHARM REV CODE 636 W HCPCS: Performed by: STUDENT IN AN ORGANIZED HEALTH CARE EDUCATION/TRAINING PROGRAM

## 2021-12-30 PROCEDURE — 94640 AIRWAY INHALATION TREATMENT: CPT

## 2021-12-30 PROCEDURE — 27000221 HC OXYGEN, UP TO 24 HOURS

## 2021-12-30 PROCEDURE — 99900035 HC TECH TIME PER 15 MIN (STAT)

## 2021-12-30 PROCEDURE — 85025 COMPLETE CBC W/AUTO DIFF WBC: CPT | Performed by: NURSE PRACTITIONER

## 2021-12-30 PROCEDURE — 36415 COLL VENOUS BLD VENIPUNCTURE: CPT | Performed by: NURSE PRACTITIONER

## 2021-12-30 RX ADMIN — ATORVASTATIN CALCIUM 10 MG: 10 TABLET, FILM COATED ORAL at 08:12

## 2021-12-30 RX ADMIN — SODIUM CHLORIDE: 0.45 INJECTION, SOLUTION INTRAVENOUS at 05:12

## 2021-12-30 RX ADMIN — SODIUM ZIRCONIUM CYCLOSILICATE 5 G: 5 POWDER, FOR SUSPENSION ORAL at 08:12

## 2021-12-30 RX ADMIN — ASPIRIN 81 MG: 81 TABLET, COATED ORAL at 08:12

## 2021-12-30 RX ADMIN — CEFTRIAXONE 1 G: 1 INJECTION, SOLUTION INTRAVENOUS at 11:12

## 2021-12-30 RX ADMIN — INSULIN ASPART 3 UNITS: 100 INJECTION, SOLUTION INTRAVENOUS; SUBCUTANEOUS at 08:12

## 2021-12-30 RX ADMIN — FENTANYL 1 PATCH: 50 PATCH, EXTENDED RELEASE TRANSDERMAL at 06:12

## 2021-12-30 RX ADMIN — ALBUTEROL SULFATE 2.5 MG: 2.5 SOLUTION RESPIRATORY (INHALATION) at 08:12

## 2021-12-30 RX ADMIN — INSULIN ASPART 4 UNITS: 100 INJECTION, SOLUTION INTRAVENOUS; SUBCUTANEOUS at 06:12

## 2021-12-30 RX ADMIN — INSULIN DETEMIR 20 UNITS: 100 INJECTION, SOLUTION SUBCUTANEOUS at 08:12

## 2021-12-30 RX ADMIN — ENOXAPARIN SODIUM 40 MG: 40 INJECTION SUBCUTANEOUS at 04:12

## 2021-12-30 RX ADMIN — ACETAMINOPHEN 650 MG: 325 TABLET ORAL at 08:12

## 2021-12-30 RX ADMIN — MIRTAZAPINE 7.5 MG: 7.5 TABLET ORAL at 08:12

## 2021-12-30 RX ADMIN — INSULIN ASPART 4 UNITS: 100 INJECTION, SOLUTION INTRAVENOUS; SUBCUTANEOUS at 04:12

## 2021-12-30 RX ADMIN — PANTOPRAZOLE SODIUM 40 MG: 40 TABLET, DELAYED RELEASE ORAL at 08:12

## 2021-12-30 RX ADMIN — INSULIN ASPART 4 UNITS: 100 INJECTION, SOLUTION INTRAVENOUS; SUBCUTANEOUS at 11:12

## 2021-12-30 RX ADMIN — SERTRALINE HYDROCHLORIDE 50 MG: 50 TABLET ORAL at 08:12

## 2021-12-30 RX ADMIN — DONEPEZIL HYDROCHLORIDE 10 MG: 5 TABLET, FILM COATED ORAL at 08:12

## 2021-12-30 RX ADMIN — FLUTICASONE PROPIONATE 50 MCG: 50 SPRAY, METERED NASAL at 08:12

## 2021-12-30 RX ADMIN — PRAMIPEXOLE DIHYDROCHLORIDE 1 MG: 0.5 TABLET ORAL at 08:12

## 2021-12-30 RX ADMIN — LEVOTHYROXINE SODIUM 125 MCG: 125 TABLET ORAL at 06:12

## 2021-12-30 RX ADMIN — FUROSEMIDE 40 MG: 40 TABLET ORAL at 08:12

## 2021-12-30 RX ADMIN — ALBUTEROL SULFATE 2.5 MG: 2.5 SOLUTION RESPIRATORY (INHALATION) at 01:12

## 2021-12-30 RX ADMIN — ALBUTEROL SULFATE 2.5 MG: 2.5 SOLUTION RESPIRATORY (INHALATION) at 07:12

## 2021-12-30 NOTE — PLAN OF CARE
Plan of care reviewed with patient. Educated, but unable to comprehend teachings. Will cont to monitor for ability to learn.

## 2021-12-30 NOTE — ASSESSMENT & PLAN NOTE
Stop spironolactone and start lasix and monitor labs daily    12/28/21 [facility] continue lokelma, add low dose ivf and continue to monitor.     12/29/21 [facility] labs improving continue meds and continue to monitor    12/30/21 [facility] resolved with medications will stop lokelma and monitor.

## 2021-12-30 NOTE — ASSESSMENT & PLAN NOTE
IV diuresis, will watch her urine output closely    12/27/21 [facility] get chest xray today, stop spironolactone and start lasix due to elevated potassium, continue oxygen get abg and monitor    12/28/21 [facility] see chest xray above, continue lasix and bipap intermittent and q hs    12/29/21 [facility] improving with lasix, continue to monitor.     12/30/21 [facility] stable

## 2021-12-30 NOTE — PROGRESS NOTES
[facility] Medicine  Progress Note    Patient Name: Laisha Acosta  MRN: [id number]  Patient Class: IP- Inpatient   Admission Date: 12/24/2021  Length of Stay: 6 days  Attending Physician: Jo Underwood MD  Primary Care Provider: Jo Underwood MD        Subjective:     Principal Problem:Encephalopathy        HPI:  86-year-old female history of dementia, diabetes, hypertension, obesity brought in by EMS for increased confusion and shortness of breath for the past few days.  Daughter said that patient has been having increased cough with wheezing.  Has had her COVID vaccine.  History otherwise limited due to medical condition      Overview/Hospital Course:  12/24 Given antibiotics, admit to floor    12/27/21 [facility] patient is lethargic, complaining of butt pain. Patient mental status and not at baseline. Medication review completed and medications adjusted. Will get daily labs and chest xray this am and monitor.     12/28/21 [facility] patient is more awake and alert this am, still lethargic and falls asleep easily but improvement from yesterday. Daughter is at bedside updated on patients condition. She reports that patient has intermittent abdominal pain will get xray today     12/29/21 [facility] patient's mental status is close to baseline and denies any pain or discomfort at this time. Will continue treatment, await final cultures, continue PT    12/30/21 [facility] Patient mental status is back to baseline. She is off of oxygen, will continue with nutrition and PT      Interval History: see hospital course    Review of Systems   Constitutional: Positive for activity change, appetite change and fatigue.   Respiratory: Negative for shortness of breath.    Cardiovascular: Negative for chest pain and leg swelling.   Gastrointestinal: Positive for abdominal pain.   Neurological: Positive for weakness.   Psychiatric/Behavioral: Positive for confusion.     Objective:     Vital Signs (Most Recent):  Temp: 97.3 °F  (36.3 °C) (12/30/21 0747)  Pulse: 76 (12/30/21 0826)  Resp: 18 (12/30/21 0826)  BP: (!) 148/65 (12/30/21 0747)  SpO2: 95 % (12/30/21 0826) Vital Signs (24h Range):  Temp:  [97.3 °F (36.3 °C)-99.2 °F (37.3 °C)] 97.3 °F (36.3 °C)  Pulse:  [] 76  Resp:  [18-22] 18  SpO2:  [92 %-98 %] 95 %  BP: (147-193)/(64-88) 148/65     Weight: 112.3 kg (247 lb 8 oz)  Body mass index is 43.84 kg/m².    Intake/Output Summary (Last 24 hours) at 12/30/2021 0922  Last data filed at 12/30/2021 0509  Gross per 24 hour   Intake 2846 ml   Output 2850 ml   Net -4 ml      Physical Exam  Constitutional:       Appearance: She is obese. She is ill-appearing.   HENT:      Head: Normocephalic.      Mouth/Throat:      Mouth: Mucous membranes are moist.   Cardiovascular:      Rate and Rhythm: Normal rate and regular rhythm.   Pulmonary:      Effort: Pulmonary effort is normal.      Comments: Decreased to bases   Abdominal:      General: Bowel sounds are normal. There is distension.      Palpations: Abdomen is soft.   Musculoskeletal:         General: No swelling.      Right lower leg: Edema (mild) present.      Left lower leg: Edema (mild) present.   Neurological:      Mental Status: She is lethargic and disoriented.      Motor: Weakness present.         Significant Labs:   All pertinent labs within the past 24 hours have been reviewed.  CBC:   Recent Labs   Lab 12/29/21 0524 12/30/21 0321   WBC 5.70 6.08   HGB 10.1* 10.7*   HCT 33.6* 34.4*   * 129*     CMP:   Recent Labs   Lab 12/29/21 0524 12/30/21  0321   * 143   K 5.9* 4.4   * 110   CO2 27 28   * 219*   BUN 55* 37*   CREATININE 1.3 1.0   CALCIUM 9.2 8.8   PROT 6.3 6.2   ALBUMIN 2.7* 2.7*   BILITOT 0.5 0.5   ALKPHOS 90 82   AST 9* 12   ALT 17 19   ANIONGAP 5* 5*   EGFRNONAA 37.8* 51.9*     Lactic Acid: No results for input(s): LACTATE in the last 48 hours.  Magnesium:   Recent Labs   Lab 12/29/21  0524 12/30/21  0321   MG 2.2 1.7     POCT Glucose:   Recent Labs    Lab 12/29/21  1112 12/29/21  1609 12/29/21  2037   POCTGLUCOSE 253* 231* 214*     Blood culture [[id number]] Collected: 12/27/21 0955   Order Status: Completed Specimen: Blood Updated: 12/28/21 2012    Blood Culture, Routine No Growth to date     No Growth to date   Blood culture [[id number]] Collected: 12/27/21 0947   Order Status: Completed Specimen: Blood Updated: 12/28/21 2012    Blood Culture, Routine No Growth to date     No Growth to date   Urine culture [[id number]] Collected: 12/24/21 1043   Order Status: Completed Specimen: Urine Updated: 12/25/21 2343    Urine Culture, Routine No significant growth   Narrative:     Preferred Collection Type->Urine, Clean Catch   Specimen Source->Urine           Significant Imaging: Chest xray:   Bibasilar pleuroparenchymal opacities suspicious for effusions with adjacent atelectasis versus pneumonia.     KUB: Nonobstructive bowel gas pattern.     Chest xray: Diffuse mild heterogeneous opacification of the right lung, may reflect edema or pneumonia.     Small right pleural effusion.         Assessment/Plan:      * Encephalopathy  12/27/21 [facility]: patient is lethargic this am and encephalopathy is likely multifactorial will continue to treat for UTI with Rocephin and await final urine culture, get ABG today, medications adjusted and lyrica was held due to new medication. Continue to monitor.     12/28/21 [facility] slight improvement continue treatment and monitor    12/29/21 [facility] mental status close to baseline    12/30/21 [facility] resolved    Protein malnutrition  Add glucerna and encourage po intake      Abdominal pain  Get kub    12/29/21 [facility] see kub above, abdominal pain resolved    12/30/21 [facility] continue amitaza likely secondary to constipation    Acute respiratory failure with hypoxia and hypercarbia  Patient with Hypercapnic and Hypoxic Respiratory failure which is Acute.  she is not on home oxygen. Supplemental oxygen was provided and noted-  .   Signs/symptoms of respiratory  failure include- tachypnea and increased work of breathing. Contributing diagnoses includes - Pleural effusion and Pneumonia Labs and images were reviewed. Patient Has recent ABG, which has been reviewed. Will treat underlying causes and adjust management of respiratory failure as follows-     Continue monitoring oxygen, intermittent bipap and qhs     12/29/21 [facility] continue to wean oxygen as tolerates    12/30/21 [facility] patient on room air    DVT prophylaxis  lovenox       Hyperkalemia  Stop spironolactone and start lasix and monitor labs daily    12/28/21 [facility] continue lokelma, add low dose ivf and continue to monitor.     12/29/21 [facility] labs improving continue meds and continue to monitor    12/30/21 [facility] resolved with medications will stop lokelma and monitor.     Acute cystitis  IV antibiotics  Blood cultures pending    12/27/21 [facility] get blood cultures, continue rocephin and await final urine culture    12/28/21 [facility] prelim urine shows no growth await final continue rocephin    12/29/21 [facility] continue rocephin await final cultures    12/30/21 [facility]  Culture shows no growth    Pleural effusion    IV diuresis, will watch her urine output closely    12/27/21 [facility] get chest xray today, stop spironolactone and start lasix due to elevated potassium, continue oxygen get abg and monitor    12/28/21 [facility] see chest xray above, continue lasix and bipap intermittent and q hs    12/29/21 [facility] improving with lasix, continue to monitor.     12/30/21 [facility] stable      VTE Risk Mitigation (From admission, onward)         Ordered     enoxaparin injection 40 mg  Daily         12/24/21 1359     IP VTE HIGH RISK PATIENT  Once         12/24/21 1359     Place sequential compression device  Until discontinued         12/24/21 1359                Discharge Planning   KIANA:      Code Status: Full Code   Is the patient medically ready for discharge?:     Reason for patient still in hospital (select all that apply): Patient trending condition, Laboratory test  and Treatment  Discharge Plan A: Return to nursing home                  Bibi Strange NP  Department of Hospital Medicine   [facility]

## 2021-12-30 NOTE — ASSESSMENT & PLAN NOTE
12/27/21 [facility]: patient is lethargic this am and encephalopathy is likely multifactorial will continue to treat for UTI with Rocephin and await final urine culture, get ABG today, medications adjusted and lyrica was held due to new medication. Continue to monitor.     12/28/21 [facility] slight improvement continue treatment and monitor    12/29/21 [facility] mental status close to baseline    12/30/21 [facility] resolved

## 2021-12-30 NOTE — ASSESSMENT & PLAN NOTE
Get kub    12/29/21 [facility] see kub above, abdominal pain resolved    12/30/21 [facility] continue amitaza likely secondary to constipation

## 2021-12-30 NOTE — ASSESSMENT & PLAN NOTE
IV antibiotics  Blood cultures pending    12/27/21 [facility] get blood cultures, continue rocephin and await final urine culture    12/28/21 [facility] prelim urine shows no growth await final continue rocephin    12/29/21 [facility] continue rocephin await final cultures    12/30/21 [facility]  Culture shows no growth

## 2021-12-30 NOTE — PROGRESS NOTES
Physical Therapy Treatment    Patient Name:  Laisha Acosta   MRN:  [id number]    Recommendations:     Discharge Recommendations:  nursing facility, skilled   Discharge Equipment Recommendations:     Barriers to discharge: decreased fxnl mobility    Assessment:     Laisha Acosta is a 86 y.o. female admitted with a medical diagnosis of Encephalopathy.  She presents with the following impairments/functional limitations:  weakness,impaired endurance,impaired functional mobilty . Pt refused getting up to sit on side of bed even after reminding her of its benefits. .    Rehab Prognosis: Poor; patient would benefit from acute skilled PT services to address these deficits and reach maximum level of function.    Recent Surgery: * No surgery found *      Plan:     During this hospitalization, patient to be seen 6 x/week to address the identified rehab impairments via therapeutic activities,therapeutic exercises and progress toward the following goals:    · Plan of Care Expires:       Subjective     Chief Complaint:   Patient/Family Comments/goals:  Pain/Comfort:  ·        Objective:     Communicated with nurse prior to session.  Patient found supine with   upon PT entry to room.     General Precautions: Standard, fall   Orthopedic Precautions:    Braces:    Respiratory Status: Room air     Functional Mobility:  · Pt refused to participate with fxn'l mobility exercises      AM-PAC 6 CLICK MOBILITY          Therapeutic Activities and Exercises:   pt performed arom/aarom exs lle and PROM exs rle 2 sets 15 reps    Patient left supine with call button in reach..    GOALS:   Multidisciplinary Problems     Physical Therapy Goals     Not on file                Time Tracking:     PT Received On:    PT Start Time: 1600     PT Stop Time: 1615  PT Total Time (min): 15 min     Billable Minutes: Therapeutic Exercise 15                   12/30/2021

## 2021-12-30 NOTE — SUBJECTIVE & OBJECTIVE
Interval History: see hospital course    Review of Systems   Constitutional: Positive for activity change, appetite change and fatigue.   Respiratory: Negative for shortness of breath.    Cardiovascular: Negative for chest pain and leg swelling.   Gastrointestinal: Positive for abdominal pain.   Neurological: Positive for weakness.   Psychiatric/Behavioral: Positive for confusion.     Objective:     Vital Signs (Most Recent):  Temp: 97.3 °F (36.3 °C) (12/30/21 0747)  Pulse: 76 (12/30/21 0826)  Resp: 18 (12/30/21 0826)  BP: (!) 148/65 (12/30/21 0747)  SpO2: 95 % (12/30/21 0826) Vital Signs (24h Range):  Temp:  [97.3 °F (36.3 °C)-99.2 °F (37.3 °C)] 97.3 °F (36.3 °C)  Pulse:  [] 76  Resp:  [18-22] 18  SpO2:  [92 %-98 %] 95 %  BP: (147-193)/(64-88) 148/65     Weight: 112.3 kg (247 lb 8 oz)  Body mass index is 43.84 kg/m².    Intake/Output Summary (Last 24 hours) at 12/30/2021 0922  Last data filed at 12/30/2021 0509  Gross per 24 hour   Intake 2846 ml   Output 2850 ml   Net -4 ml      Physical Exam  Constitutional:       Appearance: She is obese. She is ill-appearing.   HENT:      Head: Normocephalic.      Mouth/Throat:      Mouth: Mucous membranes are moist.   Cardiovascular:      Rate and Rhythm: Normal rate and regular rhythm.   Pulmonary:      Effort: Pulmonary effort is normal.      Comments: Decreased to bases   Abdominal:      General: Bowel sounds are normal. There is distension.      Palpations: Abdomen is soft.   Musculoskeletal:         General: No swelling.      Right lower leg: Edema (mild) present.      Left lower leg: Edema (mild) present.   Neurological:      Mental Status: She is lethargic and disoriented.      Motor: Weakness present.         Significant Labs:   All pertinent labs within the past 24 hours have been reviewed.  CBC:   Recent Labs   Lab 12/29/21  0524 12/30/21  0321   WBC 5.70 6.08   HGB 10.1* 10.7*   HCT 33.6* 34.4*   * 129*     CMP:   Recent Labs   Lab 12/29/21  0550  12/30/21  0321   * 143   K 5.9* 4.4   * 110   CO2 27 28   * 219*   BUN 55* 37*   CREATININE 1.3 1.0   CALCIUM 9.2 8.8   PROT 6.3 6.2   ALBUMIN 2.7* 2.7*   BILITOT 0.5 0.5   ALKPHOS 90 82   AST 9* 12   ALT 17 19   ANIONGAP 5* 5*   EGFRNONAA 37.8* 51.9*     Lactic Acid: No results for input(s): LACTATE in the last 48 hours.  Magnesium:   Recent Labs   Lab 12/29/21  0524 12/30/21  0321   MG 2.2 1.7     POCT Glucose:   Recent Labs   Lab 12/29/21  1112 12/29/21  1609 12/29/21  2037   POCTGLUCOSE 253* 231* 214*     Blood culture [[id number]] Collected: 12/27/21 0955   Order Status: Completed Specimen: Blood Updated: 12/28/21 2012    Blood Culture, Routine No Growth to date     No Growth to date   Blood culture [[id number]] Collected: 12/27/21 0947   Order Status: Completed Specimen: Blood Updated: 12/28/21 2012    Blood Culture, Routine No Growth to date     No Growth to date   Urine culture [[id number]] Collected: 12/24/21 1043   Order Status: Completed Specimen: Urine Updated: 12/25/21 2343    Urine Culture, Routine No significant growth   Narrative:     Preferred Collection Type->Urine, Clean Catch   Specimen Source->Urine           Significant Imaging: Chest xray:   Bibasilar pleuroparenchymal opacities suspicious for effusions with adjacent atelectasis versus pneumonia.     KUB: Nonobstructive bowel gas pattern.     Chest xray: Diffuse mild heterogeneous opacification of the right lung, may reflect edema or pneumonia.     Small right pleural effusion.

## 2021-12-30 NOTE — ASSESSMENT & PLAN NOTE
Patient with Hypercapnic and Hypoxic Respiratory failure which is Acute.  she is not on home oxygen. Supplemental oxygen was provided and noted-  .   Signs/symptoms of respiratory failure include- tachypnea and increased work of breathing. Contributing diagnoses includes - Pleural effusion and Pneumonia Labs and images were reviewed. Patient Has recent ABG, which has been reviewed. Will treat underlying causes and adjust management of respiratory failure as follows-     Continue monitoring oxygen, intermittent bipap and qhs     12/29/21 [facility] continue to wean oxygen as tolerates    12/30/21 [facility] patient on room air

## 2021-12-31 LAB
ALBUMIN SERPL BCP-MCNC: 2.8 G/DL (ref 3.5–5.2)
ALP SERPL-CCNC: 86 U/L (ref 55–135)
ALT SERPL W/O P-5'-P-CCNC: 21 U/L (ref 10–44)
ANION GAP SERPL CALC-SCNC: 7 MMOL/L (ref 8–16)
AST SERPL-CCNC: 16 U/L (ref 10–40)
BASOPHILS # BLD AUTO: 0.03 K/UL (ref 0–0.2)
BASOPHILS NFR BLD: 0.4 % (ref 0–1.9)
BILIRUB SERPL-MCNC: 0.5 MG/DL (ref 0.1–1)
BUN SERPL-MCNC: 27 MG/DL (ref 8–23)
CALCIUM SERPL-MCNC: 9.1 MG/DL (ref 8.7–10.5)
CHLORIDE SERPL-SCNC: 107 MMOL/L (ref 95–110)
CO2 SERPL-SCNC: 29 MMOL/L (ref 23–29)
CREAT SERPL-MCNC: 0.9 MG/DL (ref 0.5–1.4)
DIFFERENTIAL METHOD: ABNORMAL
EOSINOPHIL # BLD AUTO: 0.1 K/UL (ref 0–0.5)
EOSINOPHIL NFR BLD: 1.6 % (ref 0–8)
ERYTHROCYTE [DISTWIDTH] IN BLOOD BY AUTOMATED COUNT: 13.2 % (ref 11.5–14.5)
EST. GFR  (AFRICAN AMERICAN): >60 ML/MIN/1.73 M^2
EST. GFR  (NON AFRICAN AMERICAN): 58.9 ML/MIN/1.73 M^2
GLUCOSE SERPL-MCNC: 168 MG/DL (ref 70–110)
HCT VFR BLD AUTO: 36.9 % (ref 37–48.5)
HGB BLD-MCNC: 11.4 G/DL (ref 12–16)
IMM GRANULOCYTES # BLD AUTO: 0.11 K/UL (ref 0–0.04)
IMM GRANULOCYTES NFR BLD AUTO: 1.6 % (ref 0–0.5)
LYMPHOCYTES # BLD AUTO: 1.1 K/UL (ref 1–4.8)
LYMPHOCYTES NFR BLD: 17.1 % (ref 18–48)
MAGNESIUM SERPL-MCNC: 1.7 MG/DL (ref 1.6–2.6)
MCH RBC QN AUTO: 29.5 PG (ref 27–31)
MCHC RBC AUTO-ENTMCNC: 30.9 G/DL (ref 32–36)
MCV RBC AUTO: 96 FL (ref 82–98)
MONOCYTES # BLD AUTO: 0.8 K/UL (ref 0.3–1)
MONOCYTES NFR BLD: 11.5 % (ref 4–15)
NEUTROPHILS # BLD AUTO: 4.5 K/UL (ref 1.8–7.7)
NEUTROPHILS NFR BLD: 67.8 % (ref 38–73)
NRBC BLD-RTO: 0 /100 WBC
PLATELET # BLD AUTO: 140 K/UL (ref 150–450)
PMV BLD AUTO: 12.6 FL (ref 9.2–12.9)
POCT GLUCOSE: 162 MG/DL (ref 70–110)
POCT GLUCOSE: 195 MG/DL (ref 70–110)
POCT GLUCOSE: 223 MG/DL (ref 70–110)
POCT GLUCOSE: 319 MG/DL (ref 70–110)
POTASSIUM SERPL-SCNC: 4.2 MMOL/L (ref 3.5–5.1)
PROT SERPL-MCNC: 6.4 G/DL (ref 6–8.4)
RBC # BLD AUTO: 3.86 M/UL (ref 4–5.4)
SODIUM SERPL-SCNC: 143 MMOL/L (ref 136–145)
WBC # BLD AUTO: 6.67 K/UL (ref 3.9–12.7)

## 2021-12-31 PROCEDURE — 97530 THERAPEUTIC ACTIVITIES: CPT

## 2021-12-31 PROCEDURE — 99900031 HC PATIENT EDUCATION (STAT)

## 2021-12-31 PROCEDURE — 25000242 PHARM REV CODE 250 ALT 637 W/ HCPCS: Performed by: INTERNAL MEDICINE

## 2021-12-31 PROCEDURE — 94660 CPAP INITIATION&MGMT: CPT

## 2021-12-31 PROCEDURE — 27000221 HC OXYGEN, UP TO 24 HOURS

## 2021-12-31 PROCEDURE — 94761 N-INVAS EAR/PLS OXIMETRY MLT: CPT

## 2021-12-31 PROCEDURE — 97110 THERAPEUTIC EXERCISES: CPT | Mod: CQ

## 2021-12-31 PROCEDURE — 99900035 HC TECH TIME PER 15 MIN (STAT)

## 2021-12-31 PROCEDURE — 25000003 PHARM REV CODE 250: Performed by: NURSE PRACTITIONER

## 2021-12-31 PROCEDURE — 11000001 HC ACUTE MED/SURG PRIVATE ROOM

## 2021-12-31 PROCEDURE — 36415 COLL VENOUS BLD VENIPUNCTURE: CPT | Performed by: NURSE PRACTITIONER

## 2021-12-31 PROCEDURE — 85025 COMPLETE CBC W/AUTO DIFF WBC: CPT | Performed by: NURSE PRACTITIONER

## 2021-12-31 PROCEDURE — 94640 AIRWAY INHALATION TREATMENT: CPT

## 2021-12-31 PROCEDURE — 25000003 PHARM REV CODE 250: Performed by: INTERNAL MEDICINE

## 2021-12-31 PROCEDURE — 80053 COMPREHEN METABOLIC PANEL: CPT | Performed by: NURSE PRACTITIONER

## 2021-12-31 PROCEDURE — 83735 ASSAY OF MAGNESIUM: CPT | Performed by: NURSE PRACTITIONER

## 2021-12-31 PROCEDURE — 63600175 PHARM REV CODE 636 W HCPCS: Performed by: STUDENT IN AN ORGANIZED HEALTH CARE EDUCATION/TRAINING PROGRAM

## 2021-12-31 RX ADMIN — FUROSEMIDE 40 MG: 40 TABLET ORAL at 09:12

## 2021-12-31 RX ADMIN — INSULIN ASPART 2 UNITS: 100 INJECTION, SOLUTION INTRAVENOUS; SUBCUTANEOUS at 04:12

## 2021-12-31 RX ADMIN — ALBUTEROL SULFATE 2.5 MG: 2.5 SOLUTION RESPIRATORY (INHALATION) at 07:12

## 2021-12-31 RX ADMIN — ATORVASTATIN CALCIUM 10 MG: 10 TABLET, FILM COATED ORAL at 09:12

## 2021-12-31 RX ADMIN — PRAMIPEXOLE DIHYDROCHLORIDE 1 MG: 0.5 TABLET ORAL at 09:12

## 2021-12-31 RX ADMIN — MIRTAZAPINE 7.5 MG: 7.5 TABLET ORAL at 08:12

## 2021-12-31 RX ADMIN — CEFTRIAXONE 1 G: 1 INJECTION, SOLUTION INTRAVENOUS at 11:12

## 2021-12-31 RX ADMIN — LEVOTHYROXINE SODIUM 125 MCG: 125 TABLET ORAL at 05:12

## 2021-12-31 RX ADMIN — ASPIRIN 81 MG: 81 TABLET, COATED ORAL at 09:12

## 2021-12-31 RX ADMIN — ACETAMINOPHEN 650 MG: 325 TABLET ORAL at 09:12

## 2021-12-31 RX ADMIN — INSULIN DETEMIR 20 UNITS: 100 INJECTION, SOLUTION SUBCUTANEOUS at 08:12

## 2021-12-31 RX ADMIN — FLUTICASONE PROPIONATE 50 MCG: 50 SPRAY, METERED NASAL at 09:12

## 2021-12-31 RX ADMIN — ACETAMINOPHEN 650 MG: 325 TABLET ORAL at 08:12

## 2021-12-31 RX ADMIN — SERTRALINE HYDROCHLORIDE 50 MG: 50 TABLET ORAL at 08:12

## 2021-12-31 RX ADMIN — INSULIN ASPART 2 UNITS: 100 INJECTION, SOLUTION INTRAVENOUS; SUBCUTANEOUS at 08:12

## 2021-12-31 RX ADMIN — PRAMIPEXOLE DIHYDROCHLORIDE 1 MG: 0.5 TABLET ORAL at 08:12

## 2021-12-31 RX ADMIN — ENOXAPARIN SODIUM 40 MG: 40 INJECTION SUBCUTANEOUS at 04:12

## 2021-12-31 RX ADMIN — ALBUTEROL SULFATE 2.5 MG: 2.5 SOLUTION RESPIRATORY (INHALATION) at 01:12

## 2021-12-31 RX ADMIN — SODIUM CHLORIDE 50 ML/HR: 0.45 INJECTION, SOLUTION INTRAVENOUS at 11:12

## 2021-12-31 RX ADMIN — SODIUM CHLORIDE 50 ML/HR: 0.45 INJECTION, SOLUTION INTRAVENOUS at 01:12

## 2021-12-31 RX ADMIN — OXYCODONE HYDROCHLORIDE AND ACETAMINOPHEN 1 TABLET: 5; 325 TABLET ORAL at 11:12

## 2021-12-31 RX ADMIN — PANTOPRAZOLE SODIUM 40 MG: 40 TABLET, DELAYED RELEASE ORAL at 09:12

## 2021-12-31 RX ADMIN — DONEPEZIL HYDROCHLORIDE 10 MG: 5 TABLET, FILM COATED ORAL at 08:12

## 2021-12-31 RX ADMIN — INSULIN ASPART 8 UNITS: 100 INJECTION, SOLUTION INTRAVENOUS; SUBCUTANEOUS at 11:12

## 2021-12-31 RX ADMIN — INSULIN ASPART 2 UNITS: 100 INJECTION, SOLUTION INTRAVENOUS; SUBCUTANEOUS at 05:12

## 2021-12-31 NOTE — ASSESSMENT & PLAN NOTE
12/27/21 [facility]: patient is lethargic this am and encephalopathy is likely multifactorial will continue to treat for UTI with Rocephin and await final urine culture, get ABG today, medications adjusted and lyrica was held due to new medication. Continue to monitor.     12/28/21 [facility] slight improvement continue treatment and monitor    12/29/21 [facility] mental status close to baseline    12/30/21 [facility] resolved    12/31/21 CG Resolved

## 2021-12-31 NOTE — PT/OT/SLP PROGRESS
Occupational Therapy   Treatment    Name: Laisha Acosta  MRN: [id number]  Admitting Diagnosis:  Encephalopathy       Recommendations:     Discharge Recommendations: nursing facility, skilled  Discharge Equipment Recommendations:  other (see comments),bath bench,bedside commode,grab bar,hospital bed,oxygen,walker, rolling (NF will provide)  Barriers to discharge:  Other (Comment) (Medical and functional status)    Assessment:     Laisha Acosta is a 86 y.o. female with a medical diagnosis of Encephalopathy. Performance deficits affecting function are weakness,impaired endurance,impaired self care skills,impaired functional mobilty,impaired balance,visual deficits,impaired cognition,decreased coordination,decreased upper extremity function,decreased lower extremity function,decreased safety awareness,pain,decreased ROM,impaired fine motor,edema,impaired cardiopulmonary response to activity.     Rehab Prognosis:  Good; patient would benefit from acute skilled OT services to address these deficits and reach maximum level of function.       Plan:     Patient to be seen 6 x/week to address the above listed problems via self-care/home management,community/work re-entry,therapeutic activities,therapeutic exercises,neuromuscular re-education  · Plan of Care Expires: 01/07/22  · Plan of Care Reviewed with: patient    Subjective     Pain/Comfort:  · Pain Rating 1: 6/10  · Location - Side 1: Right  · Location 1: leg  · Pain Addressed 1: Reposition,Cessation of Activity  · Pain Rating Post-Intervention 1: 0/10    Objective:     Communicated with: nurse prior to session.  Patient found supine with peripheral IV,PureWick upon OT entry to room.    General Precautions: Standard, fall   Orthopedic Precautions:N/A   Braces:    Respiratory Status: Room air     Occupational Performance:     Bed Mobility:    · Patient completed Rolling/Turning to Left with  maximal assistance  · Patient completed Rolling/Turning to Right with maximal  assistance  · Patient completed Scooting/Bridging with total assistance  · Patient completed Supine to Sit with total assistance  · Patient completed Sit to Supine with total assistance     Treatment & Education:  Pt was cooperative and motivated with moderate verbal encouragement while exhibiting positive affect. She performed bed mobility requiring max to total assist secondary to decreased assistance when rolling left <> right. Pt then participated in therapeutic activity addressing functional reaching, trunk control, sitting balance, posture, and energy for task / endurance challenging her to sit upright EOB unsupported for 15 min while utilizing bilateral UE's to reach in multiple planes requiring decreased steadying assist of trunk to sustain seated position when utilizing bilateral UE's to reach.    Patient left supine with all lines intact, call button in reach and nurse notifiedEducation:      GOALS:   Multidisciplinary Problems     Occupational Therapy Goals        Problem: Occupational Therapy Goal    Goal Priority Disciplines Outcome Interventions   Occupational Therapy Goal     OT, PT/OT Ongoing, Progressing    Description: Goals to be met by: 01/07/21     Patient will increase functional independence with ADLs by performing:    Feeding with Set-up Assistance.  UE Dressing with Moderate Assistance.  LE Dressing with Maximum Assistance.  Grooming while seated at sink with Minimal Assistance.  Toileting from bedside commode with Maximum Assistance for hygiene and clothing management.   Bathing from  edge of bed with Moderate Assistance.  Step transfer with Maximum Assistance  Toilet transfer to bedside commode with Maximum Assistance.  Increased functional strength to 3+/5 for bilateral UE's.                     Time Tracking:     OT Date of Treatment: 12/31/21  OT Start Time: 1635  OT Stop Time: 1700  OT Total Time (min): 25 min    Billable Minutes:Therapeutic Activity 25    OT/FARHAD: OT           12/31/2021

## 2021-12-31 NOTE — PT/OT/SLP PROGRESS
"Physical Therapy Treatment    Patient Name:  Laisha Acosta   MRN:  [id number]    Time Tracking:     PT Start Time: 0909     PT Stop Time: 0924  PT Total Time (min): 15 min     Billable Minutes: Therapeutic Exercise 15  Mary Beth Martinez, PTA  12/31/2021      Subjective     Patient/Family Comments/goals: Pt was emotional upon entry into room stating "someone just left out of here and was mean to me." Asked pt if there was anything she needed at this time and explained to her why I was there, and she verbalized understanding and expressed that she was "fine". Pt stated "I guess I'm just emotional." She was then agreeable to therapy.   Pain/Comfort: "tailbone"      Objective:     General Precautions: Standard, fall     Communicated with PT prior to session. Patient found HOB elevated upon PT entry to room.     Therapeutic Activities and Exercises:  Educated pt on skin breakdown precautions d/t noticing R heel tucked under L calf with indentation pressure and redness noted upon removal of R heel. F/B LE exercise encouragement along w/ continuation of HEP t/o her stay. She verbalized understanding. Pt completed MATIAS 15 reps: AROM  ankle pumps, AAROM heel slides, & AAROM hip abd/add. F/B assisted bed sit ups 3x5 reps to promote pre-OOB sit<>stand functional strengthening. Then educated pt on GS/mini bridge for pressure relief to LB. Assisted pt w/ repositioning to improve comfort.     Patient left HOB elevated with all lines intact and call button in reach.    GOALS:   STGs  1. Pt will be maxa with bed mobility  2. Pt will be maxa with transfers bed to a        Assessment:     Laisha Acosta is a 86 y.o. female admitted with a medical diagnosis of Encephalopathy. Pt needed gentle consoling for participation encouragment. However, she was still limited on effort.       Plan:     During this hospitalization, patient to be seen 6 x/week to address the identified rehab impairments therapeutic activities,therapeutic exercises and " progress toward the goals.      Recommendations:     Cont to progress as tolerated.

## 2021-12-31 NOTE — PROGRESS NOTES
[facility] Medicine  Progress Note    Patient Name: Laisha Acosta  MRN: [id number]  Patient Class: IP- Inpatient   Admission Date: 12/24/2021  Length of Stay: 7 days  Attending Physician: Jo Underwood MD  Primary Care Provider: Jo Underwood MD        Subjective:     Principal Problem:Encephalopathy        HPI:  86-year-old female history of dementia, diabetes, hypertension, obesity brought in by EMS for increased confusion and shortness of breath for the past few days.  Daughter said that patient has been having increased cough with wheezing.  Has had her COVID vaccine.  History otherwise limited due to medical condition      Overview/Hospital Course:  12/24 Given antibiotics, admit to floor    12/27/21 [facility] patient is lethargic, complaining of butt pain. Patient mental status and not at baseline. Medication review completed and medications adjusted. Will get daily labs and chest xray this am and monitor.     12/28/21 [facility] patient is more awake and alert this am, still lethargic and falls asleep easily but improvement from yesterday. Daughter is at bedside updated on patients condition. She reports that patient has intermittent abdominal pain will get xray today     12/29/21 [facility] patient's mental status is close to baseline and denies any pain or discomfort at this time. Will continue treatment, await final cultures, continue PT    12/30/21 [facility] Patient mental status is back to baseline. She is off of oxygen, will continue with nutrition and PT    12/31/21 CG: Doing well. Mentation is okay. Complains of sores on her bottom      No new subjective & objective note has been filed under this hospital service since the last note was generated.      Assessment/Plan:      * Encephalopathy  12/27/21 [facility]: patient is lethargic this am and encephalopathy is likely multifactorial will continue to treat for UTI with Rocephin and await final urine culture, get ABG today, medications adjusted and lyrica  was held due to new medication. Continue to monitor.     12/28/21 [facility] slight improvement continue treatment and monitor    12/29/21 [facility] mental status close to baseline    12/30/21 [facility] resolved    12/31/21 CG Resolved     Protein malnutrition  Add glucerna and encourage po intake      Abdominal pain  Get kub    12/29/21 [facility] see kub above, abdominal pain resolved    12/30/21 [facility] continue amitaza likely secondary to constipation    Acute respiratory failure with hypoxia and hypercarbia  Patient with Hypercapnic and Hypoxic Respiratory failure which is Acute.  she is not on home oxygen. Supplemental oxygen was provided and noted-  .   Signs/symptoms of respiratory failure include- tachypnea and increased work of breathing. Contributing diagnoses includes - Pleural effusion and Pneumonia Labs and images were reviewed. Patient Has recent ABG, which has been reviewed. Will treat underlying causes and adjust management of respiratory failure as follows-     Continue monitoring oxygen, intermittent bipap and qhs     12/29/21 [facility] continue to wean oxygen as tolerates    12/30/21 [facility] patient on room air    DVT prophylaxis  lovenox       Hyperkalemia  Stop spironolactone and start lasix and monitor labs daily    12/28/21 [facility] continue lokelma, add low dose ivf and continue to monitor.     12/29/21 [facility] labs improving continue meds and continue to monitor    12/30/21 [facility] resolved with medications will stop lokelma and monitor.     Acute cystitis  IV antibiotics  Blood cultures pending    12/27/21 [facility] get blood cultures, continue rocephin and await final urine culture    12/28/21 [facility] prelim urine shows no growth await final continue rocephin    12/29/21 [facility] continue rocephin await final cultures    12/30/21 [facility]  Culture shows no growth    Pleural effusion    IV diuresis, will watch her urine output closely    12/27/21 [facility] get chest xray today, stop spironolactone and start lasix due to elevated potassium, continue oxygen get abg and  monitor    12/28/21 [facility] see chest xray above, continue lasix and bipap intermittent and q hs    12/29/21 [facility] improving with lasix, continue to monitor.     12/30/21 [facility] stable      VTE Risk Mitigation (From admission, onward)         Ordered     enoxaparin injection 40 mg  Daily         12/24/21 1359     IP VTE HIGH RISK PATIENT  Once         12/24/21 1359     Place sequential compression device  Until discontinued         12/24/21 1359                Discharge Planning   KIANA:      Code Status: Full Code   Is the patient medically ready for discharge?:     Reason for patient still in hospital (select all that apply): Treatment  Discharge Plan A: Return to nursing home          Likely discharge in next 24 hours        Abdiel Macias DO  Department of Hospital Medicine   [facility]

## 2021-12-31 NOTE — PLAN OF CARE
Problem: Adult Inpatient Plan of Care  Goal: Plan of Care Review  Outcome: Ongoing, Progressing     Problem: Adult Inpatient Plan of Care  Goal: Absence of Hospital-Acquired Illness or Injury  Outcome: Ongoing, Progressing     Problem: Fall Injury Risk  Goal: Absence of Fall and Fall-Related Injury  Outcome: Ongoing, Progressing     Problem: Skin Injury Risk Increased  Goal: Skin Health and Integrity  Outcome: Ongoing, Progressing     Plan of care reviewed with patient.

## 2022-01-01 ENCOUNTER — CLINICAL SUPPORT (OUTPATIENT)
Dept: CARDIOLOGY | Facility: HOSPITAL | Age: 87
DRG: 689 | End: 2022-01-01
Attending: INTERNAL MEDICINE
Payer: MEDICARE

## 2022-01-01 ENCOUNTER — HOSPITAL ENCOUNTER (EMERGENCY)
Facility: HOSPITAL | Age: 87
Discharge: HOME | End: 2022-09-11
Attending: STUDENT IN AN ORGANIZED HEALTH CARE EDUCATION/TRAINING PROGRAM
Payer: MEDICARE

## 2022-01-01 ENCOUNTER — LAB VISIT (OUTPATIENT)
Dept: LAB | Facility: HOSPITAL | Age: 87
End: 2022-01-01
Attending: INTERNAL MEDICINE
Payer: OTHER MISCELLANEOUS

## 2022-01-01 ENCOUNTER — HOSPITAL ENCOUNTER (INPATIENT)
Facility: HOSPITAL | Age: 87
LOS: 10 days | Discharge: NURSING, REHAB OR LONG-TERM CARE | DRG: 689 | End: 2022-06-20
Attending: STUDENT IN AN ORGANIZED HEALTH CARE EDUCATION/TRAINING PROGRAM | Admitting: INTERNAL MEDICINE
Payer: MEDICARE

## 2022-01-01 ENCOUNTER — HOSPITAL ENCOUNTER (EMERGENCY)
Facility: HOSPITAL | Age: 87
Discharge: HOME | End: 2022-08-15
Attending: EMERGENCY MEDICINE
Payer: MEDICARE

## 2022-01-01 ENCOUNTER — HOSPITAL ENCOUNTER (INPATIENT)
Facility: HOSPITAL | Age: 87
LOS: 6 days | Discharge: HOME | DRG: 281 | End: 2022-08-01
Attending: EMERGENCY MEDICINE | Admitting: EMERGENCY MEDICINE
Payer: MEDICARE

## 2022-01-01 VITALS
DIASTOLIC BLOOD PRESSURE: 71 MMHG | SYSTOLIC BLOOD PRESSURE: 152 MMHG | WEIGHT: 200 LBS | BODY MASS INDEX: 35.44 KG/M2 | SYSTOLIC BLOOD PRESSURE: 160 MMHG | DIASTOLIC BLOOD PRESSURE: 61 MMHG | OXYGEN SATURATION: 99 % | HEIGHT: 63 IN | WEIGHT: 200 LBS | RESPIRATION RATE: 20 BRPM | HEIGHT: 63 IN | HEART RATE: 92 BPM | BODY MASS INDEX: 35.44 KG/M2 | TEMPERATURE: 98 F

## 2022-01-01 VITALS
WEIGHT: 209 LBS | RESPIRATION RATE: 27 BRPM | TEMPERATURE: 98 F | SYSTOLIC BLOOD PRESSURE: 145 MMHG | HEART RATE: 77 BPM | BODY MASS INDEX: 37.02 KG/M2 | OXYGEN SATURATION: 97 % | DIASTOLIC BLOOD PRESSURE: 84 MMHG

## 2022-01-01 VITALS
RESPIRATION RATE: 16 BRPM | TEMPERATURE: 97 F | OXYGEN SATURATION: 95 % | SYSTOLIC BLOOD PRESSURE: 112 MMHG | HEART RATE: 68 BPM | BODY MASS INDEX: 37.21 KG/M2 | WEIGHT: 210 LBS | HEIGHT: 63 IN | DIASTOLIC BLOOD PRESSURE: 65 MMHG

## 2022-01-01 VITALS
WEIGHT: 214.13 LBS | SYSTOLIC BLOOD PRESSURE: 124 MMHG | TEMPERATURE: 98 F | BODY MASS INDEX: 37.94 KG/M2 | RESPIRATION RATE: 24 BRPM | DIASTOLIC BLOOD PRESSURE: 58 MMHG | OXYGEN SATURATION: 96 % | HEART RATE: 81 BPM | HEIGHT: 63 IN

## 2022-01-01 DIAGNOSIS — I50.9 CONGESTIVE HEART FAILURE, UNSPECIFIED HF CHRONICITY, UNSPECIFIED HEART FAILURE TYPE: ICD-10-CM

## 2022-01-01 DIAGNOSIS — N39.0 URINARY TRACT INFECTION WITHOUT HEMATURIA, SITE UNSPECIFIED: ICD-10-CM

## 2022-01-01 DIAGNOSIS — R06.02 SHORTNESS OF BREATH: ICD-10-CM

## 2022-01-01 DIAGNOSIS — K92.2 GASTROINTESTINAL HEMORRHAGE, UNSPECIFIED GASTROINTESTINAL HEMORRHAGE TYPE: Primary | ICD-10-CM

## 2022-01-01 DIAGNOSIS — N39.0 UTI (URINARY TRACT INFECTION): ICD-10-CM

## 2022-01-01 DIAGNOSIS — R06.02 SOB (SHORTNESS OF BREATH): ICD-10-CM

## 2022-01-01 DIAGNOSIS — I50.9 ACUTE ON CHRONIC CONGESTIVE HEART FAILURE, UNSPECIFIED HEART FAILURE TYPE: Primary | ICD-10-CM

## 2022-01-01 DIAGNOSIS — I50.9 HEART FAILURE, UNSPECIFIED: Primary | ICD-10-CM

## 2022-01-01 DIAGNOSIS — N30.01 ACUTE CYSTITIS WITH HEMATURIA: Primary | ICD-10-CM

## 2022-01-01 DIAGNOSIS — K92.2 GASTROINTESTINAL HEMORRHAGE: ICD-10-CM

## 2022-01-01 DIAGNOSIS — I38 VALVULAR HEART DISEASE: ICD-10-CM

## 2022-01-01 DIAGNOSIS — Z01.818 PRE-OP TESTING: ICD-10-CM

## 2022-01-01 DIAGNOSIS — I50.9 CONGESTIVE HEART FAILURE: ICD-10-CM

## 2022-01-01 DIAGNOSIS — E16.2 HYPOGLYCEMIA: Primary | ICD-10-CM

## 2022-01-01 LAB
ABO + RH BLD: NORMAL
ABO + RH BLD: NORMAL
ALBUMIN SERPL BCP-MCNC: 2 G/DL (ref 3.5–5.2)
ALBUMIN SERPL BCP-MCNC: 2.1 G/DL (ref 3.5–5.2)
ALBUMIN SERPL BCP-MCNC: 2.1 G/DL (ref 3.5–5.2)
ALBUMIN SERPL BCP-MCNC: 2.2 G/DL (ref 3.5–5.2)
ALBUMIN SERPL BCP-MCNC: 2.2 G/DL (ref 3.5–5.2)
ALBUMIN SERPL BCP-MCNC: 2.3 G/DL (ref 3.5–5.2)
ALBUMIN SERPL BCP-MCNC: 2.4 G/DL (ref 3.5–5.2)
ALBUMIN SERPL BCP-MCNC: 2.5 G/DL (ref 3.5–5.2)
ALBUMIN SERPL BCP-MCNC: 2.6 G/DL (ref 3.5–5.2)
ALBUMIN SERPL BCP-MCNC: 2.6 G/DL (ref 3.5–5.2)
ALBUMIN SERPL BCP-MCNC: 2.7 G/DL (ref 3.5–5.2)
ALBUMIN SERPL BCP-MCNC: 3.2 G/DL (ref 3.5–5.2)
ALP SERPL-CCNC: 63 U/L (ref 55–135)
ALP SERPL-CCNC: 65 U/L (ref 55–135)
ALP SERPL-CCNC: 68 U/L (ref 55–135)
ALP SERPL-CCNC: 68 U/L (ref 55–135)
ALP SERPL-CCNC: 69 U/L (ref 55–135)
ALP SERPL-CCNC: 71 U/L (ref 55–135)
ALP SERPL-CCNC: 74 U/L (ref 55–135)
ALP SERPL-CCNC: 76 U/L (ref 55–135)
ALP SERPL-CCNC: 78 U/L (ref 55–135)
ALP SERPL-CCNC: 78 U/L (ref 55–135)
ALP SERPL-CCNC: 82 U/L (ref 55–135)
ALP SERPL-CCNC: 83 U/L (ref 55–135)
ALP SERPL-CCNC: 88 U/L (ref 55–135)
ALP SERPL-CCNC: 92 U/L (ref 55–135)
ALP SERPL-CCNC: 92 U/L (ref 55–135)
ALP SERPL-CCNC: 97 U/L (ref 55–135)
ALP SERPL-CCNC: 97 U/L (ref 55–135)
ALP SERPL-CCNC: 98 U/L (ref 55–135)
ALT SERPL W/O P-5'-P-CCNC: 10 U/L (ref 10–44)
ALT SERPL W/O P-5'-P-CCNC: 11 U/L (ref 10–44)
ALT SERPL W/O P-5'-P-CCNC: 12 U/L (ref 10–44)
ALT SERPL W/O P-5'-P-CCNC: 13 U/L (ref 10–44)
ALT SERPL W/O P-5'-P-CCNC: 14 U/L (ref 10–44)
ALT SERPL W/O P-5'-P-CCNC: 14 U/L (ref 10–44)
ALT SERPL W/O P-5'-P-CCNC: 15 U/L (ref 10–44)
ALT SERPL W/O P-5'-P-CCNC: 23 U/L (ref 10–44)
ALT SERPL W/O P-5'-P-CCNC: 9 U/L (ref 10–44)
ANION GAP SERPL CALC-SCNC: 2 MMOL/L (ref 8–16)
ANION GAP SERPL CALC-SCNC: 3 MMOL/L (ref 8–16)
ANION GAP SERPL CALC-SCNC: 4 MMOL/L (ref 8–16)
ANION GAP SERPL CALC-SCNC: 4 MMOL/L (ref 8–16)
ANION GAP SERPL CALC-SCNC: 5 MMOL/L (ref 8–16)
ANION GAP SERPL CALC-SCNC: 6 MMOL/L (ref 8–16)
ANION GAP SERPL CALC-SCNC: 7 MMOL/L (ref 8–16)
ANION GAP SERPL CALC-SCNC: 8 MMOL/L (ref 8–16)
ANION GAP SERPL CALC-SCNC: 9 MMOL/L (ref 8–16)
AORTIC ROOT ANNULUS: 3.32 CM
AORTIC VALVE CUSP SEPERATION: 0.55 CM
APTT BLDCRRT: 23.2 SEC (ref 21–32)
AST SERPL-CCNC: 10 U/L (ref 10–40)
AST SERPL-CCNC: 11 U/L (ref 10–40)
AST SERPL-CCNC: 12 U/L (ref 10–40)
AST SERPL-CCNC: 14 U/L (ref 10–40)
AST SERPL-CCNC: 14 U/L (ref 10–40)
AST SERPL-CCNC: 4 U/L (ref 10–40)
AST SERPL-CCNC: 5 U/L (ref 10–40)
AST SERPL-CCNC: 6 U/L (ref 10–40)
AST SERPL-CCNC: 6 U/L (ref 10–40)
AST SERPL-CCNC: 7 U/L (ref 10–40)
AST SERPL-CCNC: 8 U/L (ref 10–40)
AST SERPL-CCNC: 9 U/L (ref 10–40)
AST SERPL-CCNC: 9 U/L (ref 10–40)
BACTERIA #/AREA URNS HPF: ABNORMAL /HPF
BACTERIA #/AREA URNS HPF: ABNORMAL /HPF
BACTERIA #/AREA URNS HPF: NEGATIVE /HPF
BACTERIA BLD CULT: NORMAL
BACTERIA BLD CULT: NORMAL
BACTERIA UR CULT: ABNORMAL
BACTERIA UR CULT: ABNORMAL
BASOPHILS # BLD AUTO: 0.02 K/UL (ref 0–0.2)
BASOPHILS # BLD AUTO: 0.03 K/UL (ref 0–0.2)
BASOPHILS # BLD AUTO: 0.04 K/UL (ref 0–0.2)
BASOPHILS # BLD AUTO: 0.05 K/UL (ref 0–0.2)
BASOPHILS NFR BLD: 0.2 % (ref 0–1.9)
BASOPHILS NFR BLD: 0.3 % (ref 0–1.9)
BASOPHILS NFR BLD: 0.3 % (ref 0–1.9)
BASOPHILS NFR BLD: 0.4 % (ref 0–1.9)
BASOPHILS NFR BLD: 0.5 % (ref 0–1.9)
BASOPHILS NFR BLD: 0.5 % (ref 0–1.9)
BASOPHILS NFR BLD: 0.6 % (ref 0–1.9)
BASOPHILS NFR BLD: 0.7 % (ref 0–1.9)
BASOPHILS NFR BLD: 0.8 % (ref 0–1.9)
BASOPHILS NFR BLD: 0.9 % (ref 0–1.9)
BASOPHILS NFR BLD: 0.9 % (ref 0–1.9)
BASOPHILS NFR BLD: 1 % (ref 0–1.9)
BILIRUB SERPL-MCNC: 0.2 MG/DL (ref 0.1–1)
BILIRUB SERPL-MCNC: 0.2 MG/DL (ref 0.1–1)
BILIRUB SERPL-MCNC: 0.3 MG/DL (ref 0.1–1)
BILIRUB SERPL-MCNC: 0.4 MG/DL (ref 0.1–1)
BILIRUB SERPL-MCNC: 0.5 MG/DL (ref 0.1–1)
BILIRUB SERPL-MCNC: 0.5 MG/DL (ref 0.1–1)
BILIRUB SERPL-MCNC: 0.9 MG/DL (ref 0.1–1)
BILIRUB UR QL STRIP: NEGATIVE
BLD GP AB SCN CELLS X3 SERPL QL: NORMAL
BLD GP AB SCN CELLS X3 SERPL QL: NORMAL
BLD PROD TYP BPU: NORMAL
BLD PROD TYP BPU: NORMAL
BLOOD UNIT EXPIRATION DATE: NORMAL
BLOOD UNIT EXPIRATION DATE: NORMAL
BLOOD UNIT TYPE CODE: 6200
BLOOD UNIT TYPE CODE: 6200
BLOOD UNIT TYPE: NORMAL
BLOOD UNIT TYPE: NORMAL
BSA FOR ECHO PROCEDURE: 2.01 M2
BUN SERPL-MCNC: 16 MG/DL (ref 8–23)
BUN SERPL-MCNC: 19 MG/DL (ref 8–23)
BUN SERPL-MCNC: 22 MG/DL (ref 8–23)
BUN SERPL-MCNC: 22 MG/DL (ref 8–23)
BUN SERPL-MCNC: 24 MG/DL (ref 8–23)
BUN SERPL-MCNC: 25 MG/DL (ref 8–23)
BUN SERPL-MCNC: 26 MG/DL (ref 8–23)
BUN SERPL-MCNC: 29 MG/DL (ref 8–23)
BUN SERPL-MCNC: 30 MG/DL (ref 8–23)
BUN SERPL-MCNC: 32 MG/DL (ref 8–23)
BUN SERPL-MCNC: 36 MG/DL (ref 8–23)
BUN SERPL-MCNC: 39 MG/DL (ref 8–23)
BUN SERPL-MCNC: 43 MG/DL (ref 8–23)
BUN SERPL-MCNC: 43 MG/DL (ref 8–23)
BUN SERPL-MCNC: 6 MG/DL (ref 8–23)
BUN SERPL-MCNC: 7 MG/DL (ref 8–23)
BUN SERPL-MCNC: 9 MG/DL (ref 8–23)
BUN SERPL-MCNC: 9 MG/DL (ref 8–23)
CALCIUM SERPL-MCNC: 7.7 MG/DL (ref 8.7–10.5)
CALCIUM SERPL-MCNC: 7.9 MG/DL (ref 8.7–10.5)
CALCIUM SERPL-MCNC: 8 MG/DL (ref 8.7–10.5)
CALCIUM SERPL-MCNC: 8.1 MG/DL (ref 8.7–10.5)
CALCIUM SERPL-MCNC: 8.3 MG/DL (ref 8.7–10.5)
CALCIUM SERPL-MCNC: 8.4 MG/DL (ref 8.7–10.5)
CALCIUM SERPL-MCNC: 8.4 MG/DL (ref 8.7–10.5)
CALCIUM SERPL-MCNC: 8.5 MG/DL (ref 8.7–10.5)
CALCIUM SERPL-MCNC: 8.8 MG/DL (ref 8.7–10.5)
CALCIUM SERPL-MCNC: 8.9 MG/DL (ref 8.7–10.5)
CALCIUM SERPL-MCNC: 9 MG/DL (ref 8.7–10.5)
CALCIUM SERPL-MCNC: 9.1 MG/DL (ref 8.7–10.5)
CHLORIDE SERPL-SCNC: 100 MMOL/L (ref 95–110)
CHLORIDE SERPL-SCNC: 100 MMOL/L (ref 95–110)
CHLORIDE SERPL-SCNC: 105 MMOL/L (ref 95–110)
CHLORIDE SERPL-SCNC: 106 MMOL/L (ref 95–110)
CHLORIDE SERPL-SCNC: 107 MMOL/L (ref 95–110)
CHLORIDE SERPL-SCNC: 108 MMOL/L (ref 95–110)
CHLORIDE SERPL-SCNC: 109 MMOL/L (ref 95–110)
CHLORIDE SERPL-SCNC: 113 MMOL/L (ref 95–110)
CHLORIDE SERPL-SCNC: 115 MMOL/L (ref 95–110)
CHLORIDE SERPL-SCNC: 115 MMOL/L (ref 95–110)
CHLORIDE SERPL-SCNC: 116 MMOL/L (ref 95–110)
CHLORIDE SERPL-SCNC: 118 MMOL/L (ref 95–110)
CHLORIDE SERPL-SCNC: 119 MMOL/L (ref 95–110)
CHLORIDE SERPL-SCNC: 99 MMOL/L (ref 95–110)
CLARITY UR: ABNORMAL
CLARITY UR: ABNORMAL
CLARITY UR: CLEAR
CO2 SERPL-SCNC: 23 MMOL/L (ref 23–29)
CO2 SERPL-SCNC: 24 MMOL/L (ref 23–29)
CO2 SERPL-SCNC: 24 MMOL/L (ref 23–29)
CO2 SERPL-SCNC: 25 MMOL/L (ref 23–29)
CO2 SERPL-SCNC: 25 MMOL/L (ref 23–29)
CO2 SERPL-SCNC: 26 MMOL/L (ref 23–29)
CO2 SERPL-SCNC: 26 MMOL/L (ref 23–29)
CO2 SERPL-SCNC: 27 MMOL/L (ref 23–29)
CO2 SERPL-SCNC: 28 MMOL/L (ref 23–29)
CO2 SERPL-SCNC: 29 MMOL/L (ref 23–29)
CO2 SERPL-SCNC: 29 MMOL/L (ref 23–29)
CO2 SERPL-SCNC: 31 MMOL/L (ref 23–29)
CO2 SERPL-SCNC: 32 MMOL/L (ref 23–29)
CO2 SERPL-SCNC: 32 MMOL/L (ref 23–29)
CO2 SERPL-SCNC: 33 MMOL/L (ref 23–29)
CO2 SERPL-SCNC: 33 MMOL/L (ref 23–29)
CO2 SERPL-SCNC: 36 MMOL/L (ref 23–29)
CO2 SERPL-SCNC: 38 MMOL/L (ref 23–29)
CO2 SERPL-SCNC: 38 MMOL/L (ref 23–29)
CO2 SERPL-SCNC: 40 MMOL/L (ref 23–29)
CODING SYSTEM: NORMAL
CODING SYSTEM: NORMAL
COLOR UR: YELLOW
CORRECTED TEMPERATURE (PCO2): 42 MMHG
CORRECTED TEMPERATURE (PH): 7.38
CORRECTED TEMPERATURE (PO2): 72.5 MMHG
CREAT SERPL-MCNC: 0.6 MG/DL (ref 0.5–1.4)
CREAT SERPL-MCNC: 0.7 MG/DL (ref 0.5–1.4)
CREAT SERPL-MCNC: 0.7 MG/DL (ref 0.5–1.4)
CREAT SERPL-MCNC: 0.8 MG/DL (ref 0.5–1.4)
CREAT SERPL-MCNC: 0.9 MG/DL (ref 0.5–1.4)
CREAT SERPL-MCNC: 1 MG/DL (ref 0.5–1.4)
CTP QC/QA: YES
CTP QC/QA: YES
CV ECHO LV RWT: 0.53 CM
DIFFERENTIAL METHOD: ABNORMAL
DISPENSE STATUS: NORMAL
DISPENSE STATUS: NORMAL
DOP CALC LVOT AREA: 3 CM2
DOP CALC LVOT DIAMETER: 1.97 CM
ECHO LV POSTERIOR WALL: 1.06 CM (ref 0.6–1.1)
EJECTION FRACTION: 65 %
EOSINOPHIL # BLD AUTO: 0 K/UL (ref 0–0.5)
EOSINOPHIL # BLD AUTO: 0.1 K/UL (ref 0–0.5)
EOSINOPHIL # BLD AUTO: 0.2 K/UL (ref 0–0.5)
EOSINOPHIL NFR BLD: 0.3 % (ref 0–8)
EOSINOPHIL NFR BLD: 0.6 % (ref 0–8)
EOSINOPHIL NFR BLD: 0.7 % (ref 0–8)
EOSINOPHIL NFR BLD: 0.8 % (ref 0–8)
EOSINOPHIL NFR BLD: 1.1 % (ref 0–8)
EOSINOPHIL NFR BLD: 1.1 % (ref 0–8)
EOSINOPHIL NFR BLD: 1.2 % (ref 0–8)
EOSINOPHIL NFR BLD: 1.3 % (ref 0–8)
EOSINOPHIL NFR BLD: 1.6 % (ref 0–8)
EOSINOPHIL NFR BLD: 1.7 % (ref 0–8)
EOSINOPHIL NFR BLD: 1.8 % (ref 0–8)
EOSINOPHIL NFR BLD: 1.9 % (ref 0–8)
EOSINOPHIL NFR BLD: 2.2 % (ref 0–8)
EOSINOPHIL NFR BLD: 2.2 % (ref 0–8)
EOSINOPHIL NFR BLD: 2.3 % (ref 0–8)
EOSINOPHIL NFR BLD: 2.4 % (ref 0–8)
EOSINOPHIL NFR BLD: 2.6 % (ref 0–8)
EOSINOPHIL NFR BLD: 2.9 % (ref 0–8)
ERYTHROCYTE [DISTWIDTH] IN BLOOD BY AUTOMATED COUNT: 13.2 % (ref 11.5–14.5)
ERYTHROCYTE [DISTWIDTH] IN BLOOD BY AUTOMATED COUNT: 13.3 % (ref 11.5–14.5)
ERYTHROCYTE [DISTWIDTH] IN BLOOD BY AUTOMATED COUNT: 13.4 % (ref 11.5–14.5)
ERYTHROCYTE [DISTWIDTH] IN BLOOD BY AUTOMATED COUNT: 13.5 % (ref 11.5–14.5)
ERYTHROCYTE [DISTWIDTH] IN BLOOD BY AUTOMATED COUNT: 14 % (ref 11.5–14.5)
ERYTHROCYTE [DISTWIDTH] IN BLOOD BY AUTOMATED COUNT: 14.1 % (ref 11.5–14.5)
ERYTHROCYTE [DISTWIDTH] IN BLOOD BY AUTOMATED COUNT: 14.2 % (ref 11.5–14.5)
ERYTHROCYTE [DISTWIDTH] IN BLOOD BY AUTOMATED COUNT: 14.2 % (ref 11.5–14.5)
ERYTHROCYTE [DISTWIDTH] IN BLOOD BY AUTOMATED COUNT: 14.3 % (ref 11.5–14.5)
ERYTHROCYTE [DISTWIDTH] IN BLOOD BY AUTOMATED COUNT: 14.5 % (ref 11.5–14.5)
ERYTHROCYTE [DISTWIDTH] IN BLOOD BY AUTOMATED COUNT: 14.5 % (ref 11.5–14.5)
ERYTHROCYTE [DISTWIDTH] IN BLOOD BY AUTOMATED COUNT: 14.6 % (ref 11.5–14.5)
ERYTHROCYTE [DISTWIDTH] IN BLOOD BY AUTOMATED COUNT: 14.6 % (ref 11.5–14.5)
ERYTHROCYTE [DISTWIDTH] IN BLOOD BY AUTOMATED COUNT: 15 % (ref 11.5–14.5)
ERYTHROCYTE [DISTWIDTH] IN BLOOD BY AUTOMATED COUNT: 15.3 % (ref 11.5–14.5)
ERYTHROCYTE [DISTWIDTH] IN BLOOD BY AUTOMATED COUNT: 15.4 % (ref 11.5–14.5)
ERYTHROCYTE [DISTWIDTH] IN BLOOD BY AUTOMATED COUNT: 15.4 % (ref 11.5–14.5)
EST. GFR  (AFRICAN AMERICAN): 59.8 ML/MIN/1.73 M^2
EST. GFR  (AFRICAN AMERICAN): >60 ML/MIN/1.73 M^2
EST. GFR  (NO RACE VARIABLE): 55.2 ML/MIN/1.73 M^2
EST. GFR  (NO RACE VARIABLE): 55.2 ML/MIN/1.73 M^2
EST. GFR  (NO RACE VARIABLE): >60 ML/MIN/1.73 M^2
EST. GFR  (NON AFRICAN AMERICAN): 51.9 ML/MIN/1.73 M^2
EST. GFR  (NON AFRICAN AMERICAN): 58.9 ML/MIN/1.73 M^2
EST. GFR  (NON AFRICAN AMERICAN): >60 ML/MIN/1.73 M^2
ESTIMATED AVG GLUCOSE: 154 MG/DL (ref 68–131)
FIO2: 21 %
FRACTIONAL SHORTENING: 35 % (ref 28–44)
GLUCOSE SERPL-MCNC: 176 MG/DL (ref 70–110)
GLUCOSE SERPL-MCNC: 201 MG/DL (ref 70–110)
GLUCOSE SERPL-MCNC: 208 MG/DL (ref 70–110)
GLUCOSE SERPL-MCNC: 216 MG/DL (ref 70–110)
GLUCOSE SERPL-MCNC: 218 MG/DL (ref 70–110)
GLUCOSE SERPL-MCNC: 219 MG/DL (ref 70–110)
GLUCOSE SERPL-MCNC: 228 MG/DL (ref 70–110)
GLUCOSE SERPL-MCNC: 240 MG/DL (ref 70–110)
GLUCOSE SERPL-MCNC: 242 MG/DL (ref 70–110)
GLUCOSE SERPL-MCNC: 254 MG/DL (ref 70–110)
GLUCOSE SERPL-MCNC: 264 MG/DL (ref 70–110)
GLUCOSE SERPL-MCNC: 268 MG/DL (ref 70–110)
GLUCOSE SERPL-MCNC: 274 MG/DL (ref 70–110)
GLUCOSE SERPL-MCNC: 278 MG/DL (ref 70–110)
GLUCOSE SERPL-MCNC: 290 MG/DL (ref 70–110)
GLUCOSE SERPL-MCNC: 292 MG/DL (ref 70–110)
GLUCOSE SERPL-MCNC: 303 MG/DL (ref 70–110)
GLUCOSE SERPL-MCNC: 308 MG/DL (ref 70–110)
GLUCOSE SERPL-MCNC: 317 MG/DL (ref 70–110)
GLUCOSE SERPL-MCNC: 322 MG/DL (ref 70–110)
GLUCOSE UR QL STRIP: NEGATIVE
HBA1C MFR BLD: 7 % (ref 4–5.6)
HCT VFR BLD AUTO: 21.6 % (ref 37–48.5)
HCT VFR BLD AUTO: 25.7 % (ref 37–48.5)
HCT VFR BLD AUTO: 25.9 % (ref 37–48.5)
HCT VFR BLD AUTO: 27.4 % (ref 37–48.5)
HCT VFR BLD AUTO: 27.5 % (ref 37–48.5)
HCT VFR BLD AUTO: 27.7 % (ref 37–48.5)
HCT VFR BLD AUTO: 27.7 % (ref 37–48.5)
HCT VFR BLD AUTO: 28.4 % (ref 37–48.5)
HCT VFR BLD AUTO: 29.4 % (ref 37–48.5)
HCT VFR BLD AUTO: 30.7 % (ref 37–48.5)
HCT VFR BLD AUTO: 31.1 % (ref 37–48.5)
HCT VFR BLD AUTO: 31.3 % (ref 37–48.5)
HCT VFR BLD AUTO: 31.3 % (ref 37–48.5)
HCT VFR BLD AUTO: 31.9 % (ref 37–48.5)
HCT VFR BLD AUTO: 32.3 % (ref 37–48.5)
HCT VFR BLD AUTO: 32.6 % (ref 37–48.5)
HCT VFR BLD AUTO: 32.9 % (ref 37–48.5)
HCT VFR BLD AUTO: 32.9 % (ref 37–48.5)
HCT VFR BLD AUTO: 33.7 % (ref 37–48.5)
HCT VFR BLD AUTO: 34 % (ref 37–48.5)
HCT VFR BLD AUTO: 34.3 % (ref 37–48.5)
HCT VFR BLD AUTO: 34.8 % (ref 37–48.5)
HCT VFR BLD AUTO: 35 % (ref 37–48.5)
HCT VFR BLD AUTO: 35.9 % (ref 37–48.5)
HCT VFR BLD AUTO: 37.7 % (ref 37–48.5)
HGB BLD-MCNC: 10 G/DL (ref 12–16)
HGB BLD-MCNC: 10.1 G/DL (ref 12–16)
HGB BLD-MCNC: 10.2 G/DL (ref 12–16)
HGB BLD-MCNC: 10.2 G/DL (ref 12–16)
HGB BLD-MCNC: 10.3 G/DL (ref 12–16)
HGB BLD-MCNC: 10.5 G/DL (ref 12–16)
HGB BLD-MCNC: 10.6 G/DL (ref 12–16)
HGB BLD-MCNC: 10.7 G/DL (ref 12–16)
HGB BLD-MCNC: 10.9 G/DL (ref 12–16)
HGB BLD-MCNC: 10.9 G/DL (ref 12–16)
HGB BLD-MCNC: 12 G/DL (ref 12–16)
HGB BLD-MCNC: 6.7 G/DL (ref 12–16)
HGB BLD-MCNC: 8.3 G/DL (ref 12–16)
HGB BLD-MCNC: 8.4 G/DL (ref 12–16)
HGB BLD-MCNC: 8.5 G/DL (ref 12–16)
HGB BLD-MCNC: 8.7 G/DL (ref 12–16)
HGB BLD-MCNC: 9 G/DL (ref 12–16)
HGB BLD-MCNC: 9 G/DL (ref 12–16)
HGB BLD-MCNC: 9.1 G/DL (ref 12–16)
HGB BLD-MCNC: 9.3 G/DL (ref 12–16)
HGB BLD-MCNC: 9.5 G/DL (ref 12–16)
HGB BLD-MCNC: 9.8 G/DL (ref 12–16)
HGB UR QL STRIP: ABNORMAL
HGB UR QL STRIP: ABNORMAL
HGB UR QL STRIP: NEGATIVE
HYALINE CASTS #/AREA URNS LPF: 16.5 /LPF
HYALINE CASTS #/AREA URNS LPF: 2.3 /LPF
HYALINE CASTS #/AREA URNS LPF: 31.8 /LPF
IMM GRANULOCYTES # BLD AUTO: 0.02 K/UL (ref 0–0.04)
IMM GRANULOCYTES # BLD AUTO: 0.03 K/UL (ref 0–0.04)
IMM GRANULOCYTES # BLD AUTO: 0.04 K/UL (ref 0–0.04)
IMM GRANULOCYTES # BLD AUTO: 0.05 K/UL (ref 0–0.04)
IMM GRANULOCYTES # BLD AUTO: 0.06 K/UL (ref 0–0.04)
IMM GRANULOCYTES # BLD AUTO: 0.07 K/UL (ref 0–0.04)
IMM GRANULOCYTES # BLD AUTO: 0.09 K/UL (ref 0–0.04)
IMM GRANULOCYTES # BLD AUTO: 0.1 K/UL (ref 0–0.04)
IMM GRANULOCYTES # BLD AUTO: 0.13 K/UL (ref 0–0.04)
IMM GRANULOCYTES NFR BLD AUTO: 0.3 % (ref 0–0.5)
IMM GRANULOCYTES NFR BLD AUTO: 0.4 % (ref 0–0.5)
IMM GRANULOCYTES NFR BLD AUTO: 0.4 % (ref 0–0.5)
IMM GRANULOCYTES NFR BLD AUTO: 0.5 % (ref 0–0.5)
IMM GRANULOCYTES NFR BLD AUTO: 0.6 % (ref 0–0.5)
IMM GRANULOCYTES NFR BLD AUTO: 0.6 % (ref 0–0.5)
IMM GRANULOCYTES NFR BLD AUTO: 0.7 % (ref 0–0.5)
IMM GRANULOCYTES NFR BLD AUTO: 0.8 % (ref 0–0.5)
IMM GRANULOCYTES NFR BLD AUTO: 0.9 % (ref 0–0.5)
IMM GRANULOCYTES NFR BLD AUTO: 1 % (ref 0–0.5)
IMM GRANULOCYTES NFR BLD AUTO: 1 % (ref 0–0.5)
IMM GRANULOCYTES NFR BLD AUTO: 1.1 % (ref 0–0.5)
IMM GRANULOCYTES NFR BLD AUTO: 1.2 % (ref 0–0.5)
IMM GRANULOCYTES NFR BLD AUTO: 1.3 % (ref 0–0.5)
INR PPP: 0.9 (ref 0.8–1.2)
INR PPP: 1 (ref 0.8–1.2)
INTERVENTRICULAR SEPTUM: 1.34 CM (ref 0.6–1.1)
KETONES UR QL STRIP: ABNORMAL
KETONES UR QL STRIP: ABNORMAL
KETONES UR QL STRIP: NEGATIVE
LACTATE SERPL-SCNC: 0.9 MMOL/L (ref 0.5–2.2)
LACTATE SERPL-SCNC: 1.1 MMOL/L (ref 0.5–2.2)
LEFT ATRIUM SIZE: 5 CM
LEFT INTERNAL DIMENSION IN SYSTOLE: 2.63 CM (ref 2.1–4)
LEFT VENTRICLE DIASTOLIC VOLUME INDEX: 36.96 ML/M2
LEFT VENTRICLE DIASTOLIC VOLUME: 71.34 ML
LEFT VENTRICLE MASS INDEX: 87 G/M2
LEFT VENTRICLE SYSTOLIC VOLUME INDEX: 13.1 ML/M2
LEFT VENTRICLE SYSTOLIC VOLUME: 25.22 ML
LEFT VENTRICULAR INTERNAL DIMENSION IN DIASTOLE: 4.03 CM (ref 3.5–6)
LEFT VENTRICULAR MASS: 167.33 G
LEUKOCYTE ESTERASE UR QL STRIP: ABNORMAL
LYMPHOCYTES # BLD AUTO: 0.7 K/UL (ref 1–4.8)
LYMPHOCYTES # BLD AUTO: 0.9 K/UL (ref 1–4.8)
LYMPHOCYTES # BLD AUTO: 1 K/UL (ref 1–4.8)
LYMPHOCYTES # BLD AUTO: 1 K/UL (ref 1–4.8)
LYMPHOCYTES # BLD AUTO: 1.1 K/UL (ref 1–4.8)
LYMPHOCYTES # BLD AUTO: 1.1 K/UL (ref 1–4.8)
LYMPHOCYTES # BLD AUTO: 1.2 K/UL (ref 1–4.8)
LYMPHOCYTES # BLD AUTO: 1.3 K/UL (ref 1–4.8)
LYMPHOCYTES # BLD AUTO: 1.4 K/UL (ref 1–4.8)
LYMPHOCYTES # BLD AUTO: 1.5 K/UL (ref 1–4.8)
LYMPHOCYTES NFR BLD: 11.7 % (ref 18–48)
LYMPHOCYTES NFR BLD: 12.5 % (ref 18–48)
LYMPHOCYTES NFR BLD: 13.4 % (ref 18–48)
LYMPHOCYTES NFR BLD: 15 % (ref 18–48)
LYMPHOCYTES NFR BLD: 16.2 % (ref 18–48)
LYMPHOCYTES NFR BLD: 17.5 % (ref 18–48)
LYMPHOCYTES NFR BLD: 17.6 % (ref 18–48)
LYMPHOCYTES NFR BLD: 18.2 % (ref 18–48)
LYMPHOCYTES NFR BLD: 18.9 % (ref 18–48)
LYMPHOCYTES NFR BLD: 19.4 % (ref 18–48)
LYMPHOCYTES NFR BLD: 19.4 % (ref 18–48)
LYMPHOCYTES NFR BLD: 19.9 % (ref 18–48)
LYMPHOCYTES NFR BLD: 21.1 % (ref 18–48)
LYMPHOCYTES NFR BLD: 21.3 % (ref 18–48)
LYMPHOCYTES NFR BLD: 22.2 % (ref 18–48)
LYMPHOCYTES NFR BLD: 23 % (ref 18–48)
LYMPHOCYTES NFR BLD: 23.4 % (ref 18–48)
LYMPHOCYTES NFR BLD: 24.9 % (ref 18–48)
LYMPHOCYTES NFR BLD: 25.1 % (ref 18–48)
LYMPHOCYTES NFR BLD: 25.2 % (ref 18–48)
LYMPHOCYTES NFR BLD: 25.2 % (ref 18–48)
LYMPHOCYTES NFR BLD: 26.7 % (ref 18–48)
LYMPHOCYTES NFR BLD: 27.6 % (ref 18–48)
LYMPHOCYTES NFR BLD: 7.9 % (ref 18–48)
LYMPHOCYTES NFR BLD: 7.9 % (ref 18–48)
LYMPHOCYTES NFR BLD: 8.7 % (ref 18–48)
LYMPHOCYTES NFR BLD: 9 % (ref 18–48)
MAGNESIUM SERPL-MCNC: 1.1 MG/DL (ref 1.6–2.6)
MAGNESIUM SERPL-MCNC: 1.4 MG/DL (ref 1.6–2.6)
MAGNESIUM SERPL-MCNC: 1.6 MG/DL (ref 1.6–2.6)
MAGNESIUM SERPL-MCNC: 1.6 MG/DL (ref 1.6–2.6)
MAGNESIUM SERPL-MCNC: 1.7 MG/DL (ref 1.6–2.6)
MCH RBC QN AUTO: 28.2 PG (ref 27–31)
MCH RBC QN AUTO: 28.3 PG (ref 27–31)
MCH RBC QN AUTO: 28.5 PG (ref 27–31)
MCH RBC QN AUTO: 28.7 PG (ref 27–31)
MCH RBC QN AUTO: 28.8 PG (ref 27–31)
MCH RBC QN AUTO: 29.1 PG (ref 27–31)
MCH RBC QN AUTO: 29.1 PG (ref 27–31)
MCH RBC QN AUTO: 29.2 PG (ref 27–31)
MCH RBC QN AUTO: 29.5 PG (ref 27–31)
MCH RBC QN AUTO: 29.5 PG (ref 27–31)
MCH RBC QN AUTO: 29.6 PG (ref 27–31)
MCH RBC QN AUTO: 29.9 PG (ref 27–31)
MCH RBC QN AUTO: 30 PG (ref 27–31)
MCHC RBC AUTO-ENTMCNC: 30.3 G/DL (ref 32–36)
MCHC RBC AUTO-ENTMCNC: 30.4 G/DL (ref 32–36)
MCHC RBC AUTO-ENTMCNC: 30.7 G/DL (ref 32–36)
MCHC RBC AUTO-ENTMCNC: 30.9 G/DL (ref 32–36)
MCHC RBC AUTO-ENTMCNC: 31 G/DL (ref 32–36)
MCHC RBC AUTO-ENTMCNC: 31 G/DL (ref 32–36)
MCHC RBC AUTO-ENTMCNC: 31.2 G/DL (ref 32–36)
MCHC RBC AUTO-ENTMCNC: 31.3 G/DL (ref 32–36)
MCHC RBC AUTO-ENTMCNC: 31.3 G/DL (ref 32–36)
MCHC RBC AUTO-ENTMCNC: 31.6 G/DL (ref 32–36)
MCHC RBC AUTO-ENTMCNC: 31.8 G/DL (ref 32–36)
MCHC RBC AUTO-ENTMCNC: 31.8 G/DL (ref 32–36)
MCHC RBC AUTO-ENTMCNC: 31.9 G/DL (ref 32–36)
MCHC RBC AUTO-ENTMCNC: 31.9 G/DL (ref 32–36)
MCHC RBC AUTO-ENTMCNC: 32 G/DL (ref 32–36)
MCHC RBC AUTO-ENTMCNC: 32 G/DL (ref 32–36)
MCHC RBC AUTO-ENTMCNC: 32.2 G/DL (ref 32–36)
MCHC RBC AUTO-ENTMCNC: 32.3 G/DL (ref 32–36)
MCHC RBC AUTO-ENTMCNC: 32.4 G/DL (ref 32–36)
MCHC RBC AUTO-ENTMCNC: 32.5 G/DL (ref 32–36)
MCV RBC AUTO: 89 FL (ref 82–98)
MCV RBC AUTO: 90 FL (ref 82–98)
MCV RBC AUTO: 91 FL (ref 82–98)
MCV RBC AUTO: 91 FL (ref 82–98)
MCV RBC AUTO: 92 FL (ref 82–98)
MCV RBC AUTO: 93 FL (ref 82–98)
MCV RBC AUTO: 94 FL (ref 82–98)
MCV RBC AUTO: 95 FL (ref 82–98)
MCV RBC AUTO: 95 FL (ref 82–98)
MCV RBC AUTO: 96 FL (ref 82–98)
MICROSCOPIC COMMENT: ABNORMAL
MODIFIED ALLEN'S TEST: ABNORMAL
MONOCYTES # BLD AUTO: 0.6 K/UL (ref 0.3–1)
MONOCYTES # BLD AUTO: 0.7 K/UL (ref 0.3–1)
MONOCYTES # BLD AUTO: 0.8 K/UL (ref 0.3–1)
MONOCYTES # BLD AUTO: 0.9 K/UL (ref 0.3–1)
MONOCYTES # BLD AUTO: 1 K/UL (ref 0.3–1)
MONOCYTES # BLD AUTO: 1.1 K/UL (ref 0.3–1)
MONOCYTES # BLD AUTO: 1.1 K/UL (ref 0.3–1)
MONOCYTES NFR BLD: 10.1 % (ref 4–15)
MONOCYTES NFR BLD: 10.2 % (ref 4–15)
MONOCYTES NFR BLD: 10.6 % (ref 4–15)
MONOCYTES NFR BLD: 10.6 % (ref 4–15)
MONOCYTES NFR BLD: 11.8 % (ref 4–15)
MONOCYTES NFR BLD: 12.4 % (ref 4–15)
MONOCYTES NFR BLD: 13.9 % (ref 4–15)
MONOCYTES NFR BLD: 14.6 % (ref 4–15)
MONOCYTES NFR BLD: 16.2 % (ref 4–15)
MONOCYTES NFR BLD: 16.3 % (ref 4–15)
MONOCYTES NFR BLD: 16.5 % (ref 4–15)
MONOCYTES NFR BLD: 17.5 % (ref 4–15)
MONOCYTES NFR BLD: 18 % (ref 4–15)
MONOCYTES NFR BLD: 7.6 % (ref 4–15)
MONOCYTES NFR BLD: 8.1 % (ref 4–15)
MONOCYTES NFR BLD: 8.4 % (ref 4–15)
MONOCYTES NFR BLD: 8.8 % (ref 4–15)
MONOCYTES NFR BLD: 8.8 % (ref 4–15)
MONOCYTES NFR BLD: 8.9 % (ref 4–15)
MONOCYTES NFR BLD: 9.1 % (ref 4–15)
MONOCYTES NFR BLD: 9.2 % (ref 4–15)
MONOCYTES NFR BLD: 9.3 % (ref 4–15)
MONOCYTES NFR BLD: 9.3 % (ref 4–15)
MONOCYTES NFR BLD: 9.5 % (ref 4–15)
MONOCYTES NFR BLD: 9.6 % (ref 4–15)
MONOCYTES NFR BLD: 9.6 % (ref 4–15)
MONOCYTES NFR BLD: 9.7 % (ref 4–15)
NEUTROPHILS # BLD AUTO: 10.4 K/UL (ref 1.8–7.7)
NEUTROPHILS # BLD AUTO: 2.4 K/UL (ref 1.8–7.7)
NEUTROPHILS # BLD AUTO: 2.5 K/UL (ref 1.8–7.7)
NEUTROPHILS # BLD AUTO: 2.6 K/UL (ref 1.8–7.7)
NEUTROPHILS # BLD AUTO: 2.9 K/UL (ref 1.8–7.7)
NEUTROPHILS # BLD AUTO: 3.1 K/UL (ref 1.8–7.7)
NEUTROPHILS # BLD AUTO: 3.2 K/UL (ref 1.8–7.7)
NEUTROPHILS # BLD AUTO: 3.3 K/UL (ref 1.8–7.7)
NEUTROPHILS # BLD AUTO: 3.3 K/UL (ref 1.8–7.7)
NEUTROPHILS # BLD AUTO: 3.7 K/UL (ref 1.8–7.7)
NEUTROPHILS # BLD AUTO: 3.7 K/UL (ref 1.8–7.7)
NEUTROPHILS # BLD AUTO: 4 K/UL (ref 1.8–7.7)
NEUTROPHILS # BLD AUTO: 4.2 K/UL (ref 1.8–7.7)
NEUTROPHILS # BLD AUTO: 4.2 K/UL (ref 1.8–7.7)
NEUTROPHILS # BLD AUTO: 4.3 K/UL (ref 1.8–7.7)
NEUTROPHILS # BLD AUTO: 4.5 K/UL (ref 1.8–7.7)
NEUTROPHILS # BLD AUTO: 4.5 K/UL (ref 1.8–7.7)
NEUTROPHILS # BLD AUTO: 5.3 K/UL (ref 1.8–7.7)
NEUTROPHILS # BLD AUTO: 5.5 K/UL (ref 1.8–7.7)
NEUTROPHILS # BLD AUTO: 5.5 K/UL (ref 1.8–7.7)
NEUTROPHILS # BLD AUTO: 5.6 K/UL (ref 1.8–7.7)
NEUTROPHILS # BLD AUTO: 5.8 K/UL (ref 1.8–7.7)
NEUTROPHILS # BLD AUTO: 7.1 K/UL (ref 1.8–7.7)
NEUTROPHILS # BLD AUTO: 7.6 K/UL (ref 1.8–7.7)
NEUTROPHILS # BLD AUTO: 8.1 K/UL (ref 1.8–7.7)
NEUTROPHILS # BLD AUTO: 8.7 K/UL (ref 1.8–7.7)
NEUTROPHILS # BLD AUTO: 9.4 K/UL (ref 1.8–7.7)
NEUTROPHILS NFR BLD: 51.3 % (ref 38–73)
NEUTROPHILS NFR BLD: 52.8 % (ref 38–73)
NEUTROPHILS NFR BLD: 54.1 % (ref 38–73)
NEUTROPHILS NFR BLD: 55.6 % (ref 38–73)
NEUTROPHILS NFR BLD: 57 % (ref 38–73)
NEUTROPHILS NFR BLD: 61.2 % (ref 38–73)
NEUTROPHILS NFR BLD: 61.2 % (ref 38–73)
NEUTROPHILS NFR BLD: 62 % (ref 38–73)
NEUTROPHILS NFR BLD: 63.2 % (ref 38–73)
NEUTROPHILS NFR BLD: 63.6 % (ref 38–73)
NEUTROPHILS NFR BLD: 64.7 % (ref 38–73)
NEUTROPHILS NFR BLD: 65.4 % (ref 38–73)
NEUTROPHILS NFR BLD: 66.6 % (ref 38–73)
NEUTROPHILS NFR BLD: 68.4 % (ref 38–73)
NEUTROPHILS NFR BLD: 68.8 % (ref 38–73)
NEUTROPHILS NFR BLD: 69.7 % (ref 38–73)
NEUTROPHILS NFR BLD: 69.8 % (ref 38–73)
NEUTROPHILS NFR BLD: 70.4 % (ref 38–73)
NEUTROPHILS NFR BLD: 72.1 % (ref 38–73)
NEUTROPHILS NFR BLD: 72.7 % (ref 38–73)
NEUTROPHILS NFR BLD: 72.9 % (ref 38–73)
NEUTROPHILS NFR BLD: 76.2 % (ref 38–73)
NEUTROPHILS NFR BLD: 78 % (ref 38–73)
NEUTROPHILS NFR BLD: 80.5 % (ref 38–73)
NEUTROPHILS NFR BLD: 80.6 % (ref 38–73)
NEUTROPHILS NFR BLD: 80.7 % (ref 38–73)
NEUTROPHILS NFR BLD: 83.2 % (ref 38–73)
NITRITE UR QL STRIP: NEGATIVE
NITRITE UR QL STRIP: NEGATIVE
NITRITE UR QL STRIP: POSITIVE
NRBC BLD-RTO: 0 /100 WBC
NT-PROBNP SERPL-MCNC: 8450 PG/ML (ref 5–1800)
NT-PROBNP SERPL-MCNC: 9324 PG/ML (ref 5–1800)
NUM UNITS TRANS PACKED RBC: NORMAL
NUM UNITS TRANS PACKED RBC: NORMAL
O2DEVICE: ABNORMAL
OB PNL STL: POSITIVE
PCO2 BLDA: 42 MMHG (ref 35–45)
PH SMN: 7.38 [PH] (ref 7.34–7.45)
PH UR STRIP: 5 [PH] (ref 5–8)
PH UR STRIP: 5 [PH] (ref 5–8)
PH UR STRIP: 6 [PH] (ref 5–8)
PISA TR MAX VEL: 2.33 M/S
PLATELET # BLD AUTO: 121 K/UL (ref 150–450)
PLATELET # BLD AUTO: 125 K/UL (ref 150–450)
PLATELET # BLD AUTO: 136 K/UL (ref 150–450)
PLATELET # BLD AUTO: 173 K/UL (ref 150–450)
PLATELET # BLD AUTO: 175 K/UL (ref 150–450)
PLATELET # BLD AUTO: 176 K/UL (ref 150–450)
PLATELET # BLD AUTO: 185 K/UL (ref 150–450)
PLATELET # BLD AUTO: 189 K/UL (ref 150–450)
PLATELET # BLD AUTO: 190 K/UL (ref 150–450)
PLATELET # BLD AUTO: 198 K/UL (ref 150–450)
PLATELET # BLD AUTO: 200 K/UL (ref 150–450)
PLATELET # BLD AUTO: 203 K/UL (ref 150–450)
PLATELET # BLD AUTO: 204 K/UL (ref 150–450)
PLATELET # BLD AUTO: 206 K/UL (ref 150–450)
PLATELET # BLD AUTO: 207 K/UL (ref 150–450)
PLATELET # BLD AUTO: 208 K/UL (ref 150–450)
PLATELET # BLD AUTO: 214 K/UL (ref 150–450)
PLATELET # BLD AUTO: 217 K/UL (ref 150–450)
PLATELET # BLD AUTO: 218 K/UL (ref 150–450)
PLATELET # BLD AUTO: 222 K/UL (ref 150–450)
PLATELET # BLD AUTO: 224 K/UL (ref 150–450)
PLATELET # BLD AUTO: 227 K/UL (ref 150–450)
PLATELET # BLD AUTO: 227 K/UL (ref 150–450)
PLATELET # BLD AUTO: 239 K/UL (ref 150–450)
PLATELET # BLD AUTO: 240 K/UL (ref 150–450)
PLATELET # BLD AUTO: 247 K/UL (ref 150–450)
PLATELET # BLD AUTO: 260 K/UL (ref 150–450)
PMV BLD AUTO: 11.4 FL (ref 9.2–12.9)
PMV BLD AUTO: 11.5 FL (ref 9.2–12.9)
PMV BLD AUTO: 11.6 FL (ref 9.2–12.9)
PMV BLD AUTO: 11.9 FL (ref 9.2–12.9)
PMV BLD AUTO: 12 FL (ref 9.2–12.9)
PMV BLD AUTO: 12.2 FL (ref 9.2–12.9)
PMV BLD AUTO: 12.4 FL (ref 9.2–12.9)
PMV BLD AUTO: 12.5 FL (ref 9.2–12.9)
PMV BLD AUTO: 12.7 FL (ref 9.2–12.9)
PMV BLD AUTO: 12.9 FL (ref 9.2–12.9)
PMV BLD AUTO: 13 FL (ref 9.2–12.9)
PMV BLD AUTO: 13 FL (ref 9.2–12.9)
PMV BLD AUTO: 13.1 FL (ref 9.2–12.9)
PMV BLD AUTO: 13.1 FL (ref 9.2–12.9)
PMV BLD AUTO: 13.2 FL (ref 9.2–12.9)
PO2 BLDA: 72.5 MMHG (ref 80–100)
POC BASE DEFICIT: 0.1 MMOL/L
POC HCO3: 24.4 MMOL/L
POC PERFORMED BY: ABNORMAL
POC SATURATED O2: 95.6 %
POC TCO2: 23.8 MMOL/L
POC TEMPERATURE: 37 C
POCT GLUCOSE: 172 MG/DL (ref 70–110)
POCT GLUCOSE: 173 MG/DL (ref 70–110)
POCT GLUCOSE: 184 MG/DL (ref 70–110)
POCT GLUCOSE: 202 MG/DL (ref 70–110)
POCT GLUCOSE: 213 MG/DL (ref 70–110)
POCT GLUCOSE: 235 MG/DL (ref 70–110)
POCT GLUCOSE: 237 MG/DL (ref 70–110)
POCT GLUCOSE: 250 MG/DL (ref 70–110)
POCT GLUCOSE: 254 MG/DL (ref 70–110)
POCT GLUCOSE: 255 MG/DL (ref 70–110)
POCT GLUCOSE: 262 MG/DL (ref 70–110)
POCT GLUCOSE: 262 MG/DL (ref 70–110)
POCT GLUCOSE: 263 MG/DL (ref 70–110)
POCT GLUCOSE: 267 MG/DL (ref 70–110)
POCT GLUCOSE: 269 MG/DL (ref 70–110)
POCT GLUCOSE: 271 MG/DL (ref 70–110)
POCT GLUCOSE: 272 MG/DL (ref 70–110)
POCT GLUCOSE: 278 MG/DL (ref 70–110)
POCT GLUCOSE: 281 MG/DL (ref 70–110)
POCT GLUCOSE: 286 MG/DL (ref 70–110)
POCT GLUCOSE: 311 MG/DL (ref 70–110)
POCT GLUCOSE: 381 MG/DL (ref 70–110)
POCT GLUCOSE: 383 MG/DL (ref 70–110)
POCT GLUCOSE: 416 MG/DL (ref 70–110)
POCT GLUCOSE: 434 MG/DL (ref 70–110)
POCT GLUCOSE: 468 MG/DL (ref 70–110)
POTASSIUM SERPL-SCNC: 3 MMOL/L (ref 3.5–5.1)
POTASSIUM SERPL-SCNC: 3.2 MMOL/L (ref 3.5–5.1)
POTASSIUM SERPL-SCNC: 3.5 MMOL/L (ref 3.5–5.1)
POTASSIUM SERPL-SCNC: 3.5 MMOL/L (ref 3.5–5.1)
POTASSIUM SERPL-SCNC: 3.6 MMOL/L (ref 3.5–5.1)
POTASSIUM SERPL-SCNC: 3.7 MMOL/L (ref 3.5–5.1)
POTASSIUM SERPL-SCNC: 3.8 MMOL/L (ref 3.5–5.1)
POTASSIUM SERPL-SCNC: 4.1 MMOL/L (ref 3.5–5.1)
POTASSIUM SERPL-SCNC: 4.1 MMOL/L (ref 3.5–5.1)
POTASSIUM SERPL-SCNC: 4.2 MMOL/L (ref 3.5–5.1)
POTASSIUM SERPL-SCNC: 4.2 MMOL/L (ref 3.5–5.1)
POTASSIUM SERPL-SCNC: 4.3 MMOL/L (ref 3.5–5.1)
POTASSIUM SERPL-SCNC: 4.4 MMOL/L (ref 3.5–5.1)
POTASSIUM SERPL-SCNC: 4.6 MMOL/L (ref 3.5–5.1)
POTASSIUM SERPL-SCNC: 4.6 MMOL/L (ref 3.5–5.1)
POTASSIUM SERPL-SCNC: 4.8 MMOL/L (ref 3.5–5.1)
POTASSIUM SERPL-SCNC: 5 MMOL/L (ref 3.5–5.1)
PROT SERPL-MCNC: 4.5 G/DL (ref 6–8.4)
PROT SERPL-MCNC: 4.7 G/DL (ref 6–8.4)
PROT SERPL-MCNC: 5 G/DL (ref 6–8.4)
PROT SERPL-MCNC: 5.1 G/DL (ref 6–8.4)
PROT SERPL-MCNC: 5.2 G/DL (ref 6–8.4)
PROT SERPL-MCNC: 5.3 G/DL (ref 6–8.4)
PROT SERPL-MCNC: 5.4 G/DL (ref 6–8.4)
PROT SERPL-MCNC: 5.5 G/DL (ref 6–8.4)
PROT SERPL-MCNC: 5.5 G/DL (ref 6–8.4)
PROT SERPL-MCNC: 5.8 G/DL (ref 6–8.4)
PROT SERPL-MCNC: 5.9 G/DL (ref 6–8.4)
PROT SERPL-MCNC: 5.9 G/DL (ref 6–8.4)
PROT SERPL-MCNC: 6 G/DL (ref 6–8.4)
PROT SERPL-MCNC: 6.8 G/DL (ref 6–8.4)
PROT UR QL STRIP: ABNORMAL
PROT UR QL STRIP: NEGATIVE
PROT UR QL STRIP: NEGATIVE
PROTHROMBIN TIME: 10.4 SEC (ref 9–12.5)
PROTHROMBIN TIME: 10.9 SEC (ref 9–12.5)
PV PEAK VELOCITY: 1.69 CM/S
RA WIDTH: 3.25 CM
RBC # BLD AUTO: 2.35 M/UL (ref 4–5.4)
RBC # BLD AUTO: 2.81 M/UL (ref 4–5.4)
RBC # BLD AUTO: 2.89 M/UL (ref 4–5.4)
RBC # BLD AUTO: 2.91 M/UL (ref 4–5.4)
RBC # BLD AUTO: 2.98 M/UL (ref 4–5.4)
RBC # BLD AUTO: 3.01 M/UL (ref 4–5.4)
RBC # BLD AUTO: 3.04 M/UL (ref 4–5.4)
RBC # BLD AUTO: 3.08 M/UL (ref 4–5.4)
RBC # BLD AUTO: 3.19 M/UL (ref 4–5.4)
RBC # BLD AUTO: 3.25 M/UL (ref 4–5.4)
RBC # BLD AUTO: 3.47 M/UL (ref 4–5.4)
RBC # BLD AUTO: 3.47 M/UL (ref 4–5.4)
RBC # BLD AUTO: 3.48 M/UL (ref 4–5.4)
RBC # BLD AUTO: 3.53 M/UL (ref 4–5.4)
RBC # BLD AUTO: 3.54 M/UL (ref 4–5.4)
RBC # BLD AUTO: 3.55 M/UL (ref 4–5.4)
RBC # BLD AUTO: 3.6 M/UL (ref 4–5.4)
RBC # BLD AUTO: 3.61 M/UL (ref 4–5.4)
RBC # BLD AUTO: 3.68 M/UL (ref 4–5.4)
RBC # BLD AUTO: 3.72 M/UL (ref 4–5.4)
RBC # BLD AUTO: 3.73 M/UL (ref 4–5.4)
RBC # BLD AUTO: 3.74 M/UL (ref 4–5.4)
RBC # BLD AUTO: 3.78 M/UL (ref 4–5.4)
RBC # BLD AUTO: 3.85 M/UL (ref 4–5.4)
RBC # BLD AUTO: 4.24 M/UL (ref 4–5.4)
RBC #/AREA URNS HPF: 1 /HPF (ref 0–4)
RBC #/AREA URNS HPF: 3 /HPF (ref 0–4)
RBC #/AREA URNS HPF: 4 /HPF (ref 0–4)
RIGHT VENTRICULAR END-DIASTOLIC DIMENSION: 2.41 CM
SARS-COV-2 RDRP RESP QL NAA+PROBE: NEGATIVE
SARS-COV-2 RDRP RESP QL NAA+PROBE: NEGATIVE
SARS-COV-2 RNA RESP QL NAA+PROBE: NOT DETECTED
SODIUM SERPL-SCNC: 141 MMOL/L (ref 136–145)
SODIUM SERPL-SCNC: 141 MMOL/L (ref 136–145)
SODIUM SERPL-SCNC: 142 MMOL/L (ref 136–145)
SODIUM SERPL-SCNC: 142 MMOL/L (ref 136–145)
SODIUM SERPL-SCNC: 143 MMOL/L (ref 136–145)
SODIUM SERPL-SCNC: 144 MMOL/L (ref 136–145)
SODIUM SERPL-SCNC: 145 MMOL/L (ref 136–145)
SODIUM SERPL-SCNC: 146 MMOL/L (ref 136–145)
SODIUM SERPL-SCNC: 146 MMOL/L (ref 136–145)
SODIUM SERPL-SCNC: 147 MMOL/L (ref 136–145)
SODIUM SERPL-SCNC: 148 MMOL/L (ref 136–145)
SODIUM SERPL-SCNC: 149 MMOL/L (ref 136–145)
SODIUM SERPL-SCNC: 149 MMOL/L (ref 136–145)
SODIUM SERPL-SCNC: 152 MMOL/L (ref 136–145)
SP GR UR STRIP: 1.02 (ref 1–1.03)
SPECIMEN SOURCE: ABNORMAL
SQUAMOUS #/AREA URNS HPF: 0 /HPF
SQUAMOUS #/AREA URNS HPF: 1 /HPF
SQUAMOUS #/AREA URNS HPF: 2 /HPF
TDI LATERAL: 0.06 M/S
TDI SEPTAL: 0.05 M/S
TDI: 0.06 M/S
TR MAX PG: 22 MMHG
TRICUSPID ANNULAR PLANE SYSTOLIC EXCURSION: 1.82 CM
TROPONIN I SERPL DL<=0.01 NG/ML-MCNC: 34 PG/ML (ref 0–60)
TROPONIN I SERPL DL<=0.01 NG/ML-MCNC: 71 PG/ML (ref 0–60)
TROPONIN I SERPL DL<=0.01 NG/ML-MCNC: 9.8 PG/ML (ref 0–60)
URN SPEC COLLECT METH UR: ABNORMAL
UROBILINOGEN UR STRIP-ACNC: 1 EU/DL
WBC # BLD AUTO: 10.01 K/UL (ref 3.9–12.7)
WBC # BLD AUTO: 10.1 K/UL (ref 3.9–12.7)
WBC # BLD AUTO: 10.85 K/UL (ref 3.9–12.7)
WBC # BLD AUTO: 11.3 K/UL (ref 3.9–12.7)
WBC # BLD AUTO: 12.83 K/UL (ref 3.9–12.7)
WBC # BLD AUTO: 4.61 K/UL (ref 3.9–12.7)
WBC # BLD AUTO: 4.74 K/UL (ref 3.9–12.7)
WBC # BLD AUTO: 4.92 K/UL (ref 3.9–12.7)
WBC # BLD AUTO: 4.94 K/UL (ref 3.9–12.7)
WBC # BLD AUTO: 5.03 K/UL (ref 3.9–12.7)
WBC # BLD AUTO: 5.25 K/UL (ref 3.9–12.7)
WBC # BLD AUTO: 5.29 K/UL (ref 3.9–12.7)
WBC # BLD AUTO: 5.35 K/UL (ref 3.9–12.7)
WBC # BLD AUTO: 6.03 K/UL (ref 3.9–12.7)
WBC # BLD AUTO: 6.03 K/UL (ref 3.9–12.7)
WBC # BLD AUTO: 6.12 K/UL (ref 3.9–12.7)
WBC # BLD AUTO: 6.14 K/UL (ref 3.9–12.7)
WBC # BLD AUTO: 6.34 K/UL (ref 3.9–12.7)
WBC # BLD AUTO: 6.43 K/UL (ref 3.9–12.7)
WBC # BLD AUTO: 6.58 K/UL (ref 3.9–12.7)
WBC # BLD AUTO: 6.81 K/UL (ref 3.9–12.7)
WBC # BLD AUTO: 7.54 K/UL (ref 3.9–12.7)
WBC # BLD AUTO: 7.59 K/UL (ref 3.9–12.7)
WBC # BLD AUTO: 7.73 K/UL (ref 3.9–12.7)
WBC # BLD AUTO: 7.91 K/UL (ref 3.9–12.7)
WBC # BLD AUTO: 8.02 K/UL (ref 3.9–12.7)
WBC # BLD AUTO: 9.08 K/UL (ref 3.9–12.7)
WBC #/AREA URNS HPF: 10 /HPF (ref 0–5)
WBC #/AREA URNS HPF: 82 /HPF (ref 0–5)
WBC #/AREA URNS HPF: >100 /HPF (ref 0–5)

## 2022-01-01 PROCEDURE — 94761 N-INVAS EAR/PLS OXIMETRY MLT: CPT

## 2022-01-01 PROCEDURE — 63600175 PHARM REV CODE 636 W HCPCS: Performed by: STUDENT IN AN ORGANIZED HEALTH CARE EDUCATION/TRAINING PROGRAM

## 2022-01-01 PROCEDURE — 80053 COMPREHEN METABOLIC PANEL: CPT | Performed by: STUDENT IN AN ORGANIZED HEALTH CARE EDUCATION/TRAINING PROGRAM

## 2022-01-01 PROCEDURE — 25000003 PHARM REV CODE 250: Performed by: INTERNAL MEDICINE

## 2022-01-01 PROCEDURE — 11000001 HC ACUTE MED/SURG PRIVATE ROOM

## 2022-01-01 PROCEDURE — 84484 ASSAY OF TROPONIN QUANT: CPT | Performed by: EMERGENCY MEDICINE

## 2022-01-01 PROCEDURE — 96374 THER/PROPH/DIAG INJ IV PUSH: CPT

## 2022-01-01 PROCEDURE — 99233 SBSQ HOSP IP/OBS HIGH 50: CPT | Mod: ,,, | Performed by: STUDENT IN AN ORGANIZED HEALTH CARE EDUCATION/TRAINING PROGRAM

## 2022-01-01 PROCEDURE — 99900031 HC PATIENT EDUCATION (STAT)

## 2022-01-01 PROCEDURE — 87088 URINE BACTERIA CULTURE: CPT | Performed by: STUDENT IN AN ORGANIZED HEALTH CARE EDUCATION/TRAINING PROGRAM

## 2022-01-01 PROCEDURE — 80053 COMPREHEN METABOLIC PANEL: CPT | Performed by: EMERGENCY MEDICINE

## 2022-01-01 PROCEDURE — 93010 EKG 12-LEAD: ICD-10-PCS | Mod: ,,, | Performed by: INTERNAL MEDICINE

## 2022-01-01 PROCEDURE — 36415 COLL VENOUS BLD VENIPUNCTURE: CPT | Performed by: NURSE PRACTITIONER

## 2022-01-01 PROCEDURE — 96375 TX/PRO/DX INJ NEW DRUG ADDON: CPT

## 2022-01-01 PROCEDURE — C9113 INJ PANTOPRAZOLE SODIUM, VIA: HCPCS | Performed by: STUDENT IN AN ORGANIZED HEALTH CARE EDUCATION/TRAINING PROGRAM

## 2022-01-01 PROCEDURE — 83735 ASSAY OF MAGNESIUM: CPT | Performed by: NURSE PRACTITIONER

## 2022-01-01 PROCEDURE — 99900035 HC TECH TIME PER 15 MIN (STAT)

## 2022-01-01 PROCEDURE — 99204 PR OFFICE/OUTPT VISIT, NEW, LEVL IV, 45-59 MIN: ICD-10-PCS | Mod: ,,, | Performed by: STUDENT IN AN ORGANIZED HEALTH CARE EDUCATION/TRAINING PROGRAM

## 2022-01-01 PROCEDURE — 36415 COLL VENOUS BLD VENIPUNCTURE: CPT | Performed by: INTERNAL MEDICINE

## 2022-01-01 PROCEDURE — C9399 UNCLASSIFIED DRUGS OR BIOLOG: HCPCS | Performed by: STUDENT IN AN ORGANIZED HEALTH CARE EDUCATION/TRAINING PROGRAM

## 2022-01-01 PROCEDURE — 25000003 PHARM REV CODE 250: Performed by: STUDENT IN AN ORGANIZED HEALTH CARE EDUCATION/TRAINING PROGRAM

## 2022-01-01 PROCEDURE — 86850 RBC ANTIBODY SCREEN: CPT | Performed by: NURSE PRACTITIONER

## 2022-01-01 PROCEDURE — 63600175 PHARM REV CODE 636 W HCPCS: Performed by: INTERNAL MEDICINE

## 2022-01-01 PROCEDURE — 99233 PR SUBSEQUENT HOSPITAL CARE,LEVL III: ICD-10-PCS | Mod: ,,, | Performed by: STUDENT IN AN ORGANIZED HEALTH CARE EDUCATION/TRAINING PROGRAM

## 2022-01-01 PROCEDURE — 85025 COMPLETE CBC W/AUTO DIFF WBC: CPT | Performed by: INTERNAL MEDICINE

## 2022-01-01 PROCEDURE — 27000221 HC OXYGEN, UP TO 24 HOURS

## 2022-01-01 PROCEDURE — 36415 COLL VENOUS BLD VENIPUNCTURE: CPT | Performed by: EMERGENCY MEDICINE

## 2022-01-01 PROCEDURE — 82803 BLOOD GASES ANY COMBINATION: CPT

## 2022-01-01 PROCEDURE — 84484 ASSAY OF TROPONIN QUANT: CPT | Performed by: INTERNAL MEDICINE

## 2022-01-01 PROCEDURE — 86920 COMPATIBILITY TEST SPIN: CPT | Performed by: NURSE PRACTITIONER

## 2022-01-01 PROCEDURE — 93010 ELECTROCARDIOGRAM REPORT: CPT | Mod: ,,, | Performed by: INTERNAL MEDICINE

## 2022-01-01 PROCEDURE — 85025 COMPLETE CBC W/AUTO DIFF WBC: CPT | Performed by: NURSE PRACTITIONER

## 2022-01-01 PROCEDURE — 83880 ASSAY OF NATRIURETIC PEPTIDE: CPT | Performed by: EMERGENCY MEDICINE

## 2022-01-01 PROCEDURE — 99285 EMERGENCY DEPT VISIT HI MDM: CPT | Mod: 25

## 2022-01-01 PROCEDURE — U0002 COVID-19 LAB TEST NON-CDC: HCPCS | Performed by: INTERNAL MEDICINE

## 2022-01-01 PROCEDURE — 81000 URINALYSIS NONAUTO W/SCOPE: CPT | Performed by: EMERGENCY MEDICINE

## 2022-01-01 PROCEDURE — 96365 THER/PROPH/DIAG IV INF INIT: CPT

## 2022-01-01 PROCEDURE — 93005 ELECTROCARDIOGRAM TRACING: CPT

## 2022-01-01 PROCEDURE — 85025 COMPLETE CBC W/AUTO DIFF WBC: CPT | Mod: 91 | Performed by: STUDENT IN AN ORGANIZED HEALTH CARE EDUCATION/TRAINING PROGRAM

## 2022-01-01 PROCEDURE — 85025 COMPLETE CBC W/AUTO DIFF WBC: CPT | Performed by: STUDENT IN AN ORGANIZED HEALTH CARE EDUCATION/TRAINING PROGRAM

## 2022-01-01 PROCEDURE — C9399 UNCLASSIFIED DRUGS OR BIOLOG: HCPCS | Performed by: INTERNAL MEDICINE

## 2022-01-01 PROCEDURE — C9113 INJ PANTOPRAZOLE SODIUM, VIA: HCPCS | Performed by: INTERNAL MEDICINE

## 2022-01-01 PROCEDURE — 85025 COMPLETE CBC W/AUTO DIFF WBC: CPT | Mod: 91 | Performed by: NURSE PRACTITIONER

## 2022-01-01 PROCEDURE — 80053 COMPREHEN METABOLIC PANEL: CPT | Performed by: INTERNAL MEDICINE

## 2022-01-01 PROCEDURE — 80053 COMPREHEN METABOLIC PANEL: CPT | Performed by: NURSE PRACTITIONER

## 2022-01-01 PROCEDURE — 94640 AIRWAY INHALATION TREATMENT: CPT

## 2022-01-01 PROCEDURE — 93321 DOPPLER ECHO F-UP/LMTD STD: CPT

## 2022-01-01 PROCEDURE — 99219 PR INITIAL OBSERVATION CARE,LEVL II: CPT | Mod: ,,, | Performed by: STUDENT IN AN ORGANIZED HEALTH CARE EDUCATION/TRAINING PROGRAM

## 2022-01-01 PROCEDURE — 36600 WITHDRAWAL OF ARTERIAL BLOOD: CPT

## 2022-01-01 PROCEDURE — 94660 CPAP INITIATION&MGMT: CPT

## 2022-01-01 PROCEDURE — 87077 CULTURE AEROBIC IDENTIFY: CPT | Performed by: STUDENT IN AN ORGANIZED HEALTH CARE EDUCATION/TRAINING PROGRAM

## 2022-01-01 PROCEDURE — 81000 URINALYSIS NONAUTO W/SCOPE: CPT | Performed by: STUDENT IN AN ORGANIZED HEALTH CARE EDUCATION/TRAINING PROGRAM

## 2022-01-01 PROCEDURE — 83036 HEMOGLOBIN GLYCOSYLATED A1C: CPT | Performed by: NURSE PRACTITIONER

## 2022-01-01 PROCEDURE — A4216 STERILE WATER/SALINE, 10 ML: HCPCS | Performed by: STUDENT IN AN ORGANIZED HEALTH CARE EDUCATION/TRAINING PROGRAM

## 2022-01-01 PROCEDURE — 63600175 PHARM REV CODE 636 W HCPCS: Performed by: EMERGENCY MEDICINE

## 2022-01-01 PROCEDURE — G0378 HOSPITAL OBSERVATION PER HR: HCPCS

## 2022-01-01 PROCEDURE — 97530 THERAPEUTIC ACTIVITIES: CPT

## 2022-01-01 PROCEDURE — 99285 EMERGENCY DEPT VISIT HI MDM: CPT

## 2022-01-01 PROCEDURE — 94799 UNLISTED PULMONARY SVC/PX: CPT

## 2022-01-01 PROCEDURE — 25000003 PHARM REV CODE 250: Performed by: NURSE PRACTITIONER

## 2022-01-01 PROCEDURE — 87077 CULTURE AEROBIC IDENTIFY: CPT | Performed by: EMERGENCY MEDICINE

## 2022-01-01 PROCEDURE — U0002 COVID-19 LAB TEST NON-CDC: HCPCS | Performed by: EMERGENCY MEDICINE

## 2022-01-01 PROCEDURE — 25000242 PHARM REV CODE 250 ALT 637 W/ HCPCS: Performed by: EMERGENCY MEDICINE

## 2022-01-01 PROCEDURE — 97166 OT EVAL MOD COMPLEX 45 MIN: CPT

## 2022-01-01 PROCEDURE — 83735 ASSAY OF MAGNESIUM: CPT | Performed by: EMERGENCY MEDICINE

## 2022-01-01 PROCEDURE — 86901 BLOOD TYPING SEROLOGIC RH(D): CPT | Performed by: STUDENT IN AN ORGANIZED HEALTH CARE EDUCATION/TRAINING PROGRAM

## 2022-01-01 PROCEDURE — 87086 URINE CULTURE/COLONY COUNT: CPT | Performed by: EMERGENCY MEDICINE

## 2022-01-01 PROCEDURE — 25000242 PHARM REV CODE 250 ALT 637 W/ HCPCS: Performed by: INTERNAL MEDICINE

## 2022-01-01 PROCEDURE — 85610 PROTHROMBIN TIME: CPT | Performed by: STUDENT IN AN ORGANIZED HEALTH CARE EDUCATION/TRAINING PROGRAM

## 2022-01-01 PROCEDURE — 36415 COLL VENOUS BLD VENIPUNCTURE: CPT | Performed by: STUDENT IN AN ORGANIZED HEALTH CARE EDUCATION/TRAINING PROGRAM

## 2022-01-01 PROCEDURE — 36430 TRANSFUSION BLD/BLD COMPNT: CPT

## 2022-01-01 PROCEDURE — 83605 ASSAY OF LACTIC ACID: CPT | Performed by: EMERGENCY MEDICINE

## 2022-01-01 PROCEDURE — 87186 SC STD MICRODIL/AGAR DIL: CPT | Performed by: EMERGENCY MEDICINE

## 2022-01-01 PROCEDURE — 99282 EMERGENCY DEPT VISIT SF MDM: CPT

## 2022-01-01 PROCEDURE — 82272 OCCULT BLD FECES 1-3 TESTS: CPT | Performed by: STUDENT IN AN ORGANIZED HEALTH CARE EDUCATION/TRAINING PROGRAM

## 2022-01-01 PROCEDURE — 63600175 PHARM REV CODE 636 W HCPCS: Performed by: NURSE PRACTITIONER

## 2022-01-01 PROCEDURE — 87086 URINE CULTURE/COLONY COUNT: CPT | Performed by: STUDENT IN AN ORGANIZED HEALTH CARE EDUCATION/TRAINING PROGRAM

## 2022-01-01 PROCEDURE — 93308 TTE F-UP OR LMTD: CPT

## 2022-01-01 PROCEDURE — 85025 COMPLETE CBC W/AUTO DIFF WBC: CPT | Performed by: EMERGENCY MEDICINE

## 2022-01-01 PROCEDURE — 99204 OFFICE O/P NEW MOD 45 MIN: CPT | Mod: ,,, | Performed by: STUDENT IN AN ORGANIZED HEALTH CARE EDUCATION/TRAINING PROGRAM

## 2022-01-01 PROCEDURE — 87186 SC STD MICRODIL/AGAR DIL: CPT | Performed by: STUDENT IN AN ORGANIZED HEALTH CARE EDUCATION/TRAINING PROGRAM

## 2022-01-01 PROCEDURE — 85610 PROTHROMBIN TIME: CPT | Performed by: EMERGENCY MEDICINE

## 2022-01-01 PROCEDURE — 97162 PT EVAL MOD COMPLEX 30 MIN: CPT

## 2022-01-01 PROCEDURE — 85730 THROMBOPLASTIN TIME PARTIAL: CPT | Performed by: STUDENT IN AN ORGANIZED HEALTH CARE EDUCATION/TRAINING PROGRAM

## 2022-01-01 PROCEDURE — 99219 PR INITIAL OBSERVATION CARE,LEVL II: ICD-10-PCS | Mod: ,,, | Performed by: STUDENT IN AN ORGANIZED HEALTH CARE EDUCATION/TRAINING PROGRAM

## 2022-01-01 PROCEDURE — P9016 RBC LEUKOCYTES REDUCED: HCPCS | Performed by: NURSE PRACTITIONER

## 2022-01-01 PROCEDURE — 87088 URINE BACTERIA CULTURE: CPT | Performed by: EMERGENCY MEDICINE

## 2022-01-01 RX ORDER — VENLAFAXINE HYDROCHLORIDE 75 MG/1
75 CAPSULE, EXTENDED RELEASE ORAL DAILY
Status: DISCONTINUED | OUTPATIENT
Start: 2022-01-01 | End: 2022-01-01 | Stop reason: HOSPADM

## 2022-01-01 RX ORDER — PRAVASTATIN SODIUM 20 MG/1
20 TABLET ORAL NIGHTLY
Status: DISCONTINUED | OUTPATIENT
Start: 2022-01-01 | End: 2022-01-01 | Stop reason: HOSPADM

## 2022-01-01 RX ORDER — IBUPROFEN 200 MG
16 TABLET ORAL
Status: DISCONTINUED | OUTPATIENT
Start: 2022-01-01 | End: 2022-01-01 | Stop reason: HOSPADM

## 2022-01-01 RX ORDER — HYDROCODONE BITARTRATE AND ACETAMINOPHEN 500; 5 MG/1; MG/1
TABLET ORAL
Status: DISCONTINUED | OUTPATIENT
Start: 2022-01-01 | End: 2022-01-01 | Stop reason: HOSPADM

## 2022-01-01 RX ORDER — SODIUM CHLORIDE 0.9 % (FLUSH) 0.9 %
10 SYRINGE (ML) INJECTION
Status: DISCONTINUED | OUTPATIENT
Start: 2022-01-01 | End: 2022-01-01 | Stop reason: HOSPADM

## 2022-01-01 RX ORDER — ASPIRIN 81 MG/1
81 TABLET ORAL DAILY
Status: DISCONTINUED | OUTPATIENT
Start: 2022-01-01 | End: 2022-01-01 | Stop reason: HOSPADM

## 2022-01-01 RX ORDER — PANTOPRAZOLE SODIUM 40 MG/1
40 TABLET, DELAYED RELEASE ORAL DAILY
Status: DISCONTINUED | OUTPATIENT
Start: 2022-01-01 | End: 2022-01-01 | Stop reason: HOSPADM

## 2022-01-01 RX ORDER — FUROSEMIDE 10 MG/ML
80 INJECTION INTRAMUSCULAR; INTRAVENOUS
Status: COMPLETED | OUTPATIENT
Start: 2022-01-01 | End: 2022-01-01

## 2022-01-01 RX ORDER — AMLODIPINE BESYLATE 5 MG/1
5 TABLET ORAL DAILY
Status: DISCONTINUED | OUTPATIENT
Start: 2022-01-01 | End: 2022-01-01 | Stop reason: HOSPADM

## 2022-01-01 RX ORDER — FAMOTIDINE 10 MG/ML
20 INJECTION INTRAVENOUS 2 TIMES DAILY
Status: DISCONTINUED | OUTPATIENT
Start: 2022-01-01 | End: 2022-01-01

## 2022-01-01 RX ORDER — POTASSIUM CHLORIDE 20 MEQ/1
40 TABLET, EXTENDED RELEASE ORAL DAILY
Qty: 6 TABLET | Refills: 0
Start: 2022-01-01 | End: 2022-01-01

## 2022-01-01 RX ORDER — AMLODIPINE BESYLATE 5 MG/1
5 TABLET ORAL DAILY
Qty: 30 TABLET | Refills: 11 | Status: SHIPPED | OUTPATIENT
Start: 2022-01-01 | End: 2023-06-20

## 2022-01-01 RX ORDER — VENLAFAXINE HYDROCHLORIDE 75 MG/1
75 CAPSULE, EXTENDED RELEASE ORAL DAILY
COMMUNITY
End: 2022-01-01

## 2022-01-01 RX ORDER — LEVOTHYROXINE SODIUM 150 UG/1
150 TABLET ORAL
Status: DISCONTINUED | OUTPATIENT
Start: 2022-01-01 | End: 2022-01-01 | Stop reason: HOSPADM

## 2022-01-01 RX ORDER — LEVOTHYROXINE SODIUM 125 UG/1
125 TABLET ORAL
Status: DISCONTINUED | OUTPATIENT
Start: 2022-01-01 | End: 2022-01-01 | Stop reason: HOSPADM

## 2022-01-01 RX ORDER — ACETAMINOPHEN 325 MG/1
650 TABLET ORAL EVERY 6 HOURS PRN
Status: DISCONTINUED | OUTPATIENT
Start: 2022-01-01 | End: 2022-01-01 | Stop reason: HOSPADM

## 2022-01-01 RX ORDER — CEFUROXIME AXETIL 250 MG/1
250 TABLET ORAL 2 TIMES DAILY
COMMUNITY
End: 2022-01-01 | Stop reason: SDUPTHER

## 2022-01-01 RX ORDER — CEPHALEXIN 500 MG/1
500 CAPSULE ORAL 4 TIMES DAILY
Qty: 20 CAPSULE | Refills: 0 | Status: SHIPPED | OUTPATIENT
Start: 2022-01-01 | End: 2022-01-01

## 2022-01-01 RX ORDER — FUROSEMIDE 40 MG/1
40 TABLET ORAL 2 TIMES DAILY
Start: 2022-01-01 | End: 2023-08-01

## 2022-01-01 RX ORDER — FUROSEMIDE 40 MG/1
40 TABLET ORAL 2 TIMES DAILY
Status: DISCONTINUED | OUTPATIENT
Start: 2022-01-01 | End: 2022-01-01 | Stop reason: HOSPADM

## 2022-01-01 RX ORDER — IPRATROPIUM BROMIDE AND ALBUTEROL SULFATE 2.5; .5 MG/3ML; MG/3ML
3 SOLUTION RESPIRATORY (INHALATION) EVERY 6 HOURS PRN
Status: DISCONTINUED | OUTPATIENT
Start: 2022-01-01 | End: 2022-01-01 | Stop reason: HOSPADM

## 2022-01-01 RX ORDER — IPRATROPIUM BROMIDE AND ALBUTEROL SULFATE 2.5; .5 MG/3ML; MG/3ML
3 SOLUTION RESPIRATORY (INHALATION) EVERY 6 HOURS PRN
Status: ON HOLD | COMMUNITY
End: 2022-01-01 | Stop reason: HOSPADM

## 2022-01-01 RX ORDER — GABAPENTIN 100 MG/1
100 CAPSULE ORAL NIGHTLY
Status: DISCONTINUED | OUTPATIENT
Start: 2022-01-01 | End: 2022-01-01 | Stop reason: HOSPADM

## 2022-01-01 RX ORDER — FLUTICASONE PROPIONATE 50 MCG
1 SPRAY, SUSPENSION (ML) NASAL DAILY
Status: DISCONTINUED | OUTPATIENT
Start: 2022-01-01 | End: 2022-01-01 | Stop reason: HOSPADM

## 2022-01-01 RX ORDER — ISOSORBIDE MONONITRATE 30 MG/1
30 TABLET, EXTENDED RELEASE ORAL DAILY
Status: DISCONTINUED | OUTPATIENT
Start: 2022-01-01 | End: 2022-01-01 | Stop reason: HOSPADM

## 2022-01-01 RX ORDER — GLUCAGON 1 MG
1 KIT INJECTION
Status: DISCONTINUED | OUTPATIENT
Start: 2022-01-01 | End: 2022-01-01 | Stop reason: HOSPADM

## 2022-01-01 RX ORDER — BUSPIRONE HYDROCHLORIDE 5 MG/1
10 TABLET ORAL 3 TIMES DAILY PRN
Status: DISCONTINUED | OUTPATIENT
Start: 2022-01-01 | End: 2022-01-01 | Stop reason: HOSPADM

## 2022-01-01 RX ORDER — LEVOFLOXACIN 5 MG/ML
500 INJECTION, SOLUTION INTRAVENOUS
Status: DISCONTINUED | OUTPATIENT
Start: 2022-01-01 | End: 2022-01-01

## 2022-01-01 RX ORDER — POTASSIUM CHLORIDE 20 MEQ/1
40 TABLET, EXTENDED RELEASE ORAL DAILY
Status: DISCONTINUED | OUTPATIENT
Start: 2022-01-01 | End: 2022-01-01

## 2022-01-01 RX ORDER — CARVEDILOL 3.12 MG/1
3.12 TABLET ORAL 2 TIMES DAILY
Start: 2022-01-01 | End: 2023-08-01

## 2022-01-01 RX ORDER — INSULIN ASPART 100 [IU]/ML
1-10 INJECTION, SOLUTION INTRAVENOUS; SUBCUTANEOUS
Refills: 0
Start: 2022-01-01 | End: 2023-06-20

## 2022-01-01 RX ORDER — IBUPROFEN 400 MG/1
400 TABLET ORAL EVERY 8 HOURS PRN
Status: ON HOLD | COMMUNITY
End: 2022-01-01 | Stop reason: HOSPADM

## 2022-01-01 RX ORDER — SODIUM CHLORIDE, SODIUM LACTATE, POTASSIUM CHLORIDE, CALCIUM CHLORIDE 600; 310; 30; 20 MG/100ML; MG/100ML; MG/100ML; MG/100ML
INJECTION, SOLUTION INTRAVENOUS CONTINUOUS
Status: DISCONTINUED | OUTPATIENT
Start: 2022-01-01 | End: 2022-01-01

## 2022-01-01 RX ORDER — FAMOTIDINE 20 MG/1
20 TABLET, FILM COATED ORAL 2 TIMES DAILY
Status: DISCONTINUED | OUTPATIENT
Start: 2022-01-01 | End: 2022-01-01 | Stop reason: HOSPADM

## 2022-01-01 RX ORDER — NITROGLYCERIN 80 MG/1
1 PATCH TRANSDERMAL DAILY
Status: DISCONTINUED | OUTPATIENT
Start: 2022-01-01 | End: 2022-01-01

## 2022-01-01 RX ORDER — IBUPROFEN 200 MG
24 TABLET ORAL
Status: DISCONTINUED | OUTPATIENT
Start: 2022-01-01 | End: 2022-01-01 | Stop reason: HOSPADM

## 2022-01-01 RX ORDER — INSULIN ASPART 100 [IU]/ML
1-10 INJECTION, SOLUTION INTRAVENOUS; SUBCUTANEOUS
Status: DISCONTINUED | OUTPATIENT
Start: 2022-01-01 | End: 2022-01-01 | Stop reason: HOSPADM

## 2022-01-01 RX ORDER — LEVOFLOXACIN 500 MG/1
500 TABLET, FILM COATED ORAL DAILY
Status: DISCONTINUED | OUTPATIENT
Start: 2022-01-01 | End: 2022-01-01 | Stop reason: HOSPADM

## 2022-01-01 RX ORDER — ENOXAPARIN SODIUM 100 MG/ML
40 INJECTION SUBCUTANEOUS EVERY 24 HOURS
Status: DISCONTINUED | OUTPATIENT
Start: 2022-01-01 | End: 2022-01-01 | Stop reason: HOSPADM

## 2022-01-01 RX ORDER — GABAPENTIN 100 MG/1
100 CAPSULE ORAL NIGHTLY
Status: DISCONTINUED | OUTPATIENT
Start: 2022-01-01 | End: 2022-01-01

## 2022-01-01 RX ORDER — SODIUM, POTASSIUM,MAG SULFATES 17.5-3.13G
SOLUTION, RECONSTITUTED, ORAL ORAL 2 TIMES DAILY
Status: DISCONTINUED | OUTPATIENT
Start: 2022-01-01 | End: 2022-01-01

## 2022-01-01 RX ORDER — LEVOTHYROXINE SODIUM 125 UG/1
125 TABLET ORAL
Status: DISCONTINUED | OUTPATIENT
Start: 2022-01-01 | End: 2022-01-01

## 2022-01-01 RX ORDER — CEFDINIR 300 MG/1
300 CAPSULE ORAL 2 TIMES DAILY
Qty: 20 CAPSULE | Refills: 0 | Status: SHIPPED | OUTPATIENT
Start: 2022-01-01 | End: 2022-01-01 | Stop reason: HOSPADM

## 2022-01-01 RX ORDER — BACLOFEN 10 MG/1
10 TABLET ORAL 3 TIMES DAILY
Status: DISCONTINUED | OUTPATIENT
Start: 2022-01-01 | End: 2022-01-01

## 2022-01-01 RX ORDER — PANTOPRAZOLE SODIUM 40 MG/10ML
40 INJECTION, POWDER, LYOPHILIZED, FOR SOLUTION INTRAVENOUS DAILY
Status: DISCONTINUED | OUTPATIENT
Start: 2022-01-01 | End: 2022-01-01

## 2022-01-01 RX ORDER — FUROSEMIDE 10 MG/ML
40 INJECTION INTRAMUSCULAR; INTRAVENOUS
Status: COMPLETED | OUTPATIENT
Start: 2022-01-01 | End: 2022-01-01

## 2022-01-01 RX ORDER — MAGNESIUM SULFATE HEPTAHYDRATE 40 MG/ML
2 INJECTION, SOLUTION INTRAVENOUS ONCE
Status: COMPLETED | OUTPATIENT
Start: 2022-01-01 | End: 2022-01-01

## 2022-01-01 RX ORDER — ATORVASTATIN CALCIUM 20 MG/1
20 TABLET, FILM COATED ORAL DAILY
Status: DISCONTINUED | OUTPATIENT
Start: 2022-01-01 | End: 2022-01-01 | Stop reason: HOSPADM

## 2022-01-01 RX ORDER — PRAMIPEXOLE DIHYDROCHLORIDE 0.5 MG/1
1 TABLET ORAL 3 TIMES DAILY
Status: DISCONTINUED | OUTPATIENT
Start: 2022-01-01 | End: 2022-01-01

## 2022-01-01 RX ORDER — IPRATROPIUM BROMIDE AND ALBUTEROL SULFATE 2.5; .5 MG/3ML; MG/3ML
3 SOLUTION RESPIRATORY (INHALATION)
Status: COMPLETED | OUTPATIENT
Start: 2022-01-01 | End: 2022-01-01

## 2022-01-01 RX ORDER — FUROSEMIDE 20 MG/1
20 TABLET ORAL DAILY
Qty: 30 TABLET | Refills: 0 | Status: SHIPPED | OUTPATIENT
Start: 2022-01-01 | End: 2022-01-01 | Stop reason: HOSPADM

## 2022-01-01 RX ORDER — ONDANSETRON 2 MG/ML
4 INJECTION INTRAMUSCULAR; INTRAVENOUS EVERY 8 HOURS PRN
Status: DISCONTINUED | OUTPATIENT
Start: 2022-01-01 | End: 2022-01-01 | Stop reason: HOSPADM

## 2022-01-01 RX ORDER — BUSPIRONE HYDROCHLORIDE 10 MG/1
10 TABLET ORAL 3 TIMES DAILY PRN
Start: 2022-01-01 | End: 2023-06-20

## 2022-01-01 RX ORDER — FENTANYL 50 UG/1
1 PATCH TRANSDERMAL
Status: DISCONTINUED | OUTPATIENT
Start: 2022-01-01 | End: 2022-01-01

## 2022-01-01 RX ORDER — TALC
6 POWDER (GRAM) TOPICAL NIGHTLY PRN
Status: DISCONTINUED | OUTPATIENT
Start: 2022-01-01 | End: 2022-01-01 | Stop reason: HOSPADM

## 2022-01-01 RX ORDER — SERTRALINE HYDROCHLORIDE 50 MG/1
50 TABLET, FILM COATED ORAL DAILY
Status: DISCONTINUED | OUTPATIENT
Start: 2022-01-01 | End: 2022-01-01

## 2022-01-01 RX ORDER — CLOPIDOGREL BISULFATE 75 MG/1
75 TABLET ORAL DAILY
Status: DISCONTINUED | OUTPATIENT
Start: 2022-01-01 | End: 2022-01-01

## 2022-01-01 RX ORDER — PRAVASTATIN SODIUM 20 MG/1
20 TABLET ORAL NIGHTLY
Start: 2022-01-01 | End: 2022-01-01 | Stop reason: HOSPADM

## 2022-01-01 RX ORDER — POTASSIUM CHLORIDE 20 MEQ/1
40 TABLET, EXTENDED RELEASE ORAL 2 TIMES DAILY
Status: DISCONTINUED | OUTPATIENT
Start: 2022-01-01 | End: 2022-01-01 | Stop reason: HOSPADM

## 2022-01-01 RX ORDER — ACETAMINOPHEN 325 MG/1
650 TABLET ORAL EVERY 8 HOURS PRN
Status: DISCONTINUED | OUTPATIENT
Start: 2022-01-01 | End: 2022-01-01 | Stop reason: HOSPADM

## 2022-01-01 RX ORDER — CYANOCOBALAMIN 1000 UG/ML
1000 INJECTION, SOLUTION INTRAMUSCULAR; SUBCUTANEOUS
Status: ON HOLD | COMMUNITY
End: 2022-01-01 | Stop reason: HOSPADM

## 2022-01-01 RX ORDER — FUROSEMIDE 10 MG/ML
40 INJECTION INTRAMUSCULAR; INTRAVENOUS
Status: DISCONTINUED | OUTPATIENT
Start: 2022-01-01 | End: 2022-01-01

## 2022-01-01 RX ORDER — CARVEDILOL 3.12 MG/1
3.12 TABLET ORAL 2 TIMES DAILY
Status: DISCONTINUED | OUTPATIENT
Start: 2022-01-01 | End: 2022-01-01 | Stop reason: HOSPADM

## 2022-01-01 RX ORDER — SODIUM CHLORIDE 450 MG/100ML
INJECTION, SOLUTION INTRAVENOUS CONTINUOUS
Status: DISCONTINUED | OUTPATIENT
Start: 2022-01-01 | End: 2022-01-01 | Stop reason: HOSPADM

## 2022-01-01 RX ORDER — FLUCONAZOLE 200 MG/1
100 TABLET ORAL DAILY
Status: ON HOLD | COMMUNITY
End: 2022-01-01 | Stop reason: HOSPADM

## 2022-01-01 RX ORDER — GABAPENTIN 100 MG/1
100 CAPSULE ORAL NIGHTLY
COMMUNITY

## 2022-01-01 RX ORDER — PANTOPRAZOLE SODIUM 40 MG/10ML
40 INJECTION, POWDER, LYOPHILIZED, FOR SOLUTION INTRAVENOUS
Status: COMPLETED | OUTPATIENT
Start: 2022-01-01 | End: 2022-01-01

## 2022-01-01 RX ORDER — FAMOTIDINE 10 MG/ML
20 INJECTION INTRAVENOUS DAILY
Status: DISCONTINUED | OUTPATIENT
Start: 2022-01-01 | End: 2022-01-01

## 2022-01-01 RX ORDER — DONEPEZIL HYDROCHLORIDE 5 MG/1
10 TABLET, FILM COATED ORAL NIGHTLY
Status: DISCONTINUED | OUTPATIENT
Start: 2022-01-01 | End: 2022-01-01 | Stop reason: HOSPADM

## 2022-01-01 RX ORDER — NITROFURANTOIN 25; 75 MG/1; MG/1
100 CAPSULE ORAL EVERY 12 HOURS
Status: DISCONTINUED | OUTPATIENT
Start: 2022-01-01 | End: 2022-01-01

## 2022-01-01 RX ORDER — LEVOFLOXACIN 500 MG/1
500 TABLET, FILM COATED ORAL DAILY
Qty: 7 TABLET | Refills: 0
Start: 2022-01-01 | End: 2022-01-01

## 2022-01-01 RX ADMIN — IPRATROPIUM BROMIDE AND ALBUTEROL SULFATE 3 ML: .5; 3 SOLUTION RESPIRATORY (INHALATION) at 01:08

## 2022-01-01 RX ADMIN — LEVOTHYROXINE SODIUM 125 MCG: 125 TABLET ORAL at 06:06

## 2022-01-01 RX ADMIN — PRAMIPEXOLE DIHYDROCHLORIDE 1 MG: 0.5 TABLET ORAL at 03:06

## 2022-01-01 RX ADMIN — INSULIN DETEMIR 10 UNITS: 100 INJECTION, SOLUTION SUBCUTANEOUS at 08:07

## 2022-01-01 RX ADMIN — PRAVASTATIN SODIUM 20 MG: 20 TABLET ORAL at 09:07

## 2022-01-01 RX ADMIN — CEFTRIAXONE 1 G: 1 INJECTION, SOLUTION INTRAVENOUS at 12:01

## 2022-01-01 RX ADMIN — ASPIRIN 81 MG: 81 TABLET, COATED ORAL at 09:07

## 2022-01-01 RX ADMIN — FAMOTIDINE 20 MG: 10 INJECTION INTRAVENOUS at 10:06

## 2022-01-01 RX ADMIN — ENOXAPARIN SODIUM 40 MG: 40 INJECTION SUBCUTANEOUS at 05:07

## 2022-01-01 RX ADMIN — SODIUM CHLORIDE, SODIUM LACTATE, POTASSIUM CHLORIDE, AND CALCIUM CHLORIDE: .6; .31; .03; .02 INJECTION, SOLUTION INTRAVENOUS at 05:06

## 2022-01-01 RX ADMIN — ACETAMINOPHEN 650 MG: 325 TABLET ORAL at 09:01

## 2022-01-01 RX ADMIN — PRAMIPEXOLE DIHYDROCHLORIDE 1 MG: 0.5 TABLET ORAL at 10:06

## 2022-01-01 RX ADMIN — CARVEDILOL 3.12 MG: 3.12 TABLET, FILM COATED ORAL at 09:07

## 2022-01-01 RX ADMIN — AMLODIPINE BESYLATE 5 MG: 5 TABLET ORAL at 09:06

## 2022-01-01 RX ADMIN — CEFTRIAXONE 1 G: 1 INJECTION, SOLUTION INTRAVENOUS at 07:07

## 2022-01-01 RX ADMIN — FAMOTIDINE 20 MG: 10 INJECTION INTRAVENOUS at 09:06

## 2022-01-01 RX ADMIN — BACLOFEN 10 MG: 10 TABLET ORAL at 09:06

## 2022-01-01 RX ADMIN — BACLOFEN 10 MG: 10 TABLET ORAL at 08:06

## 2022-01-01 RX ADMIN — CARVEDILOL 3.12 MG: 3.12 TABLET, FILM COATED ORAL at 08:08

## 2022-01-01 RX ADMIN — Medication 6 MG: at 08:06

## 2022-01-01 RX ADMIN — DONEPEZIL HYDROCHLORIDE 10 MG: 5 TABLET ORAL at 08:06

## 2022-01-01 RX ADMIN — FUROSEMIDE 40 MG: 10 INJECTION, SOLUTION INTRAVENOUS at 06:07

## 2022-01-01 RX ADMIN — INSULIN ASPART 8 UNITS: 100 INJECTION, SOLUTION INTRAVENOUS; SUBCUTANEOUS at 07:07

## 2022-01-01 RX ADMIN — FLUTICASONE PROPIONATE 50 MCG: 50 SPRAY, METERED NASAL at 10:06

## 2022-01-01 RX ADMIN — CLOPIDOGREL 75 MG: 75 TABLET, FILM COATED ORAL at 09:07

## 2022-01-01 RX ADMIN — FENTANYL 1 PATCH: 50 PATCH, EXTENDED RELEASE TRANSDERMAL at 08:06

## 2022-01-01 RX ADMIN — CEFTRIAXONE 1 G: 1 INJECTION, SOLUTION INTRAVENOUS at 05:06

## 2022-01-01 RX ADMIN — POTASSIUM CHLORIDE 40 MEQ: 20 TABLET, EXTENDED RELEASE ORAL at 09:06

## 2022-01-01 RX ADMIN — INSULIN ASPART 6 UNITS: 100 INJECTION, SOLUTION INTRAVENOUS; SUBCUTANEOUS at 11:07

## 2022-01-01 RX ADMIN — DONEPEZIL HYDROCHLORIDE 10 MG: 5 TABLET ORAL at 09:06

## 2022-01-01 RX ADMIN — ACETAMINOPHEN 650 MG: 325 TABLET ORAL at 10:06

## 2022-01-01 RX ADMIN — PANTOPRAZOLE SODIUM 40 MG: 40 INJECTION, POWDER, FOR SOLUTION INTRAVENOUS at 08:06

## 2022-01-01 RX ADMIN — INSULIN ASPART 5 UNITS: 100 INJECTION, SOLUTION INTRAVENOUS; SUBCUTANEOUS at 09:07

## 2022-01-01 RX ADMIN — SODIUM CHLORIDE: 0.45 INJECTION, SOLUTION INTRAVENOUS at 04:06

## 2022-01-01 RX ADMIN — AMLODIPINE BESYLATE 5 MG: 5 TABLET ORAL at 09:07

## 2022-01-01 RX ADMIN — CEFTRIAXONE 1 G: 1 INJECTION, SOLUTION INTRAVENOUS at 09:07

## 2022-01-01 RX ADMIN — PRAMIPEXOLE DIHYDROCHLORIDE 1 MG: 0.5 TABLET ORAL at 02:06

## 2022-01-01 RX ADMIN — PRAMIPEXOLE DIHYDROCHLORIDE 1 MG: 0.5 TABLET ORAL at 08:06

## 2022-01-01 RX ADMIN — FLUTICASONE PROPIONATE 50 MCG: 50 SPRAY, METERED NASAL at 09:06

## 2022-01-01 RX ADMIN — CEFTRIAXONE 1 G: 1 INJECTION, SOLUTION INTRAVENOUS at 05:08

## 2022-01-01 RX ADMIN — PANTOPRAZOLE SODIUM 40 MG: 40 INJECTION, POWDER, FOR SOLUTION INTRAVENOUS at 09:06

## 2022-01-01 RX ADMIN — FAMOTIDINE 20 MG: 10 INJECTION INTRAVENOUS at 08:06

## 2022-01-01 RX ADMIN — POTASSIUM CHLORIDE 40 MEQ: 20 TABLET, EXTENDED RELEASE ORAL at 08:06

## 2022-01-01 RX ADMIN — GABAPENTIN 100 MG: 100 CAPSULE ORAL at 08:06

## 2022-01-01 RX ADMIN — LEVOTHYROXINE SODIUM 125 MCG: 125 TABLET ORAL at 05:01

## 2022-01-01 RX ADMIN — INSULIN ASPART 2 UNITS: 100 INJECTION, SOLUTION INTRAVENOUS; SUBCUTANEOUS at 09:07

## 2022-01-01 RX ADMIN — SERTRALINE HYDROCHLORIDE 50 MG: 50 TABLET ORAL at 08:06

## 2022-01-01 RX ADMIN — PRAVASTATIN SODIUM 20 MG: 20 TABLET ORAL at 08:07

## 2022-01-01 RX ADMIN — PRAMIPEXOLE DIHYDROCHLORIDE 1 MG: 0.5 TABLET ORAL at 09:06

## 2022-01-01 RX ADMIN — PRAMIPEXOLE DIHYDROCHLORIDE 1 MG: 0.5 TABLET ORAL at 09:01

## 2022-01-01 RX ADMIN — CEFTRIAXONE 1 G: 1 INJECTION, SOLUTION INTRAVENOUS at 06:06

## 2022-01-01 RX ADMIN — INSULIN ASPART 3 UNITS: 100 INJECTION, SOLUTION INTRAVENOUS; SUBCUTANEOUS at 09:07

## 2022-01-01 RX ADMIN — BACLOFEN 10 MG: 10 TABLET ORAL at 02:06

## 2022-01-01 RX ADMIN — PANTOPRAZOLE SODIUM 40 MG: 40 TABLET, DELAYED RELEASE ORAL at 09:07

## 2022-01-01 RX ADMIN — Medication 10 ML: at 05:07

## 2022-01-01 RX ADMIN — ALBUTEROL SULFATE 2.5 MG: 2.5 SOLUTION RESPIRATORY (INHALATION) at 01:01

## 2022-01-01 RX ADMIN — ISOSORBIDE MONONITRATE 30 MG: 30 TABLET, EXTENDED RELEASE ORAL at 09:07

## 2022-01-01 RX ADMIN — SERTRALINE HYDROCHLORIDE 50 MG: 50 TABLET ORAL at 10:06

## 2022-01-01 RX ADMIN — FUROSEMIDE 40 MG: 10 INJECTION, SOLUTION INTRAVENOUS at 05:07

## 2022-01-01 RX ADMIN — INSULIN ASPART 6 UNITS: 100 INJECTION, SOLUTION INTRAVENOUS; SUBCUTANEOUS at 05:01

## 2022-01-01 RX ADMIN — INSULIN ASPART 2 UNITS: 100 INJECTION, SOLUTION INTRAVENOUS; SUBCUTANEOUS at 09:06

## 2022-01-01 RX ADMIN — ALBUTEROL SULFATE 2.5 MG: 2.5 SOLUTION RESPIRATORY (INHALATION) at 07:01

## 2022-01-01 RX ADMIN — PANTOPRAZOLE SODIUM 40 MG: 40 TABLET, DELAYED RELEASE ORAL at 08:08

## 2022-01-01 RX ADMIN — MIRTAZAPINE 7.5 MG: 7.5 TABLET ORAL at 09:01

## 2022-01-01 RX ADMIN — PANTOPRAZOLE SODIUM 40 MG: 40 TABLET, DELAYED RELEASE ORAL at 09:06

## 2022-01-01 RX ADMIN — CARVEDILOL 3.12 MG: 3.12 TABLET, FILM COATED ORAL at 08:07

## 2022-01-01 RX ADMIN — ASPIRIN 81 MG: 81 TABLET, COATED ORAL at 09:01

## 2022-01-01 RX ADMIN — PANTOPRAZOLE SODIUM 40 MG: 40 INJECTION, POWDER, FOR SOLUTION INTRAVENOUS at 10:06

## 2022-01-01 RX ADMIN — LEVOFLOXACIN 500 MG: 500 TABLET, FILM COATED ORAL at 08:08

## 2022-01-01 RX ADMIN — AMLODIPINE BESYLATE 5 MG: 5 TABLET ORAL at 10:06

## 2022-01-01 RX ADMIN — ATORVASTATIN CALCIUM 20 MG: 20 TABLET, FILM COATED ORAL at 10:06

## 2022-01-01 RX ADMIN — LEVOFLOXACIN 500 MG: 500 INJECTION, SOLUTION INTRAVENOUS at 08:07

## 2022-01-01 RX ADMIN — SODIUM CHLORIDE: 0.45 INJECTION, SOLUTION INTRAVENOUS at 02:06

## 2022-01-01 RX ADMIN — SODIUM CHLORIDE: 0.45 INJECTION, SOLUTION INTRAVENOUS at 11:06

## 2022-01-01 RX ADMIN — LEVOFLOXACIN 500 MG: 500 INJECTION, SOLUTION INTRAVENOUS at 09:07

## 2022-01-01 RX ADMIN — GABAPENTIN 100 MG: 100 CAPSULE ORAL at 09:06

## 2022-01-01 RX ADMIN — CEFTRIAXONE 1 G: 1 INJECTION, SOLUTION INTRAVENOUS at 08:07

## 2022-01-01 RX ADMIN — INSULIN ASPART 2 UNITS: 100 INJECTION, SOLUTION INTRAVENOUS; SUBCUTANEOUS at 05:01

## 2022-01-01 RX ADMIN — FLUTICASONE PROPIONATE 50 MCG: 50 SPRAY, METERED NASAL at 09:01

## 2022-01-01 RX ADMIN — ACETAMINOPHEN 650 MG: 325 TABLET ORAL at 08:06

## 2022-01-01 RX ADMIN — VENLAFAXINE HYDROCHLORIDE 75 MG: 75 CAPSULE, EXTENDED RELEASE ORAL at 09:06

## 2022-01-01 RX ADMIN — INSULIN ASPART 6 UNITS: 100 INJECTION, SOLUTION INTRAVENOUS; SUBCUTANEOUS at 04:07

## 2022-01-01 RX ADMIN — ASPIRIN 81 MG: 81 TABLET, COATED ORAL at 08:08

## 2022-01-01 RX ADMIN — FUROSEMIDE 40 MG: 40 TABLET ORAL at 09:01

## 2022-01-01 RX ADMIN — FLUTICASONE PROPIONATE 50 MCG: 50 SPRAY, METERED NASAL at 08:06

## 2022-01-01 RX ADMIN — DONEPEZIL HYDROCHLORIDE 10 MG: 5 TABLET, FILM COATED ORAL at 09:01

## 2022-01-01 RX ADMIN — SODIUM CHLORIDE: 0.45 INJECTION, SOLUTION INTRAVENOUS at 09:06

## 2022-01-01 RX ADMIN — Medication 10 ML: at 09:06

## 2022-01-01 RX ADMIN — ATORVASTATIN CALCIUM 20 MG: 20 TABLET, FILM COATED ORAL at 08:06

## 2022-01-01 RX ADMIN — BACLOFEN 10 MG: 10 TABLET ORAL at 10:06

## 2022-01-01 RX ADMIN — SERTRALINE HYDROCHLORIDE 50 MG: 50 TABLET ORAL at 09:06

## 2022-01-01 RX ADMIN — VENLAFAXINE HYDROCHLORIDE 75 MG: 75 CAPSULE, EXTENDED RELEASE ORAL at 11:06

## 2022-01-01 RX ADMIN — LEVOTHYROXINE SODIUM 125 MCG: 125 TABLET ORAL at 05:07

## 2022-01-01 RX ADMIN — INSULIN ASPART 6 UNITS: 100 INJECTION, SOLUTION INTRAVENOUS; SUBCUTANEOUS at 12:06

## 2022-01-01 RX ADMIN — SODIUM CHLORIDE: 0.45 INJECTION, SOLUTION INTRAVENOUS at 08:06

## 2022-01-01 RX ADMIN — INSULIN ASPART 4 UNITS: 100 INJECTION, SOLUTION INTRAVENOUS; SUBCUTANEOUS at 08:08

## 2022-01-01 RX ADMIN — FUROSEMIDE 40 MG: 40 TABLET ORAL at 05:07

## 2022-01-01 RX ADMIN — SODIUM CHLORIDE: 0.45 INJECTION, SOLUTION INTRAVENOUS at 09:01

## 2022-01-01 RX ADMIN — INSULIN ASPART 6 UNITS: 100 INJECTION, SOLUTION INTRAVENOUS; SUBCUTANEOUS at 05:06

## 2022-01-01 RX ADMIN — INSULIN ASPART 4 UNITS: 100 INJECTION, SOLUTION INTRAVENOUS; SUBCUTANEOUS at 12:01

## 2022-01-01 RX ADMIN — BACLOFEN 10 MG: 10 TABLET ORAL at 03:06

## 2022-01-01 RX ADMIN — SODIUM SULFATE, POTASSIUM SULFATE, MAGNESIUM SULFATE 177 ML: 17.5; 3.13; 1.6 SOLUTION, CONCENTRATE ORAL at 03:06

## 2022-01-01 RX ADMIN — FUROSEMIDE 80 MG: 10 INJECTION, SOLUTION INTRAMUSCULAR; INTRAVENOUS at 01:08

## 2022-01-01 RX ADMIN — CLOPIDOGREL 75 MG: 75 TABLET, FILM COATED ORAL at 05:07

## 2022-01-01 RX ADMIN — LEVOTHYROXINE SODIUM 125 MCG: 125 TABLET ORAL at 06:07

## 2022-01-01 RX ADMIN — FUROSEMIDE 40 MG: 40 TABLET ORAL at 09:07

## 2022-01-01 RX ADMIN — SODIUM CHLORIDE, SODIUM LACTATE, POTASSIUM CHLORIDE, AND CALCIUM CHLORIDE: .6; .31; .03; .02 INJECTION, SOLUTION INTRAVENOUS at 09:06

## 2022-01-01 RX ADMIN — INSULIN ASPART 4 UNITS: 100 INJECTION, SOLUTION INTRAVENOUS; SUBCUTANEOUS at 09:01

## 2022-01-01 RX ADMIN — INSULIN DETEMIR 10 UNITS: 100 INJECTION, SOLUTION SUBCUTANEOUS at 09:06

## 2022-01-01 RX ADMIN — NITROGLYCERIN 1 PATCH: 0.4 PATCH TRANSDERMAL at 09:07

## 2022-01-01 RX ADMIN — Medication 6 MG: at 09:06

## 2022-01-01 RX ADMIN — PRAMIPEXOLE DIHYDROCHLORIDE 1 MG: 0.5 TABLET ORAL at 04:06

## 2022-01-01 RX ADMIN — ATORVASTATIN CALCIUM 20 MG: 20 TABLET, FILM COATED ORAL at 09:06

## 2022-01-01 RX ADMIN — FENTANYL 1 PATCH: 50 PATCH, EXTENDED RELEASE TRANSDERMAL at 09:06

## 2022-01-01 RX ADMIN — MAGNESIUM SULFATE HEPTAHYDRATE 2 G: 40 INJECTION, SOLUTION INTRAVENOUS at 09:07

## 2022-01-01 RX ADMIN — LEVOTHYROXINE SODIUM 125 MCG: 125 TABLET ORAL at 05:06

## 2022-01-01 RX ADMIN — SODIUM CHLORIDE, SODIUM LACTATE, POTASSIUM CHLORIDE, AND CALCIUM CHLORIDE: .6; .31; .03; .02 INJECTION, SOLUTION INTRAVENOUS at 12:06

## 2022-01-01 RX ADMIN — LEVOTHYROXINE SODIUM 125 MCG: 125 TABLET ORAL at 06:08

## 2022-01-01 RX ADMIN — ISOSORBIDE MONONITRATE 30 MG: 30 TABLET, EXTENDED RELEASE ORAL at 08:07

## 2022-01-01 RX ADMIN — FUROSEMIDE 40 MG: 10 INJECTION, SOLUTION INTRAMUSCULAR; INTRAVENOUS at 07:07

## 2022-01-01 RX ADMIN — BUSPIRONE HYDROCHLORIDE 10 MG: 5 TABLET ORAL at 09:06

## 2022-01-01 RX ADMIN — INSULIN ASPART 8 UNITS: 100 INJECTION, SOLUTION INTRAVENOUS; SUBCUTANEOUS at 12:06

## 2022-01-01 RX ADMIN — ISOSORBIDE MONONITRATE 30 MG: 30 TABLET, EXTENDED RELEASE ORAL at 08:08

## 2022-01-01 RX ADMIN — AMLODIPINE BESYLATE 5 MG: 5 TABLET ORAL at 08:06

## 2022-01-01 RX ADMIN — PANTOPRAZOLE SODIUM 40 MG: 40 TABLET, DELAYED RELEASE ORAL at 05:07

## 2022-01-01 RX ADMIN — POTASSIUM CHLORIDE 40 MEQ: 20 TABLET, EXTENDED RELEASE ORAL at 10:06

## 2022-01-01 RX ADMIN — LEVOTHYROXINE SODIUM 150 MCG: 150 TABLET ORAL at 06:06

## 2022-01-01 RX ADMIN — INSULIN ASPART 10 UNITS: 100 INJECTION, SOLUTION INTRAVENOUS; SUBCUTANEOUS at 09:07

## 2022-01-01 RX ADMIN — SERTRALINE HYDROCHLORIDE 50 MG: 50 TABLET ORAL at 09:01

## 2022-01-01 RX ADMIN — INSULIN ASPART 10 UNITS: 100 INJECTION, SOLUTION INTRAVENOUS; SUBCUTANEOUS at 11:07

## 2022-01-01 RX ADMIN — INSULIN ASPART 4 UNITS: 100 INJECTION, SOLUTION INTRAVENOUS; SUBCUTANEOUS at 04:07

## 2022-01-01 RX ADMIN — INSULIN DETEMIR 20 UNITS: 100 INJECTION, SOLUTION SUBCUTANEOUS at 09:01

## 2022-01-01 RX ADMIN — PANTOPRAZOLE SODIUM 40 MG: 40 TABLET, DELAYED RELEASE ORAL at 09:01

## 2022-01-01 RX ADMIN — AMLODIPINE BESYLATE 5 MG: 5 TABLET ORAL at 08:08

## 2022-01-01 RX ADMIN — FUROSEMIDE 40 MG: 40 TABLET ORAL at 08:08

## 2022-01-01 RX ADMIN — FAMOTIDINE 20 MG: 20 TABLET ORAL at 09:06

## 2022-01-01 RX ADMIN — NITROGLYCERIN 1 PATCH: 0.4 PATCH TRANSDERMAL at 05:07

## 2022-01-01 RX ADMIN — ATORVASTATIN CALCIUM 10 MG: 10 TABLET, FILM COATED ORAL at 09:01

## 2022-01-01 RX ADMIN — LEVOTHYROXINE SODIUM 150 MCG: 150 TABLET ORAL at 05:06

## 2022-01-01 RX ADMIN — PANTOPRAZOLE SODIUM 40 MG: 40 INJECTION, POWDER, FOR SOLUTION INTRAVENOUS at 05:06

## 2022-01-01 RX ADMIN — INSULIN ASPART 10 UNITS: 100 INJECTION, SOLUTION INTRAVENOUS; SUBCUTANEOUS at 05:07

## 2022-01-01 NOTE — PROGRESS NOTES
[facility] Medicine  Progress Note    Patient Name: Laisha Acosta  MRN: [id number]  Patient Class: IP- Inpatient   Admission Date: 12/24/2021  Length of Stay: 8 days  Attending Physician: Jo Underwood MD  Primary Care Provider: Jo Underwood MD        Subjective:     Principal Problem:Encephalopathy        HPI:  86-year-old female history of dementia, diabetes, hypertension, obesity brought in by EMS for increased confusion and shortness of breath for the past few days.  Daughter said that patient has been having increased cough with wheezing.  Has had her COVID vaccine.  History otherwise limited due to medical condition      Overview/Hospital Course:  12/24 Given antibiotics, admit to floor    12/27/21 [facility] patient is lethargic, complaining of butt pain. Patient mental status and not at baseline. Medication review completed and medications adjusted. Will get daily labs and chest xray this am and monitor.     12/28/21 [facility] patient is more awake and alert this am, still lethargic and falls asleep easily but improvement from yesterday. Daughter is at bedside updated on patients condition. She reports that patient has intermittent abdominal pain will get xray today     12/29/21 [facility] patient's mental status is close to baseline and denies any pain or discomfort at this time. Will continue treatment, await final cultures, continue PT    12/30/21 [facility] Patient mental status is back to baseline. She is off of oxygen, will continue with nutrition and PT    12/31/21 CG: Doing well. Mentation is okay. Complains of sores on her bottom    1/1/22 CG: Slowly improving. NO acute overnight events. Labs unremarkable.       No new subjective & objective note has been filed under this hospital service since the last note was generated.      Assessment/Plan:      * Encephalopathy  12/27/21 [facility]: patient is lethargic this am and encephalopathy is likely multifactorial will continue to treat for UTI with Rocephin  and await final urine culture, get ABG today, medications adjusted and lyrica was held due to new medication. Continue to monitor.     12/28/21 [facility] slight improvement continue treatment and monitor    12/29/21 [facility] mental status close to baseline    12/30/21 [facility] resolved    12/31/21 CG Resolved     Protein malnutrition  Add glucerna and encourage po intake      Abdominal pain  Get kub    12/29/21 [facility] see kub above, abdominal pain resolved    12/30/21 [facility] continue amitaza likely secondary to constipation    Acute respiratory failure with hypoxia and hypercarbia  Patient with Hypercapnic and Hypoxic Respiratory failure which is Acute.  she is not on home oxygen. Supplemental oxygen was provided and noted-  .   Signs/symptoms of respiratory failure include- tachypnea and increased work of breathing. Contributing diagnoses includes - Pleural effusion and Pneumonia Labs and images were reviewed. Patient Has recent ABG, which has been reviewed. Will treat underlying causes and adjust management of respiratory failure as follows-     Continue monitoring oxygen, intermittent bipap and qhs     12/29/21 [facility] continue to wean oxygen as tolerates    12/30/21 [facility] patient on room air    DVT prophylaxis  lovenox       Hyperkalemia  Stop spironolactone and start lasix and monitor labs daily    12/28/21 [facility] continue lokelma, add low dose ivf and continue to monitor.     12/29/21 [facility] labs improving continue meds and continue to monitor    12/30/21 [facility] resolved with medications will stop lokelma and monitor.     Acute cystitis  IV antibiotics  Blood cultures pending    12/27/21 [facility] get blood cultures, continue rocephin and await final urine culture    12/28/21 LF prelim urine shows no growth await final continue rocephin    12/29/21 [facility] continue rocephin await final cultures    12/30/21 [facility]  Culture shows no growth    Pleural effusion    IV diuresis, will watch her urine output closely    12/27/21 LF get chest xray today, stop  spironolactone and start lasix due to elevated potassium, continue oxygen get abg and monitor    12/28/21 [facility] see chest xray above, continue lasix and bipap intermittent and q hs    12/29/21 [facility] improving with lasix, continue to monitor.     12/30/21 [facility] stable      VTE Risk Mitigation (From admission, onward)         Ordered     enoxaparin injection 40 mg  Daily         12/24/21 1359     IP VTE HIGH RISK PATIENT  Once         12/24/21 1359     Place sequential compression device  Until discontinued         12/24/21 1359                Discharge Planning   KIANA:      Code Status: Full Code   Is the patient medically ready for discharge?:     Reason for patient still in hospital (select all that apply): Treatment  Discharge Plan A: Return to nursing home                  Abdiel Macias DO  Department of Hospital Medicine   [facility]

## 2022-01-02 LAB
ALBUMIN SERPL BCP-MCNC: 2.4 G/DL (ref 3.5–5.2)
ALP SERPL-CCNC: 71 U/L (ref 55–135)
ALT SERPL W/O P-5'-P-CCNC: 23 U/L (ref 10–44)
ANION GAP SERPL CALC-SCNC: 6 MMOL/L (ref 8–16)
AST SERPL-CCNC: 15 U/L (ref 10–40)
BASOPHILS # BLD AUTO: 0.03 K/UL (ref 0–0.2)
BASOPHILS NFR BLD: 0.5 % (ref 0–1.9)
BILIRUB SERPL-MCNC: 0.4 MG/DL (ref 0.1–1)
BUN SERPL-MCNC: 35 MG/DL (ref 8–23)
CALCIUM SERPL-MCNC: 8.5 MG/DL (ref 8.7–10.5)
CHLORIDE SERPL-SCNC: 106 MMOL/L (ref 95–110)
CO2 SERPL-SCNC: 30 MMOL/L (ref 23–29)
CREAT SERPL-MCNC: 1.1 MG/DL (ref 0.5–1.4)
DIFFERENTIAL METHOD: ABNORMAL
EOSINOPHIL # BLD AUTO: 0.2 K/UL (ref 0–0.5)
EOSINOPHIL NFR BLD: 2.9 % (ref 0–8)
ERYTHROCYTE [DISTWIDTH] IN BLOOD BY AUTOMATED COUNT: 13.2 % (ref 11.5–14.5)
EST. GFR  (AFRICAN AMERICAN): 53.3 ML/MIN/1.73 M^2
EST. GFR  (NON AFRICAN AMERICAN): 46.2 ML/MIN/1.73 M^2
GLUCOSE SERPL-MCNC: 129 MG/DL (ref 70–110)
HCT VFR BLD AUTO: 33.4 % (ref 37–48.5)
HGB BLD-MCNC: 10.3 G/DL (ref 12–16)
IMM GRANULOCYTES # BLD AUTO: 0.05 K/UL (ref 0–0.04)
IMM GRANULOCYTES NFR BLD AUTO: 0.9 % (ref 0–0.5)
LYMPHOCYTES # BLD AUTO: 1.4 K/UL (ref 1–4.8)
LYMPHOCYTES NFR BLD: 24.7 % (ref 18–48)
MAGNESIUM SERPL-MCNC: 1.6 MG/DL (ref 1.6–2.6)
MCH RBC QN AUTO: 29.5 PG (ref 27–31)
MCHC RBC AUTO-ENTMCNC: 30.8 G/DL (ref 32–36)
MCV RBC AUTO: 96 FL (ref 82–98)
MONOCYTES # BLD AUTO: 0.8 K/UL (ref 0.3–1)
MONOCYTES NFR BLD: 13.6 % (ref 4–15)
NEUTROPHILS # BLD AUTO: 3.2 K/UL (ref 1.8–7.7)
NEUTROPHILS NFR BLD: 57.4 % (ref 38–73)
NRBC BLD-RTO: 0 /100 WBC
PLATELET # BLD AUTO: 132 K/UL (ref 150–450)
PMV BLD AUTO: 12.5 FL (ref 9.2–12.9)
POCT GLUCOSE: 201 MG/DL (ref 70–110)
POCT GLUCOSE: 225 MG/DL (ref 70–110)
POCT GLUCOSE: 256 MG/DL (ref 70–110)
POTASSIUM SERPL-SCNC: 3.9 MMOL/L (ref 3.5–5.1)
PROT SERPL-MCNC: 5.5 G/DL (ref 6–8.4)
RBC # BLD AUTO: 3.49 M/UL (ref 4–5.4)
SODIUM SERPL-SCNC: 142 MMOL/L (ref 136–145)
WBC # BLD AUTO: 5.5 K/UL (ref 3.9–12.7)

## 2022-01-02 PROCEDURE — 94640 AIRWAY INHALATION TREATMENT: CPT

## 2022-01-02 PROCEDURE — 25000003 PHARM REV CODE 250: Performed by: NURSE PRACTITIONER

## 2022-01-02 PROCEDURE — 36415 COLL VENOUS BLD VENIPUNCTURE: CPT | Performed by: NURSE PRACTITIONER

## 2022-01-02 PROCEDURE — 80053 COMPREHEN METABOLIC PANEL: CPT | Performed by: NURSE PRACTITIONER

## 2022-01-02 PROCEDURE — 63600175 PHARM REV CODE 636 W HCPCS: Performed by: INTERNAL MEDICINE

## 2022-01-02 PROCEDURE — 99900035 HC TECH TIME PER 15 MIN (STAT)

## 2022-01-02 PROCEDURE — 99900031 HC PATIENT EDUCATION (STAT)

## 2022-01-02 PROCEDURE — 85025 COMPLETE CBC W/AUTO DIFF WBC: CPT | Performed by: NURSE PRACTITIONER

## 2022-01-02 PROCEDURE — 11000001 HC ACUTE MED/SURG PRIVATE ROOM

## 2022-01-02 PROCEDURE — 94761 N-INVAS EAR/PLS OXIMETRY MLT: CPT

## 2022-01-02 PROCEDURE — 25000003 PHARM REV CODE 250: Performed by: INTERNAL MEDICINE

## 2022-01-02 PROCEDURE — 25000242 PHARM REV CODE 250 ALT 637 W/ HCPCS: Performed by: INTERNAL MEDICINE

## 2022-01-02 PROCEDURE — 63600175 PHARM REV CODE 636 W HCPCS: Performed by: STUDENT IN AN ORGANIZED HEALTH CARE EDUCATION/TRAINING PROGRAM

## 2022-01-02 PROCEDURE — 83735 ASSAY OF MAGNESIUM: CPT | Performed by: NURSE PRACTITIONER

## 2022-01-02 RX ORDER — CIPROFLOXACIN 500 MG/1
500 TABLET ORAL 2 TIMES DAILY
Status: DISCONTINUED | OUTPATIENT
Start: 2022-01-02 | End: 2022-01-03 | Stop reason: HOSPADM

## 2022-01-02 RX ADMIN — FLUTICASONE PROPIONATE 50 MCG: 50 SPRAY, METERED NASAL at 09:01

## 2022-01-02 RX ADMIN — FUROSEMIDE 40 MG: 40 TABLET ORAL at 09:01

## 2022-01-02 RX ADMIN — ALBUTEROL SULFATE 2.5 MG: 2.5 SOLUTION RESPIRATORY (INHALATION) at 01:01

## 2022-01-02 RX ADMIN — ACETAMINOPHEN 650 MG: 325 TABLET ORAL at 08:01

## 2022-01-02 RX ADMIN — PRAMIPEXOLE DIHYDROCHLORIDE 1 MG: 0.5 TABLET ORAL at 08:01

## 2022-01-02 RX ADMIN — SERTRALINE HYDROCHLORIDE 50 MG: 50 TABLET ORAL at 08:01

## 2022-01-02 RX ADMIN — INSULIN ASPART 4 UNITS: 100 INJECTION, SOLUTION INTRAVENOUS; SUBCUTANEOUS at 12:01

## 2022-01-02 RX ADMIN — PRAMIPEXOLE DIHYDROCHLORIDE 1 MG: 0.5 TABLET ORAL at 09:01

## 2022-01-02 RX ADMIN — LEVOTHYROXINE SODIUM 125 MCG: 125 TABLET ORAL at 05:01

## 2022-01-02 RX ADMIN — FENTANYL 1 PATCH: 50 PATCH, EXTENDED RELEASE TRANSDERMAL at 06:01

## 2022-01-02 RX ADMIN — INSULIN ASPART 2 UNITS: 100 INJECTION, SOLUTION INTRAVENOUS; SUBCUTANEOUS at 08:01

## 2022-01-02 RX ADMIN — DONEPEZIL HYDROCHLORIDE 10 MG: 5 TABLET, FILM COATED ORAL at 08:01

## 2022-01-02 RX ADMIN — ASPIRIN 81 MG: 81 TABLET, COATED ORAL at 09:01

## 2022-01-02 RX ADMIN — CEFTRIAXONE 1 G: 1 INJECTION, SOLUTION INTRAVENOUS at 12:01

## 2022-01-02 RX ADMIN — ALBUTEROL SULFATE 2.5 MG: 2.5 SOLUTION RESPIRATORY (INHALATION) at 07:01

## 2022-01-02 RX ADMIN — CIPROFLOXACIN 500 MG: 500 TABLET, FILM COATED ORAL at 08:01

## 2022-01-02 RX ADMIN — ACETAMINOPHEN 650 MG: 325 TABLET ORAL at 09:01

## 2022-01-02 RX ADMIN — ATORVASTATIN CALCIUM 10 MG: 10 TABLET, FILM COATED ORAL at 09:01

## 2022-01-02 RX ADMIN — INSULIN DETEMIR 20 UNITS: 100 INJECTION, SOLUTION SUBCUTANEOUS at 09:01

## 2022-01-02 RX ADMIN — ENOXAPARIN SODIUM 40 MG: 40 INJECTION SUBCUTANEOUS at 05:01

## 2022-01-02 RX ADMIN — MIRTAZAPINE 7.5 MG: 7.5 TABLET ORAL at 08:01

## 2022-01-02 RX ADMIN — PANTOPRAZOLE SODIUM 40 MG: 40 TABLET, DELAYED RELEASE ORAL at 09:01

## 2022-01-02 NOTE — PLAN OF CARE
01/02/22 1208   Medicare Message   Important Message from Medicare regarding Discharge Appeal Rights Given to patient/caregiver;Explained to patient/caregiver;Other (comments)  (Verbal consent from patient's daughterKenzie.)

## 2022-01-02 NOTE — PLAN OF CARE
Problem: Adult Inpatient Plan of Care  Goal: Plan of Care Review  Outcome: Ongoing, Progressing  Goal: Patient-Specific Goal (Individualized)  Outcome: Ongoing, Progressing  Goal: Absence of Hospital-Acquired Illness or Injury  Outcome: Ongoing, Progressing  Goal: Optimal Comfort and Wellbeing  Outcome: Ongoing, Progressing  Goal: Readiness for Transition of Care  Outcome: Ongoing, Progressing  Will continue to monitor

## 2022-01-02 NOTE — PROGRESS NOTES
[facility] Medicine  Progress Note    Patient Name: Laisha Acosta  MRN: [id number]  Patient Class: IP- Inpatient   Admission Date: 12/24/2021  Length of Stay: 9 days  Attending Physician: Jo Underwood MD  Primary Care Provider: Jo Underwood MD        Subjective:     Principal Problem:Encephalopathy        HPI:  86-year-old female history of dementia, diabetes, hypertension, obesity brought in by EMS for increased confusion and shortness of breath for the past few days.  Daughter said that patient has been having increased cough with wheezing.  Has had her COVID vaccine.  History otherwise limited due to medical condition      Overview/Hospital Course:  12/24 Given antibiotics, admit to floor    12/27/21 [facility] patient is lethargic, complaining of butt pain. Patient mental status and not at baseline. Medication review completed and medications adjusted. Will get daily labs and chest xray this am and monitor.     12/28/21 [facility] patient is more awake and alert this am, still lethargic and falls asleep easily but improvement from yesterday. Daughter is at bedside updated on patients condition. She reports that patient has intermittent abdominal pain will get xray today     12/29/21 [facility] patient's mental status is close to baseline and denies any pain or discomfort at this time. Will continue treatment, await final cultures, continue PT    12/30/21 [facility] Patient mental status is back to baseline. She is off of oxygen, will continue with nutrition and PT    12/31/21 CG: Doing well. Mentation is okay. Complains of sores on her bottom    1/1/22 CG: Slowly improving. NO acute overnight events. Labs unremarkable.     1/2/22 CG: No acute overnight events. Labs stable.       Interval History:     Review of Systems  Objective:     Vital Signs (Most Recent):  Temp: 98.2 °F (36.8 °C) (01/02/22 1150)  Pulse: 74 (01/02/22 1345)  Resp: 18 (01/02/22 1345)  BP: (!) 140/64 (01/02/22 1150)  SpO2: (!) 94 % (01/02/22  1345) Vital Signs (24h Range):  Temp:  [97.9 °F (36.6 °C)-99.5 °F (37.5 °C)] 98.2 °F (36.8 °C)  Pulse:  [64-81] 74  Resp:  [14-20] 18  SpO2:  [92 %-98 %] 94 %  BP: (110-158)/(55-67) 140/64     Weight: 112.3 kg (247 lb 8 oz)  Body mass index is 43.84 kg/m².    Intake/Output Summary (Last 24 hours) at 1/2/2022 1537  Last data filed at 1/2/2022 0600  Gross per 24 hour   Intake 2120 ml   Output 1200 ml   Net 920 ml      Physical Exam    Significant Labs:   All pertinent labs within the past 24 hours have been reviewed.  CBC:   Recent Labs   Lab 01/01/22 0318 01/02/22 0311   WBC 6.34 5.50   HGB 10.6* 10.3*   HCT 34.0* 33.4*   * 132*     CMP:   Recent Labs   Lab 01/01/22 0318 01/02/22 0311    142   K 4.2 3.9    106   CO2 31* 30*   * 129*   BUN 32* 35*   CREATININE 1.0 1.1   CALCIUM 8.8 8.5*   PROT 6.0 5.5*   ALBUMIN 2.6* 2.4*   BILITOT 0.4 0.4   ALKPHOS 76 71   AST 14 15   ALT 23 23   ANIONGAP 5* 6*   EGFRNONAA 51.9* 46.2*     Lactic Acid: No results for input(s): LACTATE in the last 48 hours.    Significant Imaging: I have reviewed all pertinent imaging results/findings within the past 24 hours.      Assessment/Plan:      * Encephalopathy  12/27/21 [facility]: patient is lethargic this am and encephalopathy is likely multifactorial will continue to treat for UTI with Rocephin and await final urine culture, get ABG today, medications adjusted and lyrica was held due to new medication. Continue to monitor.     12/28/21 [facility] slight improvement continue treatment and monitor    12/29/21 [facility] mental status close to baseline    12/30/21 [facility] resolved    12/31/21 CG Resolved     Protein malnutrition  Add glucerna and encourage po intake      Abdominal pain  Get kub    12/29/21 [facility] see kub above, abdominal pain resolved    12/30/21 [facility] continue amitaza likely secondary to constipation    Acute respiratory failure with hypoxia and hypercarbia  Patient with Hypercapnic and Hypoxic Respiratory failure which is  Acute.  she is not on home oxygen. Supplemental oxygen was provided and noted-  .   Signs/symptoms of respiratory failure include- tachypnea and increased work of breathing. Contributing diagnoses includes - Pleural effusion and Pneumonia Labs and images were reviewed. Patient Has recent ABG, which has been reviewed. Will treat underlying causes and adjust management of respiratory failure as follows-     Continue monitoring oxygen, intermittent bipap and qhs     12/29/21 [facility] continue to wean oxygen as tolerates    12/30/21 [facility] patient on room air    DVT prophylaxis  lovenox       Hyperkalemia  Stop spironolactone and start lasix and monitor labs daily    12/28/21 [facility] continue lokelma, add low dose ivf and continue to monitor.     12/29/21 [facility] labs improving continue meds and continue to monitor    12/30/21 [facility] resolved with medications will stop lokelma and monitor.     Acute cystitis  IV antibiotics  Blood cultures pending    12/27/21 [facility] get blood cultures, continue rocephin and await final urine culture    12/28/21 [facility] prelim urine shows no growth await final continue rocephin    12/29/21 [facility] continue rocephin await final cultures    12/30/21 [facility]  Culture shows no growth    1/2/22 CG: IV Rocephin switched to oral Cipro    Pleural effusion    IV diuresis, will watch her urine output closely    12/27/21 [facility] get chest xray today, stop spironolactone and start lasix due to elevated potassium, continue oxygen get abg and monitor    12/28/21 [facility] see chest xray above, continue lasix and bipap intermittent and q hs    12/29/21 [facility] improving with lasix, continue to monitor.     12/30/21 [facility] stable      VTE Risk Mitigation (From admission, onward)         Ordered     enoxaparin injection 40 mg  Daily         12/24/21 1359     IP VTE HIGH RISK PATIENT  Once         12/24/21 1359     Place sequential compression device  Until discontinued         12/24/21 1359                Discharge Planning   KIANA:      Code Status: Full  Code   Is the patient medically ready for discharge?:     Reason for patient still in hospital (select all that apply): Treatment  Discharge Plan A: Return to nursing home                  Abdiel Macias DO  Department of Hospital Medicine   [facility]

## 2022-01-02 NOTE — SUBJECTIVE & OBJECTIVE
Interval History:     Review of Systems  Objective:     Vital Signs (Most Recent):  Temp: 98.2 °F (36.8 °C) (01/02/22 1150)  Pulse: 74 (01/02/22 1345)  Resp: 18 (01/02/22 1345)  BP: (!) 140/64 (01/02/22 1150)  SpO2: (!) 94 % (01/02/22 1345) Vital Signs (24h Range):  Temp:  [97.9 °F (36.6 °C)-99.5 °F (37.5 °C)] 98.2 °F (36.8 °C)  Pulse:  [64-81] 74  Resp:  [14-20] 18  SpO2:  [92 %-98 %] 94 %  BP: (110-158)/(55-67) 140/64     Weight: 112.3 kg (247 lb 8 oz)  Body mass index is 43.84 kg/m².    Intake/Output Summary (Last 24 hours) at 1/2/2022 1537  Last data filed at 1/2/2022 0600  Gross per 24 hour   Intake 2120 ml   Output 1200 ml   Net 920 ml      Physical Exam    Significant Labs:   All pertinent labs within the past 24 hours have been reviewed.  CBC:   Recent Labs   Lab 01/01/22  0318 01/02/22  0311   WBC 6.34 5.50   HGB 10.6* 10.3*   HCT 34.0* 33.4*   * 132*     CMP:   Recent Labs   Lab 01/01/22  0318 01/02/22  0311    142   K 4.2 3.9    106   CO2 31* 30*   * 129*   BUN 32* 35*   CREATININE 1.0 1.1   CALCIUM 8.8 8.5*   PROT 6.0 5.5*   ALBUMIN 2.6* 2.4*   BILITOT 0.4 0.4   ALKPHOS 76 71   AST 14 15   ALT 23 23   ANIONGAP 5* 6*   EGFRNONAA 51.9* 46.2*     Lactic Acid: No results for input(s): LACTATE in the last 48 hours.    Significant Imaging: I have reviewed all pertinent imaging results/findings within the past 24 hours.

## 2022-01-02 NOTE — ASSESSMENT & PLAN NOTE
IV antibiotics  Blood cultures pending    12/27/21 [facility] get blood cultures, continue rocephin and await final urine culture    12/28/21 [facility] prelim urine shows no growth await final continue rocephin    12/29/21 [facility] continue rocephin await final cultures    12/30/21 [facility]  Culture shows no growth    1/2/22 CG: IV Rocephin switched to oral Cipro

## 2022-01-03 VITALS
RESPIRATION RATE: 18 BRPM | OXYGEN SATURATION: 95 % | WEIGHT: 247.5 LBS | TEMPERATURE: 98 F | HEART RATE: 76 BPM | HEIGHT: 63 IN | SYSTOLIC BLOOD PRESSURE: 136 MMHG | BODY MASS INDEX: 43.85 KG/M2 | DIASTOLIC BLOOD PRESSURE: 64 MMHG

## 2022-01-03 LAB
ALBUMIN SERPL BCP-MCNC: 2.5 G/DL (ref 3.5–5.2)
ALP SERPL-CCNC: 78 U/L (ref 55–135)
ALT SERPL W/O P-5'-P-CCNC: 49 U/L (ref 10–44)
ANION GAP SERPL CALC-SCNC: 7 MMOL/L (ref 8–16)
AST SERPL-CCNC: 37 U/L (ref 10–40)
BASOPHILS # BLD AUTO: 0.04 K/UL (ref 0–0.2)
BASOPHILS NFR BLD: 0.7 % (ref 0–1.9)
BILIRUB SERPL-MCNC: 0.4 MG/DL (ref 0.1–1)
BUN SERPL-MCNC: 38 MG/DL (ref 8–23)
CALCIUM SERPL-MCNC: 8.5 MG/DL (ref 8.7–10.5)
CHLORIDE SERPL-SCNC: 105 MMOL/L (ref 95–110)
CO2 SERPL-SCNC: 30 MMOL/L (ref 23–29)
CREAT SERPL-MCNC: 1.1 MG/DL (ref 0.5–1.4)
DIFFERENTIAL METHOD: ABNORMAL
EOSINOPHIL # BLD AUTO: 0.2 K/UL (ref 0–0.5)
EOSINOPHIL NFR BLD: 3.7 % (ref 0–8)
ERYTHROCYTE [DISTWIDTH] IN BLOOD BY AUTOMATED COUNT: 13.2 % (ref 11.5–14.5)
EST. GFR  (AFRICAN AMERICAN): 53.3 ML/MIN/1.73 M^2
EST. GFR  (NON AFRICAN AMERICAN): 46.2 ML/MIN/1.73 M^2
GLUCOSE SERPL-MCNC: 230 MG/DL (ref 70–110)
HCT VFR BLD AUTO: 33.5 % (ref 37–48.5)
HGB BLD-MCNC: 10.5 G/DL (ref 12–16)
IMM GRANULOCYTES # BLD AUTO: 0.08 K/UL (ref 0–0.04)
IMM GRANULOCYTES NFR BLD AUTO: 1.3 % (ref 0–0.5)
LYMPHOCYTES # BLD AUTO: 1.2 K/UL (ref 1–4.8)
LYMPHOCYTES NFR BLD: 20.5 % (ref 18–48)
MAGNESIUM SERPL-MCNC: 1.7 MG/DL (ref 1.6–2.6)
MCH RBC QN AUTO: 30.2 PG (ref 27–31)
MCHC RBC AUTO-ENTMCNC: 31.3 G/DL (ref 32–36)
MCV RBC AUTO: 96 FL (ref 82–98)
MONOCYTES # BLD AUTO: 0.7 K/UL (ref 0.3–1)
MONOCYTES NFR BLD: 12.1 % (ref 4–15)
NEUTROPHILS # BLD AUTO: 3.7 K/UL (ref 1.8–7.7)
NEUTROPHILS NFR BLD: 61.7 % (ref 38–73)
NRBC BLD-RTO: 0 /100 WBC
PLATELET # BLD AUTO: 132 K/UL (ref 150–450)
PMV BLD AUTO: 12.5 FL (ref 9.2–12.9)
POCT GLUCOSE: 202 MG/DL (ref 70–110)
POCT GLUCOSE: 233 MG/DL (ref 70–110)
POTASSIUM SERPL-SCNC: 3.9 MMOL/L (ref 3.5–5.1)
PROT SERPL-MCNC: 5.8 G/DL (ref 6–8.4)
RBC # BLD AUTO: 3.48 M/UL (ref 4–5.4)
SODIUM SERPL-SCNC: 142 MMOL/L (ref 136–145)
WBC # BLD AUTO: 6.01 K/UL (ref 3.9–12.7)

## 2022-01-03 PROCEDURE — 94640 AIRWAY INHALATION TREATMENT: CPT

## 2022-01-03 PROCEDURE — 25000003 PHARM REV CODE 250: Performed by: INTERNAL MEDICINE

## 2022-01-03 PROCEDURE — 99900035 HC TECH TIME PER 15 MIN (STAT)

## 2022-01-03 PROCEDURE — 25000242 PHARM REV CODE 250 ALT 637 W/ HCPCS: Performed by: INTERNAL MEDICINE

## 2022-01-03 PROCEDURE — 25000003 PHARM REV CODE 250: Performed by: NURSE PRACTITIONER

## 2022-01-03 PROCEDURE — 80053 COMPREHEN METABOLIC PANEL: CPT | Performed by: NURSE PRACTITIONER

## 2022-01-03 PROCEDURE — 94761 N-INVAS EAR/PLS OXIMETRY MLT: CPT

## 2022-01-03 PROCEDURE — 83735 ASSAY OF MAGNESIUM: CPT | Performed by: NURSE PRACTITIONER

## 2022-01-03 PROCEDURE — 97110 THERAPEUTIC EXERCISES: CPT

## 2022-01-03 PROCEDURE — 85025 COMPLETE CBC W/AUTO DIFF WBC: CPT | Performed by: NURSE PRACTITIONER

## 2022-01-03 PROCEDURE — 36415 COLL VENOUS BLD VENIPUNCTURE: CPT | Performed by: NURSE PRACTITIONER

## 2022-01-03 PROCEDURE — 99900031 HC PATIENT EDUCATION (STAT)

## 2022-01-03 RX ORDER — CIPROFLOXACIN 500 MG/1
500 TABLET ORAL 2 TIMES DAILY
Qty: 10 TABLET | Refills: 0
Start: 2022-01-03 | End: 2022-01-08

## 2022-01-03 RX ADMIN — PANTOPRAZOLE SODIUM 40 MG: 40 TABLET, DELAYED RELEASE ORAL at 08:01

## 2022-01-03 RX ADMIN — CIPROFLOXACIN 500 MG: 500 TABLET, FILM COATED ORAL at 08:01

## 2022-01-03 RX ADMIN — ALBUTEROL SULFATE 2.5 MG: 2.5 SOLUTION RESPIRATORY (INHALATION) at 01:01

## 2022-01-03 RX ADMIN — ALBUTEROL SULFATE 2.5 MG: 2.5 SOLUTION RESPIRATORY (INHALATION) at 08:01

## 2022-01-03 RX ADMIN — ATORVASTATIN CALCIUM 10 MG: 10 TABLET, FILM COATED ORAL at 08:01

## 2022-01-03 RX ADMIN — ASPIRIN 81 MG: 81 TABLET, COATED ORAL at 08:01

## 2022-01-03 RX ADMIN — FUROSEMIDE 40 MG: 40 TABLET ORAL at 08:01

## 2022-01-03 RX ADMIN — FLUTICASONE PROPIONATE 50 MCG: 50 SPRAY, METERED NASAL at 08:01

## 2022-01-03 RX ADMIN — INSULIN ASPART 4 UNITS: 100 INJECTION, SOLUTION INTRAVENOUS; SUBCUTANEOUS at 11:01

## 2022-01-03 RX ADMIN — ACETAMINOPHEN 650 MG: 325 TABLET ORAL at 08:01

## 2022-01-03 RX ADMIN — PRAMIPEXOLE DIHYDROCHLORIDE 1 MG: 0.5 TABLET ORAL at 08:01

## 2022-01-03 RX ADMIN — LEVOTHYROXINE SODIUM 125 MCG: 125 TABLET ORAL at 05:01

## 2022-01-03 NOTE — PT/OT/SLP PROGRESS
Physical Therapy Treatment    Patient Name:  Laisha Acosta   MRN:  [id number]    Recommendations:     Discharge Recommendations:  nursing facility, skilled   Discharge Equipment Recommendations:     Barriers to discharge: None    Assessment:     Laisha Acosta is a 86 y.o. female admitted with a medical diagnosis of Encephalopathy.  She presents with the following impairments/functional limitations:    decreased strength, endurance, ROM impacting functional mobility.    Rehab Prognosis: Fair; patient would benefit from acute skilled PT services to address these deficits and reach maximum level of function.    Recent Surgery: * No surgery found *      Plan:     During this hospitalization, patient to be seen 6 x/week to address the identified rehab impairments via therapeutic activities,therapeutic exercises and progress toward the following goals:  ·     Subjective     Chief Complaint: Patient reports no new complaints. Patient states she may be going back to nursing home today.   Patient/Family Comments/goals: patient to return to nursing home when able  Pain/Comfort:  ·  no pain reported      Objective:     Communicated with nurse prior to session.  Patient found HOB elevated with purewick upon PT entry to room.     General Precautions: Standard, fall   Orthopedic Precautions:  n/a  Braces:  n/a           Therapeutic Activities and Exercises:   Patient participated in (L) LE AAROM in all motions x20 reps. (R) LE PROM gentle stretching to knee to impact positioning and skin integrity posterior knee. (R) LE DF AAROM. Patient encouraged to perform DF throughout the day and to work on extending (R) knee as much as able. Patient verbalized understanding.     Patient left HOB elevated with all lines intact and call button in reach..    GOALS:   Multidisciplinary Problems     Physical Therapy Goals     Not on file                Time Tracking:     PT Received On:   1/3/2022  PT Start Time:   0925   PT Stop Time:  0939  PT  Total Time (min):   14    Billable Minutes: Therapeutic Exercise 14 01/03/2022

## 2022-01-03 NOTE — ASSESSMENT & PLAN NOTE
Patient with Hypercapnic and Hypoxic Respiratory failure which is Acute.  she is not on home oxygen. Supplemental oxygen was provided and noted-  .   Signs/symptoms of respiratory failure include- tachypnea and increased work of breathing. Contributing diagnoses includes - Pleural effusion and Pneumonia Labs and images were reviewed. Patient Has recent ABG, which has been reviewed. Will treat underlying causes and adjust management of respiratory failure as follows-     Continue monitoring oxygen, intermittent bipap and qhs     12/29/21 [facility] continue to wean oxygen as tolerates    12/30/21 [facility] patient on room air

## 2022-01-03 NOTE — ASSESSMENT & PLAN NOTE
IV antibiotics  Blood cultures pending    12/27/21 [facility] get blood cultures, continue rocephin and await final urine culture    12/28/21 [facility] prelim urine shows no growth await final continue rocephin    12/29/21 [facility] continue rocephin await final cultures    12/30/21 [facility]  Culture shows no growth    1/2/22 CG: IV Rocephin switched to oral Cipro    1/3/22 [facility] cipro for discharge x 5 days

## 2022-01-03 NOTE — DISCHARGE SUMMARY
[facility] Medicine  Discharge Summary      Patient Name: Laisha Acosta  MRN: [id number]  Patient Class: IP- Inpatient  Admission Date: 12/24/2021  Hospital Length of Stay: 10 days  Discharge Date and Time:  01/03/2022 9:51 AM  Attending Physician: Jo Underwood MD   Discharging Provider: Bibi Strange NP  Primary Care Provider: oJ Underwood MD      HPI:   86-year-old female history of dementia, diabetes, hypertension, obesity brought in by EMS for increased confusion and shortness of breath for the past few days.  Daughter said that patient has been having increased cough with wheezing.  Has had her COVID vaccine.  History otherwise limited due to medical condition      * No surgery found *      Hospital Course:   12/24 Given antibiotics, admit to floor    12/27/21 [facility] patient is lethargic, complaining of butt pain. Patient mental status and not at baseline. Medication review completed and medications adjusted. Will get daily labs and chest xray this am and monitor.     12/28/21 [facility] patient is more awake and alert this am, still lethargic and falls asleep easily but improvement from yesterday. Daughter is at bedside updated on patients condition. She reports that patient has intermittent abdominal pain will get xray today     12/29/21 [facility] patient's mental status is close to baseline and denies any pain or discomfort at this time. Will continue treatment, await final cultures, continue PT    12/30/21 [facility] Patient mental status is back to baseline. She is off of oxygen, will continue with nutrition and PT    12/31/21 CG: Doing well. Mentation is okay. Complains of sores on her bottom    1/1/22 CG: Slowly improving. NO acute overnight events. Labs unremarkable.     1/2/22 CG: No acute overnight events. Labs stable.        Goals of Care Treatment Preferences:  Code Status: Full Code      Consults:     * Encephalopathy  12/27/21 [facility]: patient is lethargic this am and encephalopathy is  likely multifactorial will continue to treat for UTI with Rocephin and await final urine culture, get ABG today, medications adjusted and lyrica was held due to new medication. Continue to monitor.     12/28/21 [facility] slight improvement continue treatment and monitor    12/29/21 [facility] mental status close to baseline    12/30/21 [facility] resolved    12/31/21 [facility] Resolved     Protein malnutrition  Add glucerna and encourage po intake      Abdominal pain  Get kub    12/29/21 [facility] see kub above, abdominal pain resolved    12/30/21 [facility] continue amitaza likely secondary to constipation    Acute respiratory failure with hypoxia and hypercarbia  Patient with Hypercapnic and Hypoxic Respiratory failure which is Acute.  she is not on home oxygen. Supplemental oxygen was provided and noted-  .   Signs/symptoms of respiratory failure include- tachypnea and increased work of breathing. Contributing diagnoses includes - Pleural effusion and Pneumonia Labs and images were reviewed. Patient Has recent ABG, which has been reviewed. Will treat underlying causes and adjust management of respiratory failure as follows-     Continue monitoring oxygen, intermittent bipap and qhs     12/29/21 [facility] continue to wean oxygen as tolerates    12/30/21 [facility] patient on room air    DVT prophylaxis  lovenox       Hyperkalemia  Stop spironolactone and start lasix and monitor labs daily    12/28/21 [facility] continue lokelma, add low dose ivf and continue to monitor.     12/29/21 [facility] labs improving continue meds and continue to monitor    12/30/21 [facility] resolved with medications will stop lokelma and monitor.     1/3/22 [facility] stable check labs in one week     Acute cystitis  IV antibiotics  Blood cultures pending    12/27/21 [facility] get blood cultures, continue rocephin and await final urine culture    12/28/21 [facility] prelim urine shows no growth await final continue rocephin    12/29/21 [facility] continue rocephin await final cultures    12/30/21 [facility]  Culture shows no growth    1/2/22  CG: IV Rocephin switched to oral Cipro    1/3/22 [facility] cipro for discharge x 5 days    Pleural effusion    IV diuresis, will watch her urine output closely    12/27/21 [facility] get chest xray today, stop spironolactone and start lasix due to elevated potassium, continue oxygen get abg and monitor    12/28/21 [facility] see chest xray above, continue lasix and bipap intermittent and q hs    12/29/21 [facility] improving with lasix, continue to monitor.     12/30/21 [facility] stable    1/3/22 [facility] lasix for discharge and monitor.       Final Active Diagnoses:    Diagnosis Date Noted POA    PRINCIPAL PROBLEM:  Encephalopathy [G93.40] 12/24/2021 Unknown    Acute respiratory failure with hypoxia and hypercarbia [J96.01, J96.02] 12/28/2021 Yes    Abdominal pain [R10.9] 12/28/2021 Yes    Protein malnutrition [E46] 12/28/2021 Yes    Hyperkalemia [E87.5] 12/27/2021 Yes    DVT prophylaxis [Z29.9] 12/27/2021 Not Applicable    Pleural effusion [J90] 12/24/2021 Unknown    Acute cystitis [N30.00] 12/24/2021 Unknown      Problems Resolved During this Admission:       Discharged Condition: good    Disposition: Skilled Nursing Facility    Follow Up:   Follow-up Information     Jo Underwood MD In 1 week.    Specialty: Internal Medicine  Why: follow up at [facility], hospital follow up  Contact information:  [address] [place] [address] [phone]                       Patient Instructions:      Comprehensive metabolic panel   Standing Status: Future Standing Exp. Date: 02/03/22     CBC auto differential   Standing Status: Future Standing Exp. Date: 02/03/22     Magnesium   Standing Status: Future Standing Exp. Date: 02/03/22     Diet diabetic     Notify your health care provider if you experience any of the following:  temperature >100.4     Notify your health care provider if you experience any of the following:  persistent nausea and vomiting or diarrhea     Notify your health care provider if you experience any of the following:   severe uncontrolled pain     Notify your health care provider if you experience any of the following:  redness, tenderness, or signs of infection (pain, swelling, redness, odor or green/yellow discharge around incision site)     Notify your health care provider if you experience any of the following:  difficulty breathing or increased cough     Notify your health care provider if you experience any of the following:  severe persistent headache     Notify your health care provider if you experience any of the following:  worsening rash     Notify your health care provider if you experience any of the following:  persistent dizziness, light-headedness, or visual disturbances     Notify your health care provider if you experience any of the following:  increased confusion or weakness     Notify your health care provider if you experience any of the following:     Activity as tolerated       Significant Diagnostic Studies: Labs:   CMP   Recent Labs   Lab 01/02/22 0311 01/03/22 0328    142   K 3.9 3.9    105   CO2 30* 30*   * 230*   BUN 35* 38*   CREATININE 1.1 1.1   CALCIUM 8.5* 8.5*   PROT 5.5* 5.8*   ALBUMIN 2.4* 2.5*   BILITOT 0.4 0.4   ALKPHOS 71 78   AST 15 37   ALT 23 49*   ANIONGAP 6* 7*   ESTGFRAFRICA 53.3* 53.3*   EGFRNONAA 46.2* 46.2*    and CBC   Recent Labs   Lab 01/02/22 0311 01/02/22 0311 01/03/22 0328   WBC 5.50  --  6.01   HGB 10.3*  --  10.5*   HCT 33.4*   < > 33.5*   *  --  132*    < > = values in this interval not displayed.       Pending Diagnostic Studies:     None         Medications:  Reconciled Home Medications:      Medication List      START taking these medications    ciprofloxacin HCl 500 MG tablet  Commonly known as: CIPRO  Take 1 tablet (500 mg total) by mouth 2 (two) times a day. for 5 days        CONTINUE taking these medications    acetaminophen 325 MG tablet  Commonly known as: TYLENOL  Take 650 mg by mouth 2 (two) times daily.     albuterol 2.5 mg /3 mL  (0.083 %) nebulizer solution  Commonly known as: PROVENTIL  Take by nebulization.     AMITIZA 8 MCG Cap  Generic drug: lubiprostone  Take 8 mcg by mouth 2 (two) times daily with meals.     aspirin 81 MG EC tablet  Commonly known as: ECOTRIN  Take 81 mg by mouth once daily.     baclofen 10 MG tablet  Commonly known as: LIORESAL  Take 10 mg by mouth 3 (three) times daily.     bisacodyL 5 mg EC tablet  Commonly known as: DULCOLAX  Take 5 mg by mouth daily as needed for Constipation.     donepeziL 10 MG tablet  Commonly known as: ARICEPT  Take 10 mg by mouth every evening.     fentaNYL 50 mcg/hr  Commonly known as: DURAGESIC  Place 1 patch onto the skin every 72 hours.     fluticasone propionate 50 mcg/actuation nasal spray  Commonly known as: FLONASE  1 spray by Each Nostril route once daily.     furosemide 40 MG tablet  Commonly known as: LASIX  Take 40 mg by mouth 2 (two) times daily.     guaiFENesin 100 mg/5 ml 100 mg/5 mL syrup  Commonly known as: ROBITUSSIN  Take 200 mg by mouth 3 (three) times daily as needed for Cough.     insulin lispro 100 unit/mL injection  Inject into the skin 3 (three) times daily before meals.     LANTUS SUBQ  Inject into the skin.     levothyroxine 125 MCG tablet  Commonly known as: SYNTHROID  Take 125 mcg by mouth before breakfast.     loperamide 2 mg Tab  Commonly known as: IMODIUM A-D  Take 2 mg by mouth 4 (four) times daily as needed.     mirtazapine 7.5 MG Tab  Commonly known as: REMERON  Take 7.5 mg by mouth every evening.     nitrofurantoin 50 MG capsule  Commonly known as: MACRODANTIN  Take 50 mg by mouth Daily.     oxyCODONE-acetaminophen 5-325 mg per tablet  Commonly known as: PERCOCET  Take 1 tablet by mouth every 4 (four) hours as needed for Pain.     pantoprazole 40 MG tablet  Commonly known as: PROTONIX  Take 40 mg by mouth once daily.     pramipexole 1 MG tablet  Commonly known as: MIRAPEX  Take 1 mg by mouth 3 (three) times daily.     sertraline 50 MG tablet  Commonly  known as: ZOLOFT  Take 50 mg by mouth once daily.     simvastatin 20 MG tablet  Commonly known as: ZOCOR  Take 20 mg by mouth every evening.        STOP taking these medications    amoxicillin 875 MG tablet  Commonly known as: AMOXIL     pregabalin 50 MG capsule  Commonly known as: LYRICA     spironolactone 50 MG tablet  Commonly known as: ALDACTONE     valACYclovir 500 MG tablet  Commonly known as: VALTREX            Indwelling Lines/Drains at time of discharge:   Lines/Drains/Airways     None                 Time spent on the discharge of patient: 45 minutes         Bibi Strange NP  Department of Hospital Medicine  [facility] Surg

## 2022-01-03 NOTE — PLAN OF CARE
[facility] - Med Surg  Discharge Final Note    Primary Care Provider: Jo Underwood MD    Expected Discharge Date: 1/3/2022    Final Discharge Note (most recent)     Final Note - 01/03/22 1013        Final Note    Assessment Type Final Discharge Note     Anticipated Discharge Disposition Skilled Nursing Facility        Post-Acute Status    Discharge Delays None known at this time                 Important Message from Medicare  Important Message from Medicare regarding Discharge Appeal Rights: Given to patient/caregiver,Explained to patient/caregiver,Other (comments) (Verbal consent from patient's daughterKenzie.)     Date IMM was signed: 01/02/22  Time IMM was signed: 1207    Contact Info     Jo Underwood MD   Specialty: Internal Medicine   Relationship: PCP - General    [address] [place] [id number]   Phone: [phone]       Next Steps: Follow up in 1 week(s)    Instructions: follow up at [facility], hospital follow up      Patient is discharging to [facility], SNF   Faxed discharge ordrs and summary.

## 2022-01-03 NOTE — ASSESSMENT & PLAN NOTE
Stop spironolactone and start lasix and monitor labs daily    12/28/21 [facility] continue lokelma, add low dose ivf and continue to monitor.     12/29/21 [facility] labs improving continue meds and continue to monitor    12/30/21 [facility] resolved with medications will stop lokelma and monitor.     1/3/22 [facility] stable check labs in one week

## 2022-01-03 NOTE — NURSING
Gave report to CONSTANCE Mina at [facility], transportation being sent to pick patient up for discharge.

## 2022-01-03 NOTE — PLAN OF CARE
Spoke to [facility], Informd Iliana Beth, patient is being discharged back to [facility] today.  Faxed discharge orders and discharge summary.

## 2022-01-03 NOTE — NURSING
Received report on patient, assessed patient, no distress noted, denies any pain or discomfort at present, Bed low, call bell in reach, side rails up x2. Instructed to call with any needs or for assistance.

## 2022-01-04 NOTE — PHYSICIAN QUERY
PT Name: Laisha Acosta  MR #: [id number]     DOCUMENTATION CLARIFICATION      CDS/: MARY ELLEN Graves, RN, CDS               Contact information:[email or web address]     This form is a permanent document in the medical record.     Query Date: January 4, 2022    Dear Provider,  By submitting this query, we are merely seeking further clarification of documentation.  Please utilize your independent clinical judgment when addressing the question(s) below.     The Medical Record contains the following:    Supporting Clinical Findings Location in Medical Record   Afebrile and vitals stable.  Saturating 94 95% on room air.  Will get labs and imaging to rule out pneumonia,    Acute respiratory failure with hypoxia and hypercapnia  Contributing diagnoses includes - Pleural effusion and Pneumonia     Continue monitoring oxygen, intermittent bipap and qhs     Vital Signs (24h Range):  Temp:  [97.9 °F (36.6 °C)-98.7 °F (37.1 °C)] 97.9 °F (36.6 °C)  Pulse:  [65-85] 78  Resp:  [16-20] 20  SpO2:  [95 %-100 %] 96 %  BP: (125-157)/(56-67) 131/60    Brought in by EMS for increased confusion and shortness of breath for the past few days.  Daughter said that patient has been having increased cough with wheezing    Contributing diagnoses includes - Pleural effusion and Pneumonia    Resp:  [14-20] 18  SpO2:  [92 %-98 %] 94 %     ED, Dr. Tariq, 12/24      , PATRIZIA Strange, NP/Dr. Underwood, 12/28                        , Dr. Macias, 1/2 12/24 12/25 12/27 12/28 12/29 12/30/ 12/31 1/1 1/2   WBC 7.03 7.38 10.82 6.64 5.70 6.08 6.67 6.34 5.50        12/25   Procalcitonin 0.10        12/24   Lactate, Todd 1.0        Lab 12/24-1/2        Lab 12/25        Lab 12/24   CXR impression:  Bibasilar pleuroparenchymal opacities suspicious for effusions with adjacent atelectasis versus pneumonia.    CXR impression:  Diffuse mild heterogeneous opacification of the right lung, may reflect edema or pneumonia.   Small right pleural effusion.    CXR  impression:    1. Improved aeration in the right lung with some residual mild edema in the right mid and right lower lung zones.  2. Interval decrease of right pleural effusion with a small amount of right pleural fluid remaining.   CXR 12/24        CXR 12/28          CXR 12/29   Ceftriaxone 1 g IV Q 24 hours   MAR 12/24-1/2     Please clarify if the Pneumonia diagnosis has been:    [ x ] Ruled In   [  ] Ruled Out   [  ] Other/Clarification of findings (please specify): _______________    [   ] Clinically undetermined           Please document in your progress notes daily for the duration of treatment, until resolved, and include in your discharge summary.    Form No. [id number]

## 2022-01-06 ENCOUNTER — LAB VISIT (OUTPATIENT)
Dept: LAB | Facility: HOSPITAL | Age: 87
End: 2022-01-06
Attending: INTERNAL MEDICINE
Payer: MEDICAID

## 2022-01-06 ENCOUNTER — PATIENT OUTREACH (OUTPATIENT)
Dept: ADMINISTRATIVE | Facility: CLINIC | Age: 87
End: 2022-01-06
Payer: MEDICAID

## 2022-01-06 DIAGNOSIS — R68.89 MECHANICAL AND MOTOR PROBLEMS WITH INTERNAL ORGANS: Primary | ICD-10-CM

## 2022-01-06 LAB
ALBUMIN SERPL BCP-MCNC: 3.1 G/DL (ref 3.5–5.2)
ALP SERPL-CCNC: 93 U/L (ref 55–135)
ALT SERPL W/O P-5'-P-CCNC: 44 U/L (ref 10–44)
ANION GAP SERPL CALC-SCNC: 7 MMOL/L (ref 8–16)
AST SERPL-CCNC: 20 U/L (ref 10–40)
BASOPHILS # BLD AUTO: 0.06 K/UL (ref 0–0.2)
BASOPHILS NFR BLD: 0.8 % (ref 0–1.9)
BILIRUB SERPL-MCNC: 0.6 MG/DL (ref 0.1–1)
BUN SERPL-MCNC: 49 MG/DL (ref 8–23)
CALCIUM SERPL-MCNC: 8.8 MG/DL (ref 8.7–10.5)
CHLORIDE SERPL-SCNC: 105 MMOL/L (ref 95–110)
CO2 SERPL-SCNC: 31 MMOL/L (ref 23–29)
CREAT SERPL-MCNC: 1.4 MG/DL (ref 0.5–1.4)
DIFFERENTIAL METHOD: ABNORMAL
EOSINOPHIL # BLD AUTO: 0.2 K/UL (ref 0–0.5)
EOSINOPHIL NFR BLD: 2.6 % (ref 0–8)
ERYTHROCYTE [DISTWIDTH] IN BLOOD BY AUTOMATED COUNT: 13.4 % (ref 11.5–14.5)
EST. GFR  (AFRICAN AMERICAN): 39.8 ML/MIN/1.73 M^2
EST. GFR  (NON AFRICAN AMERICAN): 34.5 ML/MIN/1.73 M^2
GLUCOSE SERPL-MCNC: 257 MG/DL (ref 70–110)
HCT VFR BLD AUTO: 36.2 % (ref 37–48.5)
HGB BLD-MCNC: 11.4 G/DL (ref 12–16)
IMM GRANULOCYTES # BLD AUTO: 0.06 K/UL (ref 0–0.04)
IMM GRANULOCYTES NFR BLD AUTO: 0.8 % (ref 0–0.5)
LYMPHOCYTES # BLD AUTO: 1.4 K/UL (ref 1–4.8)
LYMPHOCYTES NFR BLD: 18.3 % (ref 18–48)
MAGNESIUM SERPL-MCNC: 1.9 MG/DL (ref 1.6–2.6)
MCH RBC QN AUTO: 30 PG (ref 27–31)
MCHC RBC AUTO-ENTMCNC: 31.5 G/DL (ref 32–36)
MCV RBC AUTO: 95 FL (ref 82–98)
MONOCYTES # BLD AUTO: 0.8 K/UL (ref 0.3–1)
MONOCYTES NFR BLD: 10.6 % (ref 4–15)
NEUTROPHILS # BLD AUTO: 5.1 K/UL (ref 1.8–7.7)
NEUTROPHILS NFR BLD: 66.9 % (ref 38–73)
NRBC BLD-RTO: 0 /100 WBC
PLATELET # BLD AUTO: 196 K/UL (ref 150–450)
PMV BLD AUTO: 13.1 FL (ref 9.2–12.9)
POTASSIUM SERPL-SCNC: 4.6 MMOL/L (ref 3.5–5.1)
PROT SERPL-MCNC: 5.9 G/DL (ref 6–8.4)
RBC # BLD AUTO: 3.8 M/UL (ref 4–5.4)
SODIUM SERPL-SCNC: 143 MMOL/L (ref 136–145)
WBC # BLD AUTO: 7.56 K/UL (ref 3.9–12.7)

## 2022-01-06 PROCEDURE — 36415 COLL VENOUS BLD VENIPUNCTURE: CPT | Performed by: INTERNAL MEDICINE

## 2022-01-06 PROCEDURE — 80053 COMPREHEN METABOLIC PANEL: CPT | Performed by: INTERNAL MEDICINE

## 2022-01-06 PROCEDURE — 85025 COMPLETE CBC W/AUTO DIFF WBC: CPT | Mod: GA | Performed by: INTERNAL MEDICINE

## 2022-01-06 PROCEDURE — 83735 ASSAY OF MAGNESIUM: CPT | Performed by: INTERNAL MEDICINE

## 2022-06-10 NOTE — ED PROVIDER NOTES
Encounter Date: 6/10/2022       History     Chief Complaint   Patient presents with    GI Problem     Per nursing home, pt had 1 episode of dark red / loose stool. Asymptomatic.      86-year-old female not on blood thinner nursing home patient brought in for 1 episode of bright red blood per rectum.  Patient has not had a colonoscopy in over 20 years.  Patient has not had ever had similar episodes in the past.  Patient mentioned that she has been having diarrhea.  Denies any abdominal pain, blood thinner use, rectal pain        Review of patient's allergies indicates:   Allergen Reactions    Codeine      Past Medical History:   Diagnosis Date    Encephalopathy 12/24/2021     No past surgical history on file.  No family history on file.     Review of Systems   Constitutional: Negative.    HENT: Negative.    Respiratory: Negative.    Cardiovascular: Negative.    Gastrointestinal: Positive for blood in stool.   Genitourinary: Negative.    Musculoskeletal: Negative.    Skin: Negative.    Neurological: Positive for weakness.   Psychiatric/Behavioral: Negative.    All other systems reviewed and are negative.      Physical Exam     Initial Vitals [06/10/22 1251]   BP Pulse Resp Temp SpO2   136/69 77 18 98.2 °F (36.8 °C) 96 %      MAP       --         Physical Exam    Nursing note and vitals reviewed.  Constitutional: Vital signs are normal. She appears well-developed and well-nourished.   HENT:   Head: Normocephalic and atraumatic.   Eyes: Conjunctivae and lids are normal.   Neck: Trachea normal. Neck supple.   Cardiovascular: Normal rate, regular rhythm, normal heart sounds, intact distal pulses and normal pulses.   No murmur heard.  Pulmonary/Chest: Breath sounds normal. No respiratory distress.   Abdominal: Abdomen is soft. Bowel sounds are normal.   Genitourinary:    Genitourinary Comments: Bright red blood per rectum     Musculoskeletal:         General: Normal range of motion.      Cervical back: Neck supple.      Neurological: She is alert and oriented to person, place, and time. She has normal strength. No cranial nerve deficit or sensory deficit.   Skin: Skin is warm. Capillary refill takes less than 2 seconds.   Psychiatric: She has a normal mood and affect. Her speech is normal. Thought content normal.         ED Course   Procedures  Labs Reviewed   CBC W/ AUTO DIFFERENTIAL - Abnormal; Notable for the following components:       Result Value    MCHC 31.8 (*)     Lymph % 17.5 (*)     All other components within normal limits   COMPREHENSIVE METABOLIC PANEL - Abnormal; Notable for the following components:    Glucose 317 (*)     BUN 43 (*)     Albumin 3.2 (*)     Anion Gap 6 (*)     eGFR if  59.8 (*)     eGFR if non  51.9 (*)     All other components within normal limits   OCCULT BLOOD X 1, STOOL - Abnormal; Notable for the following components:    Occult Blood Positive (*)     All other components within normal limits   PROTIME-INR   APTT   SARS-COV-2 RDRP GENE   TYPE & SCREEN          Imaging Results    None          Medications - No data to display  Medical Decision Making:   Initial Assessment:   86-year-old female not on blood thinner nursing home patient brought in for 1 episode of bright red blood per rectum.  Afebrile vitals stable.  Physical noted.  Will get labs and imaging to determined patient need transfusion.  Abdomen soft non peritonitic.  Re-evaluate  Clinical Tests:   Lab Tests: Ordered and Reviewed  The following lab test(s) were unremarkable: CBC, CMP, PT and PTT             ED Course as of 06/10/22 1531   Fri Ric 10, 2022   1524 Labs imaging noted.  Will admit patient for observation for hemoglobin recheck.  Consult surgery.  Abdomen remains soft non peritonitic [HD]      ED Course User Index  [HD] Cullen Tariq MD             Clinical Impression:   Final diagnoses:  [K92.2] Gastrointestinal hemorrhage, unspecified gastrointestinal hemorrhage type (Primary)           ED Disposition Condition    Admit               Cullen Tariq MD  06/10/22 7866

## 2022-06-11 PROBLEM — E11.65 TYPE 2 DIABETES MELLITUS WITH HYPERGLYCEMIA: Status: ACTIVE | Noted: 2022-01-01

## 2022-06-11 PROBLEM — K92.2 GASTROINTESTINAL HEMORRHAGE: Status: ACTIVE | Noted: 2022-01-01

## 2022-06-11 PROBLEM — E11.65 TYPE 2 DIABETES MELLITUS WITH HYPERGLYCEMIA: Status: RESOLVED | Noted: 2022-01-01 | Resolved: 2022-01-01

## 2022-06-11 PROBLEM — F41.8 DEPRESSION WITH ANXIETY: Status: ACTIVE | Noted: 2022-01-01

## 2022-06-11 PROBLEM — R41.81 AGE-RELATED COGNITIVE DECLINE: Status: ACTIVE | Noted: 2022-01-01

## 2022-06-11 PROBLEM — E03.8 OTHER SPECIFIED HYPOTHYROIDISM: Status: ACTIVE | Noted: 2022-01-01

## 2022-06-11 PROBLEM — F41.8 DEPRESSION WITH ANXIETY: Status: RESOLVED | Noted: 2022-01-01 | Resolved: 2022-01-01

## 2022-06-11 NOTE — PLAN OF CARE
[facility]  Initial Discharge Assessment       Primary Care Provider: Jo Underwood MD    Admission Diagnosis: Gastrointestinal hemorrhage, unspecified gastrointestinal hemorrhage type [K92.2]    Admission Date: 6/10/2022  Expected Discharge Date:     Discharge Barriers Identified: None    Payor: MEDICARE / Plan: MEDICARE PART A & B / Product Type: Government /     Extended Emergency Contact Information  Primary Emergency Contact: SAUL MESA  Mobile Phone: [phone]  Relation: Daughter  Preferred language: English  Secondary Emergency Contact: DONAVAN FERGUSON  Mobile Phone: [phone]  Relation: Daughter  Preferred language: English       Discharge Plan B: Skilled Nursing Facility      [facility] [place], [place] - [address]  [address]  [place] [address]  Phone: [phone] Fax: [phone]    [id number] [address] [place] [address] [address] [facility]  [address] [address]  Phone: [phone] Fax: [phone]      Initial Assessment (most recent)     Adult Discharge Assessment - 06/11/22 1114        Discharge Assessment    Assessment Type Discharge Planning Assessment     Confirmed/corrected address, phone number and insurance Yes     Source of Information patient     Does patient/caregiver understand observation status Yes     Communicated KIANA with patient/caregiver No     Reason For Admission Rectal bleeding     Lives With other (see comments)   currently at [facility].    Facility Arrived From: [facility]     Do you expect to return to your current living situation? Yes     Prior to hospitilization cognitive status: Alert/Oriented     Current cognitive status: Alert/Oriented     Walking or Climbing Stairs Difficulty ambulation difficulty, dependent;stair climbing difficulty, dependent;transferring difficulty, assistance 1 person     Mobility Management States staff  at nursing home helps her get from bed to wheelchair.     Dressing/Bathing Difficulty bathing difficulty, dependent;dressing difficulty, assistance 1 person     Equipment Currently Used at Home wheelchair     Readmission within 30 days? No     Patient currently being followed by outpatient case management? No     Do you have any problems affording any of your prescribed medications? TBD     Is the patient taking medications as prescribed? yes     Who is going to help you get home at discharge? SNF     How do you get to doctors appointments? agency     Are you on dialysis? No     Discharge Plan B Skilled Nursing Facility     DME Needed Upon Discharge  none     Discharge Plan discussed with: Patient     Discharge Barriers Identified None               Patient is awake alert, oriented for this interview.  States she has been at [facility] so she can get stronger and return to her home.  Plans to return to [facility] when discharge from hospital is appropriate.  [facility] does not have LW or POA States to call her daughters is in case of emergency.  She verifies contact information for two daughters., Kenzie Guido, and Justyna Perezmayda. Current level of activity according to patient, she needs assistance to transfer from bed to wheelchair.  Patient feeds self and needs partial assistance for dressing, requires two people to assist with bathing.  States she currently has wheelchair at SNF.

## 2022-06-11 NOTE — PLAN OF CARE
06/11/22 0841   Medicare Message   Important Message from Medicare regarding Discharge Appeal Rights Signed/date by patient/caregiver   Date IMM was signed 06/10/22   Time IMM was signed 1536   IM was obtained per admitting services.

## 2022-06-11 NOTE — SUBJECTIVE & OBJECTIVE
Past Medical History:   Diagnosis Date    Encephalopathy 12/24/2021     History reviewed. No pertinent surgical history.    Review of patient's allergies indicates:   Allergen Reactions    Codeine        No current facility-administered medications on file prior to encounter.     Current Outpatient Medications on File Prior to Encounter   Medication Sig    acetaminophen (TYLENOL) 325 MG tablet Take 650 mg by mouth 2 (two) times daily.    albuterol-ipratropium (DUO-NEB) 2.5 mg-0.5 mg/3 mL nebulizer solution Take 3 mLs by nebulization every 6 (six) hours as needed for Wheezing. Rescue    aspirin (ECOTRIN) 81 MG EC tablet Take 81 mg by mouth once daily.    bisacodyL (DULCOLAX) 5 mg EC tablet Take 5 mg by mouth daily as needed for Constipation.    cyanocobalamin 1,000 mcg/mL injection 1,000 mcg.    donepeziL (ARICEPT) 10 MG tablet Take 10 mg by mouth every evening.    fentaNYL (DURAGESIC) 50 mcg/hr Place 1 patch onto the skin every 72 hours.    fluticasone propionate (FLONASE) 50 mcg/actuation nasal spray 1 spray by Each Nostril route once daily.    furosemide (LASIX) 40 MG tablet Take 40 mg by mouth 2 (two) times daily.    gabapentin (NEURONTIN) 100 MG capsule Take 100 mg by mouth every evening.    guaiFENesin 100 mg/5 ml (ROBITUSSIN) 100 mg/5 mL syrup Take 200 mg by mouth 3 (three) times daily as needed for Cough.    ibuprofen (ADVIL,MOTRIN) 400 MG tablet Take 400 mg by mouth every 8 (eight) hours as needed for Other.    insulin glargine,hum.rec.anlog (LANTUS SUBQ) Inject into the skin.    insulin lispro 100 unit/mL injection Inject into the skin 3 (three) times daily before meals.    levothyroxine (SYNTHROID) 125 MCG tablet Take 125 mcg by mouth before breakfast.    lubiprostone (AMITIZA) 8 MCG Cap Take 8 mcg by mouth 2 (two) times daily with meals.    nitrofurantoin (MACRODANTIN) 50 MG capsule Take 50 mg by mouth Daily.    pantoprazole (PROTONIX) 40 MG tablet Take 40 mg by mouth once daily.    simvastatin (ZOCOR) 20  MG tablet Take 20 mg by mouth every evening.    venlafaxine (EFFEXOR-XR) 75 MG 24 hr capsule Take 75 mg by mouth once daily.    albuterol (PROVENTIL) 2.5 mg /3 mL (0.083 %) nebulizer solution Take by nebulization.    baclofen (LIORESAL) 10 MG tablet Take 10 mg by mouth 3 (three) times daily.    loperamide (IMODIUM A-D) 2 mg Tab Take 2 mg by mouth 4 (four) times daily as needed.    mirtazapine (REMERON) 7.5 MG Tab Take 7.5 mg by mouth every evening.    oxyCODONE-acetaminophen (PERCOCET) 5-325 mg per tablet Take 1 tablet by mouth every 4 (four) hours as needed for Pain.    pramipexole (MIRAPEX) 1 MG tablet Take 1 mg by mouth 3 (three) times daily.    sertraline (ZOLOFT) 50 MG tablet Take 50 mg by mouth once daily.     Family History    None       Tobacco Use    Smoking status: Not on file    Smokeless tobacco: Not on file   Substance and Sexual Activity    Alcohol use: Not on file    Drug use: Not on file    Sexual activity: Not on file     Review of Systems   Constitutional:  Negative for activity change, appetite change, chills, diaphoresis, fatigue, fever and unexpected weight change.   Respiratory:  Negative for cough, chest tightness and shortness of breath.    Cardiovascular:  Negative for chest pain, palpitations and leg swelling.   Gastrointestinal:  Positive for blood in stool. Negative for abdominal distention, abdominal pain, constipation, diarrhea, nausea, rectal pain and vomiting.   Musculoskeletal:  Positive for arthralgias and gait problem. Negative for back pain, neck pain and neck stiffness.   Skin: Negative.    Neurological:  Negative for tremors, seizures, syncope, weakness, light-headedness, numbness and headaches.   Psychiatric/Behavioral:  Negative for agitation, behavioral problems and confusion.    All other systems reviewed and are negative.  Objective:     Vital Signs (Most Recent):  Temp: 98.1 °F (36.7 °C) (06/11/22 0727)  Pulse: 76 (06/11/22 0727)  Resp: (!) 22 (06/11/22 0727)  BP: (!)  141/65 (06/11/22 0727)  SpO2: 95 % (06/11/22 0727)   Vital Signs (24h Range):  Temp:  [96.1 °F (35.6 °C)-98.2 °F (36.8 °C)] 98.1 °F (36.7 °C)  Pulse:  [66-98] 76  Resp:  [16-22] 22  SpO2:  [95 %-97 %] 95 %  BP: (135-168)/(65-72) 141/65     Weight: 90.7 kg (200 lb)  Body mass index is 35.43 kg/m².    Physical Exam  Vitals reviewed.   Constitutional:       General: She is not in acute distress.     Appearance: She is not ill-appearing or toxic-appearing.   HENT:      Right Ear: External ear normal.      Left Ear: External ear normal.      Mouth/Throat:      Mouth: Mucous membranes are dry.      Comments: edentulous  Eyes:      Extraocular Movements: Extraocular movements intact.   Cardiovascular:      Rate and Rhythm: Normal rate and regular rhythm.      Pulses: Normal pulses.      Heart sounds: Normal heart sounds. No murmur heard.  Pulmonary:      Effort: Pulmonary effort is normal. No respiratory distress.      Breath sounds: No wheezing or rales.   Abdominal:      General: Bowel sounds are normal. There is no distension.      Palpations: Abdomen is soft.      Tenderness: There is no abdominal tenderness. There is no right CVA tenderness, left CVA tenderness, guarding or rebound.   Genitourinary:     Comments: Grade II internal hemorrhoids   Scant amount of melena in rectal vault   Musculoskeletal:         General: No swelling.      Cervical back: Normal range of motion and neck supple.      Right lower leg: No edema.      Left lower leg: No edema.      Comments: Right leg contraction, flexed, at hip and knees, hip externally rotated. Left leg with strength and reduced ROM.   Skin:     General: Skin is warm and dry.      Capillary Refill: Capillary refill takes less than 2 seconds.      Findings: No erythema, lesion or rash.   Neurological:      General: No focal deficit present.      Mental Status: She is alert and oriented to person, place, and time. Mental status is at baseline.   Psychiatric:         Mood and  Affect: Mood normal.         Behavior: Behavior normal.         Thought Content: Thought content normal.           Significant Labs: All pertinent labs within the past 24 hours have been reviewed.  Recent Labs   Lab 06/10/22  1353 06/11/22  0553   BILITOT 0.4 0.4     BMP:   Recent Labs   Lab 06/11/22  0553   *      K 3.8      CO2 29   BUN 36*   CREATININE 0.9   CALCIUM 8.4*     CBC:   Recent Labs   Lab 06/10/22  1820 06/11/22  0035 06/11/22  0553   WBC 6.14 6.43 6.03  6.03   HGB 10.9* 10.6* 10.3*  10.3*   HCT 33.7* 32.6* 32.3*  32.3*    222 227  227     CMP:   Recent Labs   Lab 06/10/22  1353 06/11/22  0553    144   K 3.8 3.8    109   CO2 29 29   * 268*   BUN 43* 36*   CREATININE 1.0 0.9   CALCIUM 8.9 8.4*   PROT 6.8 5.8*   ALBUMIN 3.2* 2.7*   BILITOT 0.4 0.4   ALKPHOS 98 83   AST 11 7*   ALT 15 12   ANIONGAP 6* 6*   EGFRNONAA 51.9* 58.9*     Recent Labs   Lab 06/10/22  1353   INR 0.9   APTT 23.2

## 2022-06-11 NOTE — HPI
85yo female with PMH sig for hypothyroidism, Parkinson's, HDL, and DMII who presents from nursing home with episode of BRBPR.  Does not remember if she's ever had a colonoscopy.  Vaguely remembers distant history of diverticular bleed, but does not remember how it was treated.  Upon arrival, VSS; with Hgb 12.  Repeat labs this AM with Hgb 10 with an additional bloody bowel movement.  General surgery has been consulted for evaluation.

## 2022-06-11 NOTE — CONSULTS
[facility]  General Surgery  Consult Note    Patient Name: Laisha Acosta  MRN: [id number]  Code Status: Full Code  Admission Date: 6/10/2022  Hospital Length of Stay: 1 days  Attending Physician: Buster Nuñez III, MD  Primary Care Provider: Jo Underwood MD    Patient information was obtained from patient, past medical records and ER records.     Inpatient consult to General Surgery  Consult performed by: Shante Rey MD  Consult ordered by: Real Patterson DO        Subjective:     Principal Problem: Gastrointestinal hemorrhage    History of Present Illness: 85yo female with PMH sig for hypothyroidism, Parkinson's, HDL, and DMII who presents from nursing home with episode of BRBPR.  Does not remember if she's ever had a colonoscopy.  Vaguely remembers distant history of diverticular bleed, but does not remember how it was treated.  Upon arrival, VSS; with Hgb 12.  Repeat labs this AM with Hgb 10 with an additional bloody bowel movement.  General surgery has been consulted for evaluation.       No current facility-administered medications on file prior to encounter.     Current Outpatient Medications on File Prior to Encounter   Medication Sig    acetaminophen (TYLENOL) 325 MG tablet Take 650 mg by mouth 2 (two) times daily.    albuterol-ipratropium (DUO-NEB) 2.5 mg-0.5 mg/3 mL nebulizer solution Take 3 mLs by nebulization every 6 (six) hours as needed for Wheezing. Rescue    aspirin (ECOTRIN) 81 MG EC tablet Take 81 mg by mouth once daily.    bisacodyL (DULCOLAX) 5 mg EC tablet Take 5 mg by mouth daily as needed for Constipation.    cyanocobalamin 1,000 mcg/mL injection 1,000 mcg.    donepeziL (ARICEPT) 10 MG tablet Take 10 mg by mouth every evening.    fentaNYL (DURAGESIC) 50 mcg/hr Place 1 patch onto the skin every 72 hours.    fluticasone propionate (FLONASE) 50 mcg/actuation nasal spray 1 spray by Each Nostril route once daily.    furosemide (LASIX) 40 MG tablet Take 40 mg  by mouth 2 (two) times daily.    gabapentin (NEURONTIN) 100 MG capsule Take 100 mg by mouth every evening.    guaiFENesin 100 mg/5 ml (ROBITUSSIN) 100 mg/5 mL syrup Take 200 mg by mouth 3 (three) times daily as needed for Cough.    ibuprofen (ADVIL,MOTRIN) 400 MG tablet Take 400 mg by mouth every 8 (eight) hours as needed for Other.    insulin glargine,hum.rec.anlog (LANTUS SUBQ) Inject into the skin.    insulin lispro 100 unit/mL injection Inject into the skin 3 (three) times daily before meals.    levothyroxine (SYNTHROID) 125 MCG tablet Take 125 mcg by mouth before breakfast.    lubiprostone (AMITIZA) 8 MCG Cap Take 8 mcg by mouth 2 (two) times daily with meals.    nitrofurantoin (MACRODANTIN) 50 MG capsule Take 50 mg by mouth Daily.    pantoprazole (PROTONIX) 40 MG tablet Take 40 mg by mouth once daily.    simvastatin (ZOCOR) 20 MG tablet Take 20 mg by mouth every evening.    venlafaxine (EFFEXOR-XR) 75 MG 24 hr capsule Take 75 mg by mouth once daily.    albuterol (PROVENTIL) 2.5 mg /3 mL (0.083 %) nebulizer solution Take by nebulization.    baclofen (LIORESAL) 10 MG tablet Take 10 mg by mouth 3 (three) times daily.    loperamide (IMODIUM A-D) 2 mg Tab Take 2 mg by mouth 4 (four) times daily as needed.    mirtazapine (REMERON) 7.5 MG Tab Take 7.5 mg by mouth every evening.    oxyCODONE-acetaminophen (PERCOCET) 5-325 mg per tablet Take 1 tablet by mouth every 4 (four) hours as needed for Pain.    pramipexole (MIRAPEX) 1 MG tablet Take 1 mg by mouth 3 (three) times daily.    sertraline (ZOLOFT) 50 MG tablet Take 50 mg by mouth once daily.       Review of patient's allergies indicates:   Allergen Reactions    Codeine        Past Medical History:   Diagnosis Date    Encephalopathy 12/24/2021     History reviewed. No pertinent surgical history.  Family History    None       Tobacco Use    Smoking status: Not on file    Smokeless tobacco: Not on file   Substance and Sexual Activity    Alcohol  use: Not on file    Drug use: Not on file    Sexual activity: Not on file     Review of Systems   Constitutional:  Negative for activity change, appetite change, chills, diaphoresis, fatigue, fever and unexpected weight change.   Gastrointestinal:  Positive for blood in stool. Negative for abdominal distention, abdominal pain, constipation, diarrhea, nausea, rectal pain and vomiting.   All other systems reviewed and are negative.  Objective:     Vital Signs (Most Recent):  Temp: 98.1 °F (36.7 °C) (06/11/22 0727)  Pulse: 76 (06/11/22 0727)  Resp: (!) 22 (06/11/22 0727)  BP: (!) 141/65 (06/11/22 0727)  SpO2: 95 % (06/11/22 0727)   Vital Signs (24h Range):  Temp:  [96.1 °F (35.6 °C)-98.1 °F (36.7 °C)] 98.1 °F (36.7 °C)  Pulse:  [66-98] 76  Resp:  [16-22] 22  SpO2:  [95 %-97 %] 95 %  BP: (135-168)/(65-72) 141/65     Weight: 90.7 kg (200 lb)  Body mass index is 35.43 kg/m².    Physical Exam  Vitals reviewed.   Constitutional:       General: She is not in acute distress.     Appearance: She is not ill-appearing or toxic-appearing.   Cardiovascular:      Rate and Rhythm: Normal rate and regular rhythm.   Pulmonary:      Effort: Pulmonary effort is normal. No respiratory distress.   Abdominal:      General: Bowel sounds are normal. There is no distension.      Palpations: Abdomen is soft.      Tenderness: There is no abdominal tenderness. There is no guarding or rebound.   Genitourinary:     Comments: Grade II internal hemorrhoids   Scant amount of melena in rectal vault   Skin:     General: Skin is warm and dry.      Capillary Refill: Capillary refill takes less than 2 seconds.   Neurological:      Mental Status: She is alert. Mental status is at baseline.       Significant Labs:  I have reviewed all pertinent lab results within the past 24 hours.  CBC:   Recent Labs   Lab 06/11/22  1214   WBC 6.12   RBC 3.60*   HGB 10.5*   HCT 32.3*      MCV 90   MCH 29.2   MCHC 32.5     BMP:   Recent Labs   Lab 06/11/22  5807    *      K 3.8      CO2 29   BUN 36*   CREATININE 0.9   CALCIUM 8.4*       Significant Diagnostics:  I have reviewed all pertinent imaging results/findings within the past 24 hours.      Assessment/Plan:     * Gastrointestinal hemorrhage  -Tentatively to OR Monday for Colonoscopy   -CLD today   -Will speak with family for more details on past medical history   -Serial H/H  -Rest of care per primary       VTE Risk Mitigation (From admission, onward)         Ordered     IP VTE HIGH RISK PATIENT  Once         06/10/22 1602     Place sequential compression device  Until discontinued         06/10/22 1602                Thank you for your consult. I will follow-up with patient. Please contact us if you have any additional questions.    Shante Rey MD  General Surgery  [facility]

## 2022-06-11 NOTE — ASSESSMENT & PLAN NOTE
-Tentatively to OR Monday for Colonoscopy   -CLD today   -Will speak with family for more details on past medical history   -Serial H/H  -Rest of care per primary

## 2022-06-11 NOTE — ASSESSMENT & PLAN NOTE
Witnessed bright red blood on diaper at nursing home and another darkened stool early morning per nursing team here.   H/H has been trending, slowly from 12 to 10.6, 10.3.   Vital signs stable. Patient asymptomatic.   - surgery finding of hemorrhoids  - with continued drop in H/H, possible colonoscopy on Monday  - transfuse patient becomes symptomatic <7g/dL  - trend H/H Q8H

## 2022-06-11 NOTE — HPI
86 year old female history of dementia, diabetes mellitis type 2, hypertension, obesity brought in by EMS from [facility] with bright red blood found on her diaper. Patient is primarily bed bound and requires two persons assist to get out of bed and into wheel chair.   Patient denies symptoms, no headaches, no dizziness, shortness of breath, palpitations. Patient has not had a colonoscopy in over 20 years.   Hemoglobin of 12 in the ED has been trended and has dropped to 10.5.   Patient is at her baseline mentation. Denies symptoms.   Will continue to observe and trend H/H. Consulted surgery for possible colonoscopy.

## 2022-06-11 NOTE — PLAN OF CARE
06/11/22 1113   VALDIVIA Message   Medicare Outpatient and Observation Notification regarding financial responsibility Given to patient/caregiver;Explained to patient/caregiver;Signed/date by patient/caregiver   Date VALDIVIA was signed 06/11/22   Time VALDIVIA was signed 1113   Visited with patient at bedside.  Explained observation status to patient.  Reviewed MOON with patient.  She verbalizes understanding.  Patient signs MOON.  Copy left at bedside.

## 2022-06-11 NOTE — SUBJECTIVE & OBJECTIVE
No current facility-administered medications on file prior to encounter.     Current Outpatient Medications on File Prior to Encounter   Medication Sig    acetaminophen (TYLENOL) 325 MG tablet Take 650 mg by mouth 2 (two) times daily.    albuterol-ipratropium (DUO-NEB) 2.5 mg-0.5 mg/3 mL nebulizer solution Take 3 mLs by nebulization every 6 (six) hours as needed for Wheezing. Rescue    aspirin (ECOTRIN) 81 MG EC tablet Take 81 mg by mouth once daily.    bisacodyL (DULCOLAX) 5 mg EC tablet Take 5 mg by mouth daily as needed for Constipation.    cyanocobalamin 1,000 mcg/mL injection 1,000 mcg.    donepeziL (ARICEPT) 10 MG tablet Take 10 mg by mouth every evening.    fentaNYL (DURAGESIC) 50 mcg/hr Place 1 patch onto the skin every 72 hours.    fluticasone propionate (FLONASE) 50 mcg/actuation nasal spray 1 spray by Each Nostril route once daily.    furosemide (LASIX) 40 MG tablet Take 40 mg by mouth 2 (two) times daily.    gabapentin (NEURONTIN) 100 MG capsule Take 100 mg by mouth every evening.    guaiFENesin 100 mg/5 ml (ROBITUSSIN) 100 mg/5 mL syrup Take 200 mg by mouth 3 (three) times daily as needed for Cough.    ibuprofen (ADVIL,MOTRIN) 400 MG tablet Take 400 mg by mouth every 8 (eight) hours as needed for Other.    insulin glargine,hum.rec.anlog (LANTUS SUBQ) Inject into the skin.    insulin lispro 100 unit/mL injection Inject into the skin 3 (three) times daily before meals.    levothyroxine (SYNTHROID) 125 MCG tablet Take 125 mcg by mouth before breakfast.    lubiprostone (AMITIZA) 8 MCG Cap Take 8 mcg by mouth 2 (two) times daily with meals.    nitrofurantoin (MACRODANTIN) 50 MG capsule Take 50 mg by mouth Daily.    pantoprazole (PROTONIX) 40 MG tablet Take 40 mg by mouth once daily.    simvastatin (ZOCOR) 20 MG tablet Take 20 mg by mouth every evening.    venlafaxine (EFFEXOR-XR) 75 MG 24 hr capsule Take 75 mg by mouth once daily.    albuterol (PROVENTIL) 2.5 mg /3 mL (0.083 %) nebulizer solution Take by  nebulization.    baclofen (LIORESAL) 10 MG tablet Take 10 mg by mouth 3 (three) times daily.    loperamide (IMODIUM A-D) 2 mg Tab Take 2 mg by mouth 4 (four) times daily as needed.    mirtazapine (REMERON) 7.5 MG Tab Take 7.5 mg by mouth every evening.    oxyCODONE-acetaminophen (PERCOCET) 5-325 mg per tablet Take 1 tablet by mouth every 4 (four) hours as needed for Pain.    pramipexole (MIRAPEX) 1 MG tablet Take 1 mg by mouth 3 (three) times daily.    sertraline (ZOLOFT) 50 MG tablet Take 50 mg by mouth once daily.       Review of patient's allergies indicates:   Allergen Reactions    Codeine        Past Medical History:   Diagnosis Date    Encephalopathy 12/24/2021     History reviewed. No pertinent surgical history.  Family History    None       Tobacco Use    Smoking status: Not on file    Smokeless tobacco: Not on file   Substance and Sexual Activity    Alcohol use: Not on file    Drug use: Not on file    Sexual activity: Not on file     Review of Systems   Constitutional:  Negative for activity change, appetite change, chills, diaphoresis, fatigue, fever and unexpected weight change.   Gastrointestinal:  Positive for blood in stool. Negative for abdominal distention, abdominal pain, constipation, diarrhea, nausea, rectal pain and vomiting.   All other systems reviewed and are negative.  Objective:     Vital Signs (Most Recent):  Temp: 98.1 °F (36.7 °C) (06/11/22 0727)  Pulse: 76 (06/11/22 0727)  Resp: (!) 22 (06/11/22 0727)  BP: (!) 141/65 (06/11/22 0727)  SpO2: 95 % (06/11/22 0727)   Vital Signs (24h Range):  Temp:  [96.1 °F (35.6 °C)-98.1 °F (36.7 °C)] 98.1 °F (36.7 °C)  Pulse:  [66-98] 76  Resp:  [16-22] 22  SpO2:  [95 %-97 %] 95 %  BP: (135-168)/(65-72) 141/65     Weight: 90.7 kg (200 lb)  Body mass index is 35.43 kg/m².    Physical Exam  Vitals reviewed.   Constitutional:       General: She is not in acute distress.     Appearance: She is not ill-appearing or toxic-appearing.   Cardiovascular:      Rate  and Rhythm: Normal rate and regular rhythm.   Pulmonary:      Effort: Pulmonary effort is normal. No respiratory distress.   Abdominal:      General: Bowel sounds are normal. There is no distension.      Palpations: Abdomen is soft.      Tenderness: There is no abdominal tenderness. There is no guarding or rebound.   Genitourinary:     Comments: Grade II internal hemorrhoids   Scant amount of melena in rectal vault   Skin:     General: Skin is warm and dry.      Capillary Refill: Capillary refill takes less than 2 seconds.   Neurological:      Mental Status: She is alert. Mental status is at baseline.       Significant Labs:  I have reviewed all pertinent lab results within the past 24 hours.  CBC:   Recent Labs   Lab 06/11/22  1214   WBC 6.12   RBC 3.60*   HGB 10.5*   HCT 32.3*      MCV 90   MCH 29.2   MCHC 32.5     BMP:   Recent Labs   Lab 06/11/22  0553   *      K 3.8      CO2 29   BUN 36*   CREATININE 0.9   CALCIUM 8.4*       Significant Diagnostics:  I have reviewed all pertinent imaging results/findings within the past 24 hours.

## 2022-06-11 NOTE — NURSING
Pt had a medium dark/bright red stool with clots this am. Noted the blood possibly coming from vagina and rectum. Pt denies needs at this time will continue to monitor.

## 2022-06-12 NOTE — NURSING
Noted change in pt's behavior, she is not talking with staff like she was last night and this morning. Pt is c/o pain to her left arm, when asked if the pain was new pt said no it has hurt like this before. Pt was being slightly combative with PCT and Nurse while changing her. She told us to get out of here now! after swinging at the PCT and scratching/pinching the nurse. Finished cleaning pt and left out of her room explaining we will be back in to check on her. VSS, pt is currently sinus zenon on telemetry, H/H is stable will continue to monitor.

## 2022-06-12 NOTE — SUBJECTIVE & OBJECTIVE
Interval History:   Patient seen and examined.  Two bloody bowel movements overnight.  Hgb stable at 10.  Denies abdominal pain.      Medications:  Continuous Infusions:  Scheduled Meds:   atorvastatin  20 mg Oral Daily    baclofen  10 mg Oral TID    donepeziL  10 mg Oral QHS    famotidine (PF)  20 mg Intravenous BID    fentaNYL  1 patch Transdermal Q72H    fluticasone propionate  1 spray Each Nostril Daily    gabapentin  100 mg Oral QHS    levothyroxine  125 mcg Oral Before breakfast    pantoprazole  40 mg Intravenous Daily    pramipexole  1 mg Oral TID    sertraline  50 mg Oral Daily    sodium,potassium,mag sulfates   Oral BID     PRN Meds:acetaminophen, albuterol-ipratropium, melatonin, ondansetron, phenyleph-min oil-petrolatum, sodium chloride 0.9%     Review of patient's allergies indicates:   Allergen Reactions    Codeine      Objective:     Vital Signs (Most Recent):  Temp: 98.7 °F (37.1 °C) (06/12/22 0709)  Pulse: 85 (06/12/22 0709)  Resp: 20 (06/12/22 0709)  BP: (!) 146/60 (06/12/22 0709)  SpO2: (!) 94 % (06/12/22 0709)   Vital Signs (24h Range):  Temp:  [97 °F (36.1 °C)-98.7 °F (37.1 °C)] 98.7 °F (37.1 °C)  Pulse:  [60-95] 85  Resp:  [20] 20  SpO2:  [94 %-97 %] 94 %  BP: (144-188)/(60-78) 146/60     Weight: 90.7 kg (200 lb)  Body mass index is 35.43 kg/m².    Intake/Output - Last 3 Shifts         06/10 0700  06/11 0659 06/11 0700 06/12 0659 06/12 0700  06/13 0659    P.O. 240 600     Total Intake(mL/kg) 240 (2.6) 600 (6.6)     Urine (mL/kg/hr) 550      Total Output 550      Net -310 +600            Urine Occurrence 1 x 2 x     Stool Occurrence  5 x             Physical Exam  Constitutional:       General: She is not in acute distress.     Appearance: She is not ill-appearing or toxic-appearing.   Cardiovascular:      Rate and Rhythm: Normal rate and regular rhythm.   Pulmonary:      Effort: Pulmonary effort is normal. No respiratory distress.   Abdominal:      General: Abdomen is flat. There is no  distension.      Palpations: Abdomen is soft.      Tenderness: There is no abdominal tenderness. There is no guarding or rebound.   Musculoskeletal:      Right lower leg: No edema.      Left lower leg: No edema.   Skin:     Capillary Refill: Capillary refill takes less than 2 seconds.   Neurological:      Mental Status: She is alert. Mental status is at baseline.       Significant Labs:  I have reviewed all pertinent lab results within the past 24 hours.  CBC:   Recent Labs   Lab 06/12/22  0644   WBC 6.81   RBC 3.47*   HGB 10.1*   HCT 31.3*      MCV 90   MCH 29.1   MCHC 32.3     CMP:   Recent Labs   Lab 06/11/22  0553   *   CALCIUM 8.4*   ALBUMIN 2.7*   PROT 5.8*      K 3.8   CO2 29      BUN 36*   CREATININE 0.9   ALKPHOS 83   ALT 12   AST 7*   BILITOT 0.4       Significant Diagnostics:  I have reviewed all pertinent imaging results/findings within the past 24 hours.

## 2022-06-12 NOTE — PROGRESS NOTES
[facility]  General Surgery  Progress Note    Subjective:     History of Present Illness:  87yo female with PMH sig for hypothyroidism, Parkinson's, HDL, and DMII who presents from nursing home with episode of BRBPR.  Does not remember if she's ever had a colonoscopy.  Vaguely remembers distant history of diverticular bleed, but does not remember how it was treated.  Upon arrival, VSS; with Hgb 12.  Repeat labs this AM with Hgb 10 with an additional bloody bowel movement.  General surgery has been consulted for evaluation.       Post-Op Info:  Procedure(s) (LRB):  COLONOSCOPY (N/A)         Interval History:   Patient seen and examined.  Two bloody bowel movements overnight.  Hgb stable at 10.  Denies abdominal pain.      Medications:  Continuous Infusions:  Scheduled Meds:   atorvastatin  20 mg Oral Daily    baclofen  10 mg Oral TID    donepeziL  10 mg Oral QHS    famotidine (PF)  20 mg Intravenous BID    fentaNYL  1 patch Transdermal Q72H    fluticasone propionate  1 spray Each Nostril Daily    gabapentin  100 mg Oral QHS    levothyroxine  125 mcg Oral Before breakfast    pantoprazole  40 mg Intravenous Daily    pramipexole  1 mg Oral TID    sertraline  50 mg Oral Daily    sodium,potassium,mag sulfates   Oral BID     PRN Meds:acetaminophen, albuterol-ipratropium, melatonin, ondansetron, phenyleph-min oil-petrolatum, sodium chloride 0.9%     Review of patient's allergies indicates:   Allergen Reactions    Codeine      Objective:     Vital Signs (Most Recent):  Temp: 98.7 °F (37.1 °C) (06/12/22 0709)  Pulse: 85 (06/12/22 0709)  Resp: 20 (06/12/22 0709)  BP: (!) 146/60 (06/12/22 0709)  SpO2: (!) 94 % (06/12/22 0709)   Vital Signs (24h Range):  Temp:  [97 °F (36.1 °C)-98.7 °F (37.1 °C)] 98.7 °F (37.1 °C)  Pulse:  [60-95] 85  Resp:  [20] 20  SpO2:  [94 %-97 %] 94 %  BP: (144-188)/(60-78) 146/60     Weight: 90.7 kg (200 lb)  Body mass index is 35.43 kg/m².    Intake/Output - Last 3 Shifts          06/10 0700  06/11 0659 06/11 0700  06/12 0659 06/12 0700  06/13 0659    P.O. 240 600     Total Intake(mL/kg) 240 (2.6) 600 (6.6)     Urine (mL/kg/hr) 550      Total Output 550      Net -310 +600            Urine Occurrence 1 x 2 x     Stool Occurrence  5 x             Physical Exam  Constitutional:       General: She is not in acute distress.     Appearance: She is not ill-appearing or toxic-appearing.   Cardiovascular:      Rate and Rhythm: Normal rate and regular rhythm.   Pulmonary:      Effort: Pulmonary effort is normal. No respiratory distress.   Abdominal:      General: Abdomen is flat. There is no distension.      Palpations: Abdomen is soft.      Tenderness: There is no abdominal tenderness. There is no guarding or rebound.   Musculoskeletal:      Right lower leg: No edema.      Left lower leg: No edema.   Skin:     Capillary Refill: Capillary refill takes less than 2 seconds.   Neurological:      Mental Status: She is alert. Mental status is at baseline.       Significant Labs:  I have reviewed all pertinent lab results within the past 24 hours.  CBC:   Recent Labs   Lab 06/12/22  0644   WBC 6.81   RBC 3.47*   HGB 10.1*   HCT 31.3*      MCV 90   MCH 29.1   MCHC 32.3     CMP:   Recent Labs   Lab 06/11/22  0553   *   CALCIUM 8.4*   ALBUMIN 2.7*   PROT 5.8*      K 3.8   CO2 29      BUN 36*   CREATININE 0.9   ALKPHOS 83   ALT 12   AST 7*   BILITOT 0.4       Significant Diagnostics:  I have reviewed all pertinent imaging results/findings within the past 24 hours.    Assessment/Plan:     * Gastrointestinal hemorrhage  -To endo tomorrow 6/13 for colonoscopy  -NPO midnight   -Suprep - pt will need supervision  -Continue CLD for today  -Will obtain consent from daughter  -EKG   -No lovenox due to current GI bleed         Shante Rey MD  General Surgery  [facility]

## 2022-06-12 NOTE — H&P
[facility]  History & Physical    Patient Name: Laisha Acosta  MRN: [id number]  Patient Class: OP- Observation  Admission Date: 6/10/2022  Attending Physician: Buster Nuñez III, MD   Primary Care Provider: Jo Underwood MD    Patient information was obtained from patient and ER records.     Subjective:     Principal Problem:Gastrointestinal hemorrhage    Chief Complaint:   Chief Complaint   Patient presents with    GI Problem     Per nursing home, pt had 1 episode of dark red / loose stool. Asymptomatic.         HPI: 86 year old female history of dementia, diabetes mellitis type 2, hypertension, obesity brought in by EMS from [facility] with bright red blood found on her diaper. Patient is primarily bed bound and requires two persons assist to get out of bed and into wheel chair.   Patient denies symptoms, no headaches, no dizziness, shortness of breath, palpitations. Patient has not had a colonoscopy in over 20 years.   Hemoglobin of 12 in the ED has been trended and has dropped to 10.5.   Patient is at her baseline mentation. Denies symptoms.   Will continue to observe and trend H/H. Consulted surgery for possible colonoscopy.           Past Medical History:   Diagnosis Date    Encephalopathy 12/24/2021     History reviewed. No pertinent surgical history.    Review of patient's allergies indicates:   Allergen Reactions    Codeine        No current facility-administered medications on file prior to encounter.     Current Outpatient Medications on File Prior to Encounter   Medication Sig    acetaminophen (TYLENOL) 325 MG tablet Take 650 mg by mouth 2 (two) times daily.    albuterol-ipratropium (DUO-NEB) 2.5 mg-0.5 mg/3 mL nebulizer solution Take 3 mLs by nebulization every 6 (six) hours as needed for Wheezing. Rescue    aspirin (ECOTRIN) 81 MG EC tablet Take 81 mg by mouth once daily.    bisacodyL (DULCOLAX) 5 mg EC tablet Take 5 mg by mouth daily as needed  for Constipation.    cyanocobalamin 1,000 mcg/mL injection 1,000 mcg.    donepeziL (ARICEPT) 10 MG tablet Take 10 mg by mouth every evening.    fentaNYL (DURAGESIC) 50 mcg/hr Place 1 patch onto the skin every 72 hours.    fluticasone propionate (FLONASE) 50 mcg/actuation nasal spray 1 spray by Each Nostril route once daily.    furosemide (LASIX) 40 MG tablet Take 40 mg by mouth 2 (two) times daily.    gabapentin (NEURONTIN) 100 MG capsule Take 100 mg by mouth every evening.    guaiFENesin 100 mg/5 ml (ROBITUSSIN) 100 mg/5 mL syrup Take 200 mg by mouth 3 (three) times daily as needed for Cough.    ibuprofen (ADVIL,MOTRIN) 400 MG tablet Take 400 mg by mouth every 8 (eight) hours as needed for Other.    insulin glargine,hum.rec.anlog (LANTUS SUBQ) Inject into the skin.    insulin lispro 100 unit/mL injection Inject into the skin 3 (three) times daily before meals.    levothyroxine (SYNTHROID) 125 MCG tablet Take 125 mcg by mouth before breakfast.    lubiprostone (AMITIZA) 8 MCG Cap Take 8 mcg by mouth 2 (two) times daily with meals.    nitrofurantoin (MACRODANTIN) 50 MG capsule Take 50 mg by mouth Daily.    pantoprazole (PROTONIX) 40 MG tablet Take 40 mg by mouth once daily.    simvastatin (ZOCOR) 20 MG tablet Take 20 mg by mouth every evening.    venlafaxine (EFFEXOR-XR) 75 MG 24 hr capsule Take 75 mg by mouth once daily.    albuterol (PROVENTIL) 2.5 mg /3 mL (0.083 %) nebulizer solution Take by nebulization.    baclofen (LIORESAL) 10 MG tablet Take 10 mg by mouth 3 (three) times daily.    loperamide (IMODIUM A-D) 2 mg Tab Take 2 mg by mouth 4 (four) times daily as needed.    mirtazapine (REMERON) 7.5 MG Tab Take 7.5 mg by mouth every evening.    oxyCODONE-acetaminophen (PERCOCET) 5-325 mg per tablet Take 1 tablet by mouth every 4 (four) hours as needed for Pain.    pramipexole (MIRAPEX) 1 MG tablet Take 1 mg by mouth 3 (three) times daily.    sertraline (ZOLOFT) 50 MG tablet Take 50 mg by mouth  once daily.     Family History    None       Tobacco Use    Smoking status: Not on file    Smokeless tobacco: Not on file   Substance and Sexual Activity    Alcohol use: Not on file    Drug use: Not on file    Sexual activity: Not on file     Review of Systems   Constitutional:  Negative for activity change, appetite change, chills, diaphoresis, fatigue, fever and unexpected weight change.   Respiratory:  Negative for cough, chest tightness and shortness of breath.    Cardiovascular:  Negative for chest pain, palpitations and leg swelling.   Gastrointestinal:  Positive for blood in stool. Negative for abdominal distention, abdominal pain, constipation, diarrhea, nausea, rectal pain and vomiting.   Musculoskeletal:  Positive for arthralgias and gait problem. Negative for back pain, neck pain and neck stiffness.   Skin: Negative.    Neurological:  Negative for tremors, seizures, syncope, weakness, light-headedness, numbness and headaches.   Psychiatric/Behavioral:  Negative for agitation, behavioral problems and confusion.    All other systems reviewed and are negative.  Objective:     Vital Signs (Most Recent):  Temp: 98.1 °F (36.7 °C) (06/11/22 0727)  Pulse: 76 (06/11/22 0727)  Resp: (!) 22 (06/11/22 0727)  BP: (!) 141/65 (06/11/22 0727)  SpO2: 95 % (06/11/22 0727)   Vital Signs (24h Range):  Temp:  [96.1 °F (35.6 °C)-98.2 °F (36.8 °C)] 98.1 °F (36.7 °C)  Pulse:  [66-98] 76  Resp:  [16-22] 22  SpO2:  [95 %-97 %] 95 %  BP: (135-168)/(65-72) 141/65     Weight: 90.7 kg (200 lb)  Body mass index is 35.43 kg/m².    Physical Exam  Vitals reviewed.   Constitutional:       General: She is not in acute distress.     Appearance: She is not ill-appearing or toxic-appearing.   HENT:      Right Ear: External ear normal.      Left Ear: External ear normal.      Mouth/Throat:      Mouth: Mucous membranes are dry.      Comments: edentulous  Eyes:      Extraocular Movements: Extraocular movements intact.   Cardiovascular:       Rate and Rhythm: Normal rate and regular rhythm.      Pulses: Normal pulses.      Heart sounds: Normal heart sounds. No murmur heard.  Pulmonary:      Effort: Pulmonary effort is normal. No respiratory distress.      Breath sounds: No wheezing or rales.   Abdominal:      General: Bowel sounds are normal. There is no distension.      Palpations: Abdomen is soft.      Tenderness: There is no abdominal tenderness. There is no right CVA tenderness, left CVA tenderness, guarding or rebound.   Genitourinary:     Comments: Grade II internal hemorrhoids   Scant amount of melena in rectal vault   Musculoskeletal:         General: No swelling.      Cervical back: Normal range of motion and neck supple.      Right lower leg: No edema.      Left lower leg: No edema.      Comments: Right leg contraction, flexed, at hip and knees, hip externally rotated. Left leg with strength and reduced ROM.   Skin:     General: Skin is warm and dry.      Capillary Refill: Capillary refill takes less than 2 seconds.      Findings: No erythema, lesion or rash.   Neurological:      General: No focal deficit present.      Mental Status: She is alert and oriented to person, place, and time. Mental status is at baseline.   Psychiatric:         Mood and Affect: Mood normal.         Behavior: Behavior normal.         Thought Content: Thought content normal.           Significant Labs: All pertinent labs within the past 24 hours have been reviewed.  Recent Labs   Lab 06/10/22  1353 06/11/22  0553   BILITOT 0.4 0.4     BMP:   Recent Labs   Lab 06/11/22  0553   *      K 3.8      CO2 29   BUN 36*   CREATININE 0.9   CALCIUM 8.4*     CBC:   Recent Labs   Lab 06/10/22  1820 06/11/22  0035 06/11/22  0553   WBC 6.14 6.43 6.03  6.03   HGB 10.9* 10.6* 10.3*  10.3*   HCT 33.7* 32.6* 32.3*  32.3*    222 227  227     CMP:   Recent Labs   Lab 06/10/22  1353 06/11/22  0553    144   K 3.8 3.8    109   CO2 29 29   * 268*    BUN 43* 36*   CREATININE 1.0 0.9   CALCIUM 8.9 8.4*   PROT 6.8 5.8*   ALBUMIN 3.2* 2.7*   BILITOT 0.4 0.4   ALKPHOS 98 83   AST 11 7*   ALT 15 12   ANIONGAP 6* 6*   EGFRNONAA 51.9* 58.9*     Recent Labs   Lab 06/10/22  1353   INR 0.9   APTT 23.2         Assessment/Plan:     * Gastrointestinal hemorrhage  Witnessed bright red blood on diaper at nursing home and another darkened stool early morning per nursing team here.   H/H has been trending, slowly from 12 to 10.6, 10.3.   Vital signs stable. Patient asymptomatic.   - surgery finding of hemorrhoids  - with continued drop in H/H, possible colonoscopy on Monday  - transfuse patient becomes symptomatic <7g/dL  - trend H/H Q8H    Age-related cognitive decline  Patient alert and oriented x3.   Continue with home meds.      BMI 35.0-35.9,adult  Body mass index is 35.43 kg/m². Morbid obesity complicates all aspects of disease management from diagnostic modalities to treatment. Weight loss encouraged and health benefits explained to patient.           Other specified hypothyroidism  Continue home dose of levothyroxine  Patient alert with no complaints.         VTE Risk Mitigation (From admission, onward)         Ordered     IP VTE HIGH RISK PATIENT  Once         06/10/22 1602     Place sequential compression device  Until discontinued         06/10/22 1602                   Real Patterson DO  Department of Hospital Medicine   [facility]

## 2022-06-12 NOTE — SUBJECTIVE & OBJECTIVE
Past Medical History:   Diagnosis Date    Encephalopathy 12/24/2021     History reviewed. No pertinent surgical history.    Review of patient's allergies indicates:   Allergen Reactions    Codeine        No current facility-administered medications on file prior to encounter.     Current Outpatient Medications on File Prior to Encounter   Medication Sig    acetaminophen (TYLENOL) 325 MG tablet Take 650 mg by mouth 2 (two) times daily.    albuterol-ipratropium (DUO-NEB) 2.5 mg-0.5 mg/3 mL nebulizer solution Take 3 mLs by nebulization every 6 (six) hours as needed for Wheezing. Rescue    aspirin (ECOTRIN) 81 MG EC tablet Take 81 mg by mouth once daily.    bisacodyL (DULCOLAX) 5 mg EC tablet Take 5 mg by mouth daily as needed for Constipation.    cyanocobalamin 1,000 mcg/mL injection 1,000 mcg.    donepeziL (ARICEPT) 10 MG tablet Take 10 mg by mouth every evening.    fentaNYL (DURAGESIC) 50 mcg/hr Place 1 patch onto the skin every 72 hours.    fluticasone propionate (FLONASE) 50 mcg/actuation nasal spray 1 spray by Each Nostril route once daily.    furosemide (LASIX) 40 MG tablet Take 40 mg by mouth 2 (two) times daily.    gabapentin (NEURONTIN) 100 MG capsule Take 100 mg by mouth every evening.    guaiFENesin 100 mg/5 ml (ROBITUSSIN) 100 mg/5 mL syrup Take 200 mg by mouth 3 (three) times daily as needed for Cough.    ibuprofen (ADVIL,MOTRIN) 400 MG tablet Take 400 mg by mouth every 8 (eight) hours as needed for Other.    insulin glargine,hum.rec.anlog (LANTUS SUBQ) Inject into the skin.    insulin lispro 100 unit/mL injection Inject into the skin 3 (three) times daily before meals.    levothyroxine (SYNTHROID) 125 MCG tablet Take 125 mcg by mouth before breakfast.    lubiprostone (AMITIZA) 8 MCG Cap Take 8 mcg by mouth 2 (two) times daily with meals.    nitrofurantoin (MACRODANTIN) 50 MG capsule Take 50 mg by mouth Daily.    pantoprazole (PROTONIX) 40 MG tablet Take 40 mg by mouth once daily.     simvastatin (ZOCOR) 20 MG tablet Take 20 mg by mouth every evening.    venlafaxine (EFFEXOR-XR) 75 MG 24 hr capsule Take 75 mg by mouth once daily.    albuterol (PROVENTIL) 2.5 mg /3 mL (0.083 %) nebulizer solution Take by nebulization.    baclofen (LIORESAL) 10 MG tablet Take 10 mg by mouth 3 (three) times daily.    loperamide (IMODIUM A-D) 2 mg Tab Take 2 mg by mouth 4 (four) times daily as needed.    mirtazapine (REMERON) 7.5 MG Tab Take 7.5 mg by mouth every evening.    oxyCODONE-acetaminophen (PERCOCET) 5-325 mg per tablet Take 1 tablet by mouth every 4 (four) hours as needed for Pain.    pramipexole (MIRAPEX) 1 MG tablet Take 1 mg by mouth 3 (three) times daily.    sertraline (ZOLOFT) 50 MG tablet Take 50 mg by mouth once daily.     Family History    None       Tobacco Use    Smoking status: Not on file    Smokeless tobacco: Not on file   Substance and Sexual Activity    Alcohol use: Not on file    Drug use: Not on file    Sexual activity: Not on file     Review of Systems   Constitutional:  Negative for activity change, appetite change, chills, diaphoresis, fatigue, fever and unexpected weight change.   Respiratory:  Negative for cough, chest tightness and shortness of breath.    Cardiovascular:  Negative for chest pain, palpitations and leg swelling.   Gastrointestinal:  Positive for blood in stool. Negative for abdominal distention, abdominal pain, constipation, diarrhea, nausea, rectal pain and vomiting.   Musculoskeletal:  Positive for arthralgias and gait problem. Negative for back pain, neck pain and neck stiffness.   Skin: Negative.    Neurological:  Negative for tremors, seizures, syncope, weakness, light-headedness, numbness and headaches.   Psychiatric/Behavioral:  Negative for agitation, behavioral problems and confusion.    All other systems reviewed and are negative.  Objective:     Vital Signs (Most Recent):  Temp: 98.8 °F (37.1 °C) (06/12/22 1127)  Pulse: 97 (06/12/22 1127)  Resp:  20 (06/12/22 1127)  BP: 139/65 (06/12/22 1127)  SpO2: (!) 94 % (06/12/22 1127)   Vital Signs (24h Range):  Temp:  [97 °F (36.1 °C)-98.8 °F (37.1 °C)] 98.8 °F (37.1 °C)  Pulse:  [60-97] 97  Resp:  [20] 20  SpO2:  [94 %-97 %] 94 %  BP: (139-188)/(60-78) 139/65     Weight: 90.7 kg (200 lb)  Body mass index is 35.43 kg/m².    Physical Exam  Vitals reviewed.   Constitutional:       General: She is not in acute distress.     Appearance: She is not ill-appearing or toxic-appearing.   HENT:      Right Ear: External ear normal.      Left Ear: External ear normal.      Mouth/Throat:      Mouth: Mucous membranes are dry.      Comments: edentulous  Eyes:      Extraocular Movements: Extraocular movements intact.   Cardiovascular:      Rate and Rhythm: Normal rate and regular rhythm.      Pulses: Normal pulses.      Heart sounds: Normal heart sounds. No murmur heard.  Pulmonary:      Effort: Pulmonary effort is normal. No respiratory distress.      Breath sounds: No wheezing or rales.   Abdominal:      General: Bowel sounds are normal. There is no distension.      Palpations: Abdomen is soft.      Tenderness: There is no abdominal tenderness. There is no right CVA tenderness, left CVA tenderness, guarding or rebound.   Genitourinary:     Comments: Grade II internal hemorrhoids   Scant amount of melena in rectal vault   Musculoskeletal:         General: No swelling.      Cervical back: Normal range of motion and neck supple.      Right lower leg: No edema.      Left lower leg: No edema.      Comments: Right leg contraction, flexed, at hip and knees, hip externally rotated. Left leg with strength and reduced ROM.   Skin:     General: Skin is warm and dry.      Capillary Refill: Capillary refill takes less than 2 seconds.      Findings: No erythema, lesion or rash.   Neurological:      General: No focal deficit present.      Mental Status: She is alert and oriented to person, place, and time. Mental status is at baseline.    Psychiatric:         Mood and Affect: Mood normal.         Behavior: Behavior normal.         Thought Content: Thought content normal.           Significant Labs: All pertinent labs within the past 24 hours have been reviewed.  Recent Labs   Lab 06/10/22  1353 06/11/22  0553   BILITOT 0.4 0.4       BMP:   Recent Labs   Lab 06/11/22  0553   *      K 3.8      CO2 29   BUN 36*   CREATININE 0.9   CALCIUM 8.4*       CBC:   Recent Labs   Lab 06/11/22  1214 06/11/22  1705 06/12/22  0644   WBC 6.12 6.58 6.81   HGB 10.5* 10.0* 10.1*   HCT 32.3* 31.1* 31.3*    224 218       CMP:   Recent Labs   Lab 06/10/22  1353 06/11/22  0553    144   K 3.8 3.8    109   CO2 29 29   * 268*   BUN 43* 36*   CREATININE 1.0 0.9   CALCIUM 8.9 8.4*   PROT 6.8 5.8*   ALBUMIN 3.2* 2.7*   BILITOT 0.4 0.4   ALKPHOS 98 83   AST 11 7*   ALT 15 12   ANIONGAP 6* 6*   EGFRNONAA 51.9* 58.9*       Recent Labs   Lab 06/10/22  1353   INR 0.9   APTT 23.2

## 2022-06-12 NOTE — HOSPITAL COURSE
6/12/2022 FM:  Patient denies any further bleeding.  General surgery has the patient scheduled for endoscopy tomorrow.  Patient's initial admitting EKG was abnormal in general surgery is requesting cardiology clearance.  Will have them see her in the morning before surgery.  Patient is denying any chest pain or breathing trouble we will add a troponin to her blood work.  6/13 SD: Pt unable to have colonoscopy today - rescheduled for Wednesday 6/15/22. Pt denies any new bleeding.  6/14 SD: Hgb <7g/dL; nurse reports bright red blood per rectum - transfuse 2 units PRBC and recheck CBC following transfusion. Continue IV abx, following cultures. Colonoscopy scheduled for tomorrow 6/15/22.  6/15 SD: S/P transfusion 2 units PRBC 6/14/22 - H&H improved following transfusion. Pt drowsy today - D/C pain medicines, ABG, and CXR.  Colonoscopy cancelled S/T pt's decreased level of consciousness this morning. Fluids changed to 0.45% saline S/T hypernatremia.  6/16 SD: Pt awake and alert today, eating a pop sickle. Blood counts remaining good.  6/17 SD: Pt awake and alert. Blood counts stable. Advance diet.  6/18 SD: Pt awake and alert. H&H stable with no active bleeding. Continue IV abx for UTI. Adjust repletion of potassium. Adjust insulin for elevated blood sugars.  6/19 SD: Pt awake and alert. Holding conversation today. H&H stable with no active bleeding. Continue IV abx for UTI. Blood sugar improved.  6/20 SD: Pt awake and alert, holding appropriate conversation. Mental status appears to be at baseline. H&H stable with no active bleeding. Discharge home today with close monitoring for bleeding. Resume UTI prophylactic as taking prior to hospital admit.

## 2022-06-12 NOTE — ASSESSMENT & PLAN NOTE
Witnessed bright red blood on diaper at nursing home and another darkened stool early morning per nursing team here.   H/H has been trending, slowly from 12 to 10.6, 10.3.   Vital signs stable. Patient asymptomatic.   - surgery finding of hemorrhoids  - with continued drop in H/H, possible colonoscopy on Monday  - transfuse patient becomes symptomatic <7g/dL

## 2022-06-12 NOTE — ASSESSMENT & PLAN NOTE
-To endo tomorrow 6/13 for colonoscopy  -NPO midnight   -Suprep - pt will need supervision  -Continue CLD for today  -Will obtain consent from daughter  -EKG   -No lovenox due to current GI bleed

## 2022-06-12 NOTE — CONSULTS
Cardiology Consultation Note  ([facility])    Today's Date:  6/12/2022  Patient Name: Laisha Acosta    MRN: [id number]    Code Status: Full Code     Attending Physician: Jo Underwood MD   Consulting Physician: JOSÉ MIGUEL Donnelly MD  ______________________________________________________________________    Reason for Consult:   Pre-surgical evaluation in the context of an abnormal EKG.    Temp: 98.8 °F (37.1 °C) (06/12/22 1127)  Pulse: 97 (06/12/22 1127)  Resp: 20 (06/12/22 1127)  BP: 139/65 (06/12/22 1127)  SpO2: (!) 94 % (06/12/22 1127)     Subjective:   The patient is an 86-year-old lady whose past medical history is remarkable for:  · Hypothyroidism  · Hypertension  · Hyperlipidemia  · Parkinson's disease  · Diabetes mellitus (type 2)  · Anemia  · History of UTIs (Klebsiella)  · Dementia  · Major depression  · Status post right knee replacement    The patient is in a chronic comorbid state.  She presents from the nursing home with noted bright red blood per rectum.  In addition, her hemoglobin has been trending downward.    Hemodynamically she has been stable with blood pressure recordings in the 140-150 mmHg range..  However, her hemoglobin has been stable over the last 24 hours at around 10.5.  Occult blood:  Positive.    EKG (12/2421):   Normal sinus rhythm normal sinus rhythm with a first-degree AV block with a first-degree AV-no ischemic changes.  Block-no ischemic changes.    EKG (6/12/22):  06/12/2022): normal sinus rhythm normal sinus rhythm; resolved resolved first-degree AV bl first-degree AV block;ock; nonspecific ST changes nonspecific ST changes -no acute ischemic changes noted.     Troponin I:  9.8    Chest x-ray (12/27/2021):  Small right pleural effusion; rotated an overpenetrated.    Oxygen saturation:  94%      Past Medical History:   Diagnosis Date    Encephalopathy 12/24/2021       History reviewed. No pertinent surgical history.    History reviewed. No pertinent family  history.    Social History     Socioeconomic History    Marital status:        Medications:  Current Facility-Administered Medications   Medication Dose Route Frequency Provider Last Rate Last Admin    acetaminophen tablet 650 mg  650 mg Oral Q8H PRN Real Patterson DO   650 mg at 06/12/22 1028    albuterol-ipratropium 2.5 mg-0.5 mg/3 mL nebulizer solution 3 mL  3 mL Nebulization Q6H PRN Real Patterson DO        atorvastatin tablet 20 mg  20 mg Oral Daily Real Patterson DO   20 mg at 06/12/22 0836    baclofen tablet 10 mg  10 mg Oral TID Real Patterson DO   10 mg at 06/12/22 0836    donepeziL tablet 10 mg  10 mg Oral QHS Real Patterson DO   10 mg at 06/11/22 2139    famotidine (PF) injection 20 mg  20 mg Intravenous BID Real Patterson DO   20 mg at 06/12/22 0836    fentaNYL 50 mcg/hr 1 patch  1 patch Transdermal Q72H Real Patterson DO   1 patch at 06/10/22 2038    fluticasone propionate 50 mcg/actuation nasal spray 50 mcg  1 spray Each Nostril Daily Real Patterson DO   50 mcg at 06/12/22 0836    gabapentin capsule 100 mg  100 mg Oral QHS Real Patterson DO   100 mg at 06/11/22 2139    lactated ringers infusion   Intravenous Continuous Shante Rey  mL/hr at 06/12/22 0951 New Bag at 06/12/22 0951    levothyroxine tablet 125 mcg  125 mcg Oral Before breakfast Real Patterson DO   125 mcg at 06/12/22 0533    melatonin tablet 6 mg  6 mg Oral Nightly PRN Real Patterson DO   6 mg at 06/10/22 2045    ondansetron injection 4 mg  4 mg Intravenous Q8H PRN Real Patterson DO        pantoprazole injection 40 mg  40 mg Intravenous Daily Real Patterson DO   40 mg at 06/12/22 0836    phenyleph-min oil-petrolatum 0.25-14-74.9 % ointment 1 applicator  1 applicator Rectal QID PRN Real Patterson DO        pramipexole tablet 1 mg  1 mg Oral TID Real Patterson DO   1 mg at 06/12/22 0836    sertraline tablet 50 mg  50  mg Oral Daily Real Patterson, DO   50 mg at 06/12/22 0836    sodium chloride 0.9% flush 10 mL  10 mL Intravenous PRN Real Patterson, DO        sodium,potassium,mag sulfates 17.5-3.13-1.6 gram SolR   Oral BID Shante Rey MD           Allergies:  Review of patient's allergies indicates:   Allergen Reactions    Codeine         Labs:   CMP   Recent Labs   Lab 06/10/22  1353 06/11/22  0553    144   K 3.8 3.8    109   CO2 29 29   * 268*   BUN 43* 36*   CREATININE 1.0 0.9   CALCIUM 8.9 8.4*   PROT 6.8 5.8*   ALBUMIN 3.2* 2.7*   BILITOT 0.4 0.4   ALKPHOS 98 83   AST 11 7*   ALT 15 12   ANIONGAP 6* 6*   ESTGFRAFRICA 59.8* >60.0   EGFRNONAA 51.9* 58.9*   , CBC   Recent Labs   Lab 06/11/22  1214 06/11/22  1705 06/12/22  0644   WBC 6.12 6.58 6.81   HGB 10.5* 10.0* 10.1*   HCT 32.3* 31.1* 31.3*    224 218    and Troponin No results for input(s): TROPONINI in the last 48 hours.      Vital Signs (Most Recent):  Temp: 98.8 °F (37.1 °C) (06/12/22 1127)  Pulse: 97 (06/12/22 1127)  Resp: 20 (06/12/22 1127)  BP: 139/65 (06/12/22 1127)  SpO2: (!) 94 % (06/12/22 1127) Vital Signs (24h Range):  Temp:  [97 °F (36.1 °C)-98.8 °F (37.1 °C)] 98.8 °F (37.1 °C)  Pulse:  [60-97] 97  Resp:  [20] 20  SpO2:  [94 %-97 %] 94 %  BP: (139-188)/(60-78) 139/65     Weight: 90.7 kg (200 lb)  Body mass index is 35.43 kg/m².    SpO2: (!) 94 %  O2 Device (Oxygen Therapy): room air      Intake/Output Summary (Last 24 hours) at 6/12/2022 1312  Last data filed at 6/11/2022 1700  Gross per 24 hour   Intake 600 ml   Output --   Net 600 ml       Review of Systems   Cardiovascular: Positive for leg swelling. Negative for chest pain and cyanosis.   Respiratory: Negative for cough, hemoptysis and wheezing.    Skin: Positive for dry skin.   Gastrointestinal: Negative for bloating and abdominal pain.   Neurological: Positive for difficulty with concentration, excessive daytime sleepiness and weakness.       Physical  Exam  General:  Chronic comorbid state; communicating very poorly.  No distress.  Heent:  No JVD.  Lungs:  Clear to auscultation but with very poor breath sounds secondary to poor inspiratory effort.  Heart:  Regular rate rhythm; grade 3 systolic ejection murmur best appreciated at the right upper sternal border.  Extremities:  Trace bilateral edema severe; skin tenting.    Assessment and Plan:   1. Abnormal EKG:  There is a slight interval change in her EKG.  There are no acute ischemic changes however.  Telemetry reveals a normal sinus rhythm without ectopy.  She is hemodynamically stable.  She has risk factors for cardiovascular disease, therefore her pretest probability is likely intermediate.  The patient is poorly functional.  · Recommend that the patient proceed with endoscopy. Her perioperative risk is low to intermediate.    2. Valvular disease:  By physical examination she has a grade 2-3 systolic ejection murmur consistent with aortic valve stenosis.  By physical exam this would not suggest severe or critical aortic valve stenosis as there is no pulsus tardus et parvus.  · Recommend echocardiogram.    3. Hypertension:  Will continue to monitor.    · Will make recommendations after echocardiogram.    4. Hyperlipidemia:  The patient is on atorvastatin therapy.    5. Hypothyroidism:  As per primary care physician.  Would make adjustments based on TSH levels.    6. GI bleed:  Patient scheduled for colonoscopy tomorrow morning.  From my perspective, she may proceed without further cardiovascular testing.  · Continue to monitor hemoglobin.  · Need to consider donepezil therapy given side-effect profile which may include GI bleed.    Will follow up with echocardiogram and adjust medications based on that study.

## 2022-06-12 NOTE — ASSESSMENT & PLAN NOTE
Body mass index is 35.43 kg/m². Morbid obesity complicates all aspects of disease management from diagnostic modalities to treatment. Weight loss encouraged and health benefits explained to patient.

## 2022-06-12 NOTE — PROGRESS NOTES
[facility] Medicine  Progress Note    Patient Name: Laisha Acosta  MRN: [id number]  Patient Class: OP- Observation   Admission Date: 6/10/2022  Length of Stay: 1 days  Attending Physician: Buster Nuñez III, MD  Primary Care Provider: Jo Underwood MD        Subjective:     Principal Problem:Gastrointestinal hemorrhage        HPI:  86 year old female history of dementia, diabetes mellitis type 2, hypertension, obesity brought in by EMS from [facility] with bright red blood found on her diaper. Patient is primarily bed bound and requires two persons assist to get out of bed and into wheel chair.   Patient denies symptoms, no headaches, no dizziness, shortness of breath, palpitations. Patient has not had a colonoscopy in over 20 years.   Hemoglobin of 12 in the ED has been trended and has dropped to 10.5.   Patient is at her baseline mentation. Denies symptoms.   Will continue to observe and trend H/H. Consulted surgery for possible colonoscopy.           Overview/Hospital Course:  6/12/2022 FM:  Patient denies any further bleeding.  General surgery has the patient scheduled for endoscopy tomorrow.  Patient's initial admitting EKG was abnormal in general surgery is requesting cardiology clearance.  Will have them see her in the morning before surgery.  Patient is denying any chest pain or breathing trouble we will add a troponin to her blood work.      Past Medical History:   Diagnosis Date    Encephalopathy 12/24/2021     History reviewed. No pertinent surgical history.    Review of patient's allergies indicates:   Allergen Reactions    Codeine        No current facility-administered medications on file prior to encounter.     Current Outpatient Medications on File Prior to Encounter   Medication Sig    acetaminophen (TYLENOL) 325 MG tablet Take 650 mg by mouth 2 (two) times daily.    albuterol-ipratropium (DUO-NEB) 2.5 mg-0.5 mg/3 mL nebulizer solution Take 3 mLs by  nebulization every 6 (six) hours as needed for Wheezing. Rescue    aspirin (ECOTRIN) 81 MG EC tablet Take 81 mg by mouth once daily.    bisacodyL (DULCOLAX) 5 mg EC tablet Take 5 mg by mouth daily as needed for Constipation.    cyanocobalamin 1,000 mcg/mL injection 1,000 mcg.    donepeziL (ARICEPT) 10 MG tablet Take 10 mg by mouth every evening.    fentaNYL (DURAGESIC) 50 mcg/hr Place 1 patch onto the skin every 72 hours.    fluticasone propionate (FLONASE) 50 mcg/actuation nasal spray 1 spray by Each Nostril route once daily.    furosemide (LASIX) 40 MG tablet Take 40 mg by mouth 2 (two) times daily.    gabapentin (NEURONTIN) 100 MG capsule Take 100 mg by mouth every evening.    guaiFENesin 100 mg/5 ml (ROBITUSSIN) 100 mg/5 mL syrup Take 200 mg by mouth 3 (three) times daily as needed for Cough.    ibuprofen (ADVIL,MOTRIN) 400 MG tablet Take 400 mg by mouth every 8 (eight) hours as needed for Other.    insulin glargine,hum.rec.anlog (LANTUS SUBQ) Inject into the skin.    insulin lispro 100 unit/mL injection Inject into the skin 3 (three) times daily before meals.    levothyroxine (SYNTHROID) 125 MCG tablet Take 125 mcg by mouth before breakfast.    lubiprostone (AMITIZA) 8 MCG Cap Take 8 mcg by mouth 2 (two) times daily with meals.    nitrofurantoin (MACRODANTIN) 50 MG capsule Take 50 mg by mouth Daily.    pantoprazole (PROTONIX) 40 MG tablet Take 40 mg by mouth once daily.    simvastatin (ZOCOR) 20 MG tablet Take 20 mg by mouth every evening.    venlafaxine (EFFEXOR-XR) 75 MG 24 hr capsule Take 75 mg by mouth once daily.    albuterol (PROVENTIL) 2.5 mg /3 mL (0.083 %) nebulizer solution Take by nebulization.    baclofen (LIORESAL) 10 MG tablet Take 10 mg by mouth 3 (three) times daily.    loperamide (IMODIUM A-D) 2 mg Tab Take 2 mg by mouth 4 (four) times daily as needed.    mirtazapine (REMERON) 7.5 MG Tab Take 7.5 mg by mouth every evening.    oxyCODONE-acetaminophen (PERCOCET) 5-325 mg  per tablet Take 1 tablet by mouth every 4 (four) hours as needed for Pain.    pramipexole (MIRAPEX) 1 MG tablet Take 1 mg by mouth 3 (three) times daily.    sertraline (ZOLOFT) 50 MG tablet Take 50 mg by mouth once daily.     Family History    None       Tobacco Use    Smoking status: Not on file    Smokeless tobacco: Not on file   Substance and Sexual Activity    Alcohol use: Not on file    Drug use: Not on file    Sexual activity: Not on file     Review of Systems   Constitutional:  Negative for activity change, appetite change, chills, diaphoresis, fatigue, fever and unexpected weight change.   Respiratory:  Negative for cough, chest tightness and shortness of breath.    Cardiovascular:  Negative for chest pain, palpitations and leg swelling.   Gastrointestinal:  Positive for blood in stool. Negative for abdominal distention, abdominal pain, constipation, diarrhea, nausea, rectal pain and vomiting.   Musculoskeletal:  Positive for arthralgias and gait problem. Negative for back pain, neck pain and neck stiffness.   Skin: Negative.    Neurological:  Negative for tremors, seizures, syncope, weakness, light-headedness, numbness and headaches.   Psychiatric/Behavioral:  Negative for agitation, behavioral problems and confusion.    All other systems reviewed and are negative.  Objective:     Vital Signs (Most Recent):  Temp: 98.8 °F (37.1 °C) (06/12/22 1127)  Pulse: 97 (06/12/22 1127)  Resp: 20 (06/12/22 1127)  BP: 139/65 (06/12/22 1127)  SpO2: (!) 94 % (06/12/22 1127)   Vital Signs (24h Range):  Temp:  [97 °F (36.1 °C)-98.8 °F (37.1 °C)] 98.8 °F (37.1 °C)  Pulse:  [60-97] 97  Resp:  [20] 20  SpO2:  [94 %-97 %] 94 %  BP: (139-188)/(60-78) 139/65     Weight: 90.7 kg (200 lb)  Body mass index is 35.43 kg/m².    Physical Exam  Vitals reviewed.   Constitutional:       General: She is not in acute distress.     Appearance: She is not ill-appearing or toxic-appearing.   HENT:      Right Ear: External ear normal.       Left Ear: External ear normal.      Mouth/Throat:      Mouth: Mucous membranes are dry.      Comments: edentulous  Eyes:      Extraocular Movements: Extraocular movements intact.   Cardiovascular:      Rate and Rhythm: Normal rate and regular rhythm.      Pulses: Normal pulses.      Heart sounds: Normal heart sounds. No murmur heard.  Pulmonary:      Effort: Pulmonary effort is normal. No respiratory distress.      Breath sounds: No wheezing or rales.   Abdominal:      General: Bowel sounds are normal. There is no distension.      Palpations: Abdomen is soft.      Tenderness: There is no abdominal tenderness. There is no right CVA tenderness, left CVA tenderness, guarding or rebound.   Genitourinary:     Comments: Grade II internal hemorrhoids   Scant amount of melena in rectal vault   Musculoskeletal:         General: No swelling.      Cervical back: Normal range of motion and neck supple.      Right lower leg: No edema.      Left lower leg: No edema.      Comments: Right leg contraction, flexed, at hip and knees, hip externally rotated. Left leg with strength and reduced ROM.   Skin:     General: Skin is warm and dry.      Capillary Refill: Capillary refill takes less than 2 seconds.      Findings: No erythema, lesion or rash.   Neurological:      General: No focal deficit present.      Mental Status: She is alert and oriented to person, place, and time. Mental status is at baseline.   Psychiatric:         Mood and Affect: Mood normal.         Behavior: Behavior normal.         Thought Content: Thought content normal.           Significant Labs: All pertinent labs within the past 24 hours have been reviewed.  Recent Labs   Lab 06/10/22  1353 06/11/22  0553   BILITOT 0.4 0.4       BMP:   Recent Labs   Lab 06/11/22  0553   *      K 3.8      CO2 29   BUN 36*   CREATININE 0.9   CALCIUM 8.4*       CBC:   Recent Labs   Lab 06/11/22  1214 06/11/22  1705 06/12/22  0644   WBC 6.12 6.58 6.81   HGB 10.5*  10.0* 10.1*   HCT 32.3* 31.1* 31.3*    224 218       CMP:   Recent Labs   Lab 06/10/22  1353 06/11/22  0553    144   K 3.8 3.8    109   CO2 29 29   * 268*   BUN 43* 36*   CREATININE 1.0 0.9   CALCIUM 8.9 8.4*   PROT 6.8 5.8*   ALBUMIN 3.2* 2.7*   BILITOT 0.4 0.4   ALKPHOS 98 83   AST 11 7*   ALT 15 12   ANIONGAP 6* 6*   EGFRNONAA 51.9* 58.9*       Recent Labs   Lab 06/10/22  1353   INR 0.9   APTT 23.2             Assessment/Plan:      * Gastrointestinal hemorrhage  Witnessed bright red blood on diaper at nursing home and another darkened stool early morning per nursing team here.   H/H has been trending, slowly from 12 to 10.6, 10.3.   Vital signs stable. Patient asymptomatic.   - surgery finding of hemorrhoids  - with continued drop in H/H, possible colonoscopy on Monday  - transfuse patient becomes symptomatic <7g/dL      Age-related cognitive decline  Patient alert and oriented x3.   Continue with home meds.      BMI 35.0-35.9,adult  Body mass index is 35.43 kg/m². Morbid obesity complicates all aspects of disease management from diagnostic modalities to treatment. Weight loss encouraged and health benefits explained to patient.           Other specified hypothyroidism  Continue home dose of levothyroxine  Patient alert with no complaints.       Encephalopathy          VTE Risk Mitigation (From admission, onward)         Ordered     IP VTE HIGH RISK PATIENT  Once         06/10/22 1602     Place sequential compression device  Until discontinued         06/10/22 1602                Discharge Planning   KIANA:      Code Status: Full Code   Is the patient medically ready for discharge?:     Reason for patient still in hospital (select all that apply): Patient new problem and Patient trending condition                     Buster Nuñez III, MD  Department of Hospital Medicine   [facility]

## 2022-06-13 PROBLEM — N30.01 ACUTE CYSTITIS WITH HEMATURIA: Status: ACTIVE | Noted: 2021-12-24

## 2022-06-13 NOTE — PROGRESS NOTES
[facility] Medicine  Progress Note    Patient Name: Laisha Acosta  MRN: [id number]  Patient Class: IP- Inpatient   Admission Date: 6/10/2022  Length of Stay: 1 days  Attending Physician: Jo Underwood MD  Primary Care Provider: Jo Underwood MD        Subjective:     Principal Problem:Gastrointestinal hemorrhage        HPI:  86 year old female history of dementia, diabetes mellitis type 2, hypertension, obesity brought in by EMS from [facility] with bright red blood found on her diaper. Patient is primarily bed bound and requires two persons assist to get out of bed and into wheel chair.   Patient denies symptoms, no headaches, no dizziness, shortness of breath, palpitations. Patient has not had a colonoscopy in over 20 years.   Hemoglobin of 12 in the ED has been trended and has dropped to 10.5.   Patient is at her baseline mentation. Denies symptoms.   Will continue to observe and trend H/H. Consulted surgery for possible colonoscopy.           Overview/Hospital Course:  6/12/2022 FM:  Patient denies any further bleeding.  General surgery has the patient scheduled for endoscopy tomorrow.  Patient's initial admitting EKG was abnormal in general surgery is requesting cardiology clearance.  Will have them see her in the morning before surgery.  Patient is denying any chest pain or breathing trouble we will add a troponin to her blood work.  6/13 SD: Pt unable to have colonoscopy today - rescheduled for Wednesday 6/15/22. Pt denies any new bleeding.      Interval History: Pt seen and evaluated    Review of Systems   Constitutional:  Positive for activity change and fatigue. Negative for chills and fever.   HENT: Negative.     Respiratory:  Negative for cough, chest tightness and shortness of breath.    Cardiovascular:  Negative for chest pain, palpitations and leg swelling.   Gastrointestinal:  Positive for blood in stool. Negative for abdominal distention, abdominal pain,  constipation, diarrhea, nausea, rectal pain and vomiting.   Genitourinary:  Positive for dysuria.   Musculoskeletal:  Positive for arthralgias and gait problem.   Skin: Negative.    Neurological:  Negative for tremors, seizures, syncope, light-headedness, numbness and headaches.   Psychiatric/Behavioral:  Negative for agitation, behavioral problems and confusion.    All other systems reviewed and are negative.  Objective:     Vital Signs (Most Recent):  Temp: 98.5 °F (36.9 °C) (06/13/22 1153)  Pulse: (!) 121 (06/13/22 1153)  Resp: 18 (06/13/22 1153)  BP: (!) 160/71 (06/13/22 1153)  SpO2: 100 % (06/13/22 1153)   Vital Signs (24h Range):  Temp:  [98.5 °F (36.9 °C)-98.9 °F (37.2 °C)] 98.5 °F (36.9 °C)  Pulse:  [] 121  Resp:  [18-24] 18  SpO2:  [94 %-100 %] 100 %  BP: (134-196)/(62-85) 160/71     Weight: 90.7 kg (200 lb)  Body mass index is 35.43 kg/m².    Intake/Output Summary (Last 24 hours) at 6/13/2022 1238  Last data filed at 6/13/2022 0635  Gross per 24 hour   Intake 3269 ml   Output --   Net 3269 ml      Physical Exam  Vitals reviewed.   Constitutional:       General: She is not in acute distress.     Appearance: She is not ill-appearing or toxic-appearing.   HENT:      Right Ear: External ear normal.      Left Ear: External ear normal.      Mouth/Throat:      Mouth: Mucous membranes are dry.      Comments: edentulous  Eyes:      Extraocular Movements: Extraocular movements intact.   Cardiovascular:      Rate and Rhythm: Normal rate and regular rhythm.      Pulses: Normal pulses.      Heart sounds: Normal heart sounds. No murmur heard.  Pulmonary:      Effort: Pulmonary effort is normal. No respiratory distress.      Breath sounds: No wheezing or rales.   Abdominal:      General: Bowel sounds are normal. There is no distension.      Palpations: Abdomen is soft.      Tenderness: There is no abdominal tenderness. There is no right CVA tenderness, left CVA tenderness, guarding or rebound.   Musculoskeletal:          General: No swelling.      Cervical back: Normal range of motion and neck supple.      Right lower leg: No edema.      Left lower leg: No edema.      Comments: Right leg contraction, flexed, at hip and knees, hip externally rotated. Left leg with strength and reduced ROM.   Skin:     General: Skin is warm and dry.      Capillary Refill: Capillary refill takes less than 2 seconds.      Findings: No erythema, lesion or rash.   Neurological:      General: No focal deficit present.      Mental Status: She is alert and oriented to person, place, and time. Mental status is at baseline.   Psychiatric:         Mood and Affect: Mood normal.         Behavior: Behavior normal.         Thought Content: Thought content normal.       Significant Labs: All pertinent labs within the past 24 hours have been reviewed.    CBC:   Recent Labs   Lab 06/11/22  1705 06/12/22  0644 06/13/22  0716   WBC 6.58 6.81 7.54   HGB 10.0* 10.1* 8.4*   HCT 31.1* 31.3* 25.9*    218 214     CMP:   Recent Labs   Lab 06/13/22  0716   *   K 3.7   *   CO2 26   *   BUN 30*   CREATININE 0.7   CALCIUM 8.5*   PROT 5.1*   ALBUMIN 2.4*   BILITOT 0.3   ALKPHOS 71   AST 6*   ALT 11   ANIONGAP 8   EGFRNONAA >60.0     Urine Culture: No results for input(s): LABURIN in the last 48 hours.    Urine Studies:   Recent Labs   Lab 06/12/22  1501   COLORU Yellow   APPEARANCEUA Cloudy*   PHUR 6.0   SPECGRAV 1.020   PROTEINUA Negative   GLUCUA Negative   KETONESU Trace*   BILIRUBINUA Negative   OCCULTUA 3+*   NITRITE Positive*   UROBILINOGEN 1.0   LEUKOCYTESUR 3+*   RBCUA 1   WBCUA 82*   BACTERIA Few*   SQUAMEPITHEL 1   HYALINECASTS 31.8*       Significant Imaging: I have reviewed all pertinent imaging results/findings within the past 24 hours.      Assessment/Plan:      * Gastrointestinal hemorrhage  Witnessed bright red blood on diaper at nursing home and another darkened stool early morning per nursing team here.   Vital signs stable. Patient  asymptomatic.   H/H has been trending down slowly from 12 to 10.6, 10.3; transfuse when pt becomes symptomatic or <7g/dL  Colonoscopy scheduled for Wednesday 6/15/22      Urinary tract infection       Day 2 of IV abx therapy with Rocephin      Age-related cognitive decline  Patient alert and oriented x3.   Continue with home meds.      BMI 35.0-35.9,adult  Body mass index is 35.43 kg/m². Morbid obesity complicates all aspects of disease management from diagnostic modalities to treatment. Weight loss encouraged and health benefits explained to patient.       Other specified hypothyroidism  Continue home dose of levothyroxine  Patient alert with no complaints.       Encephalopathy      DVT prophylaxis       SCDs       Hold oral anticoagulants S/T bleeding          VTE Risk Mitigation (From admission, onward)         Ordered     IP VTE HIGH RISK PATIENT  Once         06/10/22 1602     Place sequential compression device  Until discontinued         06/10/22 1602                Discharge Planning   KIANA:      Code Status: Full Code   Is the patient medically ready for discharge?:     Reason for patient still in hospital (select all that apply): Patient trending condition, Laboratory test and Treatment                     Mary Beth Good NP  Department of Hospital Medicine   [facility]

## 2022-06-13 NOTE — PROGRESS NOTES
Cardiology Progress Note  ([facility])    Today's Date:  6/13/2022  Patient Name: Laisha Acosta    MRN: [id number]    Code Status: Full Code     Attending Physician: Jo Underwood MD   Consulting Physician: JOSÉ MIGUEL Donnelly MD    Hospital Course:     Hemodynamic:   Blood pressure remains elevated.      Vital Signs (Most Recent):  Temp: 98.5 °F (36.9 °C) (06/13/22 1153)  Pulse: (!) 121 (06/13/22 1153)  Resp: 18 (06/13/22 1153)  BP: (!) 160/71 (06/13/22 1153)  SpO2: 100 % (06/13/22 1153) Vital Signs (24h Range):  Temp:  [98.5 °F (36.9 °C)-98.9 °F (37.2 °C)] 98.5 °F (36.9 °C)  Pulse:  [] 121  Resp:  [18-24] 18  SpO2:  [94 %-100 %] 100 %  BP: (134-196)/(62-85) 160/71     Weight: 90.7 kg (200 lb)  Body mass index is 35.43 kg/m².    SpO2: 100 %  O2 Device (Oxygen Therapy): room air      Intake/Output Summary (Last 24 hours) at 6/13/2022 1646  Last data filed at 6/13/2022 0635  Gross per 24 hour   Intake 3269 ml   Output --   Net 3269 ml     Labs:  CMP   Recent Labs   Lab 06/13/22  0716   *   K 3.7   *   CO2 26   *   BUN 30*   CREATININE 0.7   CALCIUM 8.5*   PROT 5.1*   ALBUMIN 2.4*   BILITOT 0.3   ALKPHOS 71   AST 6*   ALT 11   ANIONGAP 8   ESTGFRAFRICA >60.0   EGFRNONAA >60.0    and CBC   Recent Labs   Lab 06/11/22  1705 06/12/22  0644 06/13/22  0716   WBC 6.58 6.81 7.54   HGB 10.0* 10.1* 8.4*   HCT 31.1* 31.3* 25.9*    218 214       Assessment and Plan:     1.  Hypertension: blood pressure is elevated.  -- Amlodipine 5 mg daily.    2.  Anemia: hemoglobin today is 8.4 (down from 10.1).  -- Continue to monitor  -- Scheduled for endoscopy.    Echocardiogram:  · The left ventricle is normal in size with concentric remodeling and normal systolic function.  · The estimated ejection fraction is 65%.  · Mild tricuspid regurgitation.  · Aortic valve measures 0.9 cm2 by planimetery.  · There is moderate-to-severe aortic valve stenosis.  · Indeterminate central venous  pressure.  · Normal left ventricular diastolic function.  · Mild left atrial enlargement.  · Mild right atrial enlargement.  · Study is limited as the patient was noncompliant not allowing the sonographer to complete the study. The aortic valve is clearly hypomobile but with noncritical aortic valve stenosis.  · The mitral valve is calcified with a hypomobile posterior valve leaflet. The anterior leaflet appears to be opening well.

## 2022-06-13 NOTE — ASSESSMENT & PLAN NOTE
Witnessed bright red blood on diaper at nursing home and another darkened stool early morning per nursing team here.   Vital signs stable. Patient asymptomatic.   H/H has been trending down slowly from 12 to 10.6, 10.3; transfuse when pt becomes symptomatic or <7g/dL  Colonoscopy scheduled for Wednesday 6/15/22

## 2022-06-13 NOTE — SUBJECTIVE & OBJECTIVE
Interval History: Pt seen and evaluated    Review of Systems   Constitutional:  Positive for activity change and fatigue. Negative for chills and fever.   HENT: Negative.     Respiratory:  Negative for cough, chest tightness and shortness of breath.    Cardiovascular:  Negative for chest pain, palpitations and leg swelling.   Gastrointestinal:  Positive for blood in stool. Negative for abdominal distention, abdominal pain, constipation, diarrhea, nausea, rectal pain and vomiting.   Genitourinary:  Positive for dysuria.   Musculoskeletal:  Positive for arthralgias and gait problem.   Skin: Negative.    Neurological:  Negative for tremors, seizures, syncope, light-headedness, numbness and headaches.   Psychiatric/Behavioral:  Negative for agitation, behavioral problems and confusion.    All other systems reviewed and are negative.  Objective:     Vital Signs (Most Recent):  Temp: 98.5 °F (36.9 °C) (06/13/22 1153)  Pulse: (!) 121 (06/13/22 1153)  Resp: 18 (06/13/22 1153)  BP: (!) 160/71 (06/13/22 1153)  SpO2: 100 % (06/13/22 1153)   Vital Signs (24h Range):  Temp:  [98.5 °F (36.9 °C)-98.9 °F (37.2 °C)] 98.5 °F (36.9 °C)  Pulse:  [] 121  Resp:  [18-24] 18  SpO2:  [94 %-100 %] 100 %  BP: (134-196)/(62-85) 160/71     Weight: 90.7 kg (200 lb)  Body mass index is 35.43 kg/m².    Intake/Output Summary (Last 24 hours) at 6/13/2022 1238  Last data filed at 6/13/2022 0635  Gross per 24 hour   Intake 3269 ml   Output --   Net 3269 ml      Physical Exam  Vitals reviewed.   Constitutional:       General: She is not in acute distress.     Appearance: She is not ill-appearing or toxic-appearing.   HENT:      Right Ear: External ear normal.      Left Ear: External ear normal.      Mouth/Throat:      Mouth: Mucous membranes are dry.      Comments: edentulous  Eyes:      Extraocular Movements: Extraocular movements intact.   Cardiovascular:      Rate and Rhythm: Normal rate and regular rhythm.      Pulses: Normal pulses.       Heart sounds: Normal heart sounds. No murmur heard.  Pulmonary:      Effort: Pulmonary effort is normal. No respiratory distress.      Breath sounds: No wheezing or rales.   Abdominal:      General: Bowel sounds are normal. There is no distension.      Palpations: Abdomen is soft.      Tenderness: There is no abdominal tenderness. There is no right CVA tenderness, left CVA tenderness, guarding or rebound.   Musculoskeletal:         General: No swelling.      Cervical back: Normal range of motion and neck supple.      Right lower leg: No edema.      Left lower leg: No edema.      Comments: Right leg contraction, flexed, at hip and knees, hip externally rotated. Left leg with strength and reduced ROM.   Skin:     General: Skin is warm and dry.      Capillary Refill: Capillary refill takes less than 2 seconds.      Findings: No erythema, lesion or rash.   Neurological:      General: No focal deficit present.      Mental Status: She is alert and oriented to person, place, and time. Mental status is at baseline.   Psychiatric:         Mood and Affect: Mood normal.         Behavior: Behavior normal.         Thought Content: Thought content normal.       Significant Labs: All pertinent labs within the past 24 hours have been reviewed.    CBC:   Recent Labs   Lab 06/11/22  1705 06/12/22  0644 06/13/22  0716   WBC 6.58 6.81 7.54   HGB 10.0* 10.1* 8.4*   HCT 31.1* 31.3* 25.9*    218 214     CMP:   Recent Labs   Lab 06/13/22  0716   *   K 3.7   *   CO2 26   *   BUN 30*   CREATININE 0.7   CALCIUM 8.5*   PROT 5.1*   ALBUMIN 2.4*   BILITOT 0.3   ALKPHOS 71   AST 6*   ALT 11   ANIONGAP 8   EGFRNONAA >60.0     Urine Culture: No results for input(s): LABURIN in the last 48 hours.    Urine Studies:   Recent Labs   Lab 06/12/22  1501   COLORU Yellow   APPEARANCEUA Cloudy*   PHUR 6.0   SPECGRAV 1.020   PROTEINUA Negative   GLUCUA Negative   KETONESU Trace*   BILIRUBINUA Negative   OCCULTUA 3+*   NITRITE  Positive*   UROBILINOGEN 1.0   LEUKOCYTESUR 3+*   RBCUA 1   WBCUA 82*   BACTERIA Few*   SQUAMEPITHEL 1   HYALINECASTS 31.8*       Significant Imaging: I have reviewed all pertinent imaging results/findings within the past 24 hours.

## 2022-06-13 NOTE — ASSESSMENT & PLAN NOTE
-Will attempt bowel prep tomorrow for colonoscopy Wednesday   -Appreciate cardiology input for clearance   -Continue CLD   -Daily CBC - transfuse for Hgb > 8

## 2022-06-13 NOTE — SUBJECTIVE & OBJECTIVE
Interval History:   Patient seen and examined.  One episode of melena overnight.  Combative and confused.  Refusing prep.  UA suggestive of UTI and rocephin started.  VSS; afebrile.  Hgb 8.4 from 10.1    Medications:  Continuous Infusions:   lactated ringers 100 mL/hr at 06/13/22 0504     Scheduled Meds:   amLODIPine  5 mg Oral Daily    atorvastatin  20 mg Oral Daily    baclofen  10 mg Oral TID    cefTRIAXone (ROCEPHIN) IVPB  1 g Intravenous Q12H    donepeziL  10 mg Oral QHS    famotidine (PF)  20 mg Intravenous BID    fentaNYL  1 patch Transdermal Q72H    fluticasone propionate  1 spray Each Nostril Daily    gabapentin  100 mg Oral QHS    levothyroxine  125 mcg Oral Before breakfast    pantoprazole  40 mg Intravenous Daily    pramipexole  1 mg Oral TID    sertraline  50 mg Oral Daily     PRN Meds:acetaminophen, albuterol-ipratropium, melatonin, ondansetron, phenyleph-min oil-petrolatum, sodium chloride 0.9%     Review of patient's allergies indicates:   Allergen Reactions    Codeine      Objective:     Vital Signs (Most Recent):  Temp: 98.5 °F (36.9 °C) (06/13/22 1153)  Pulse: (!) 121 (06/13/22 1153)  Resp: 18 (06/13/22 1153)  BP: (!) 160/71 (06/13/22 1153)  SpO2: 100 % (06/13/22 1153)   Vital Signs (24h Range):  Temp:  [98.5 °F (36.9 °C)-98.9 °F (37.2 °C)] 98.5 °F (36.9 °C)  Pulse:  [] 121  Resp:  [18-24] 18  SpO2:  [94 %-100 %] 100 %  BP: (134-196)/(62-85) 160/71     Weight: 90.7 kg (200 lb)  Body mass index is 35.43 kg/m².    Intake/Output - Last 3 Shifts         06/11 0700  06/12 0659 06/12 0700  06/13 0659 06/13 0700  06/14 0659    P.O. 600 420 720    I.V. (mL/kg)  2799 (30.9)     IV Piggyback  50     Total Intake(mL/kg) 600 (6.6) 3269 (36) 720 (7.9)    Urine (mL/kg/hr)       Total Output       Net +600 +3269 +720           Urine Occurrence 2 x 6 x 5 x    Stool Occurrence 5 x 6 x 3 x            Physical Exam  Constitutional:       General: She is not in acute distress.     Appearance: She is not  ill-appearing or toxic-appearing.   Cardiovascular:      Rate and Rhythm: Normal rate and regular rhythm.   Abdominal:      General: There is no distension.      Palpations: Abdomen is soft.      Tenderness: There is no abdominal tenderness. There is no guarding or rebound.   Skin:     Capillary Refill: Capillary refill takes less than 2 seconds.   Neurological:      Mental Status: She is disoriented.       Significant Labs:  I have reviewed all pertinent lab results within the past 24 hours.  CBC:   Recent Labs   Lab 06/13/22  0716   WBC 7.54   RBC 2.89*   HGB 8.4*   HCT 25.9*      MCV 90   MCH 29.1   MCHC 32.4     CMP:   Recent Labs   Lab 06/13/22  0716   *   CALCIUM 8.5*   ALBUMIN 2.4*   PROT 5.1*   *   K 3.7   CO2 26   *   BUN 30*   CREATININE 0.7   ALKPHOS 71   ALT 11   AST 6*   BILITOT 0.3       Significant Diagnostics:  I have reviewed all pertinent imaging results/findings within the past 24 hours.

## 2022-06-13 NOTE — PROGRESS NOTES
[facility] Surg  General Surgery  Progress Note    Subjective:     History of Present Illness:  85yo female with PMH sig for hypothyroidism, Parkinson's, HDL, and DMII who presents from nursing home with episode of BRBPR.  Does not remember if she's ever had a colonoscopy.  Vaguely remembers distant history of diverticular bleed, but does not remember how it was treated.  Upon arrival, VSS; with Hgb 12.  Repeat labs this AM with Hgb 10 with an additional bloody bowel movement.  General surgery has been consulted for evaluation.       Post-Op Info:  Procedure(s) (LRB):  COLONOSCOPY (N/A)         Interval History:   Patient seen and examined.  One episode of melena overnight.  Combative and confused.  Refusing prep.  UA suggestive of UTI and rocephin started.  VSS; afebrile.  Hgb 8.4 from 10.1    Medications:  Continuous Infusions:   lactated ringers 100 mL/hr at 06/13/22 0504     Scheduled Meds:   amLODIPine  5 mg Oral Daily    atorvastatin  20 mg Oral Daily    baclofen  10 mg Oral TID    cefTRIAXone (ROCEPHIN) IVPB  1 g Intravenous Q12H    donepeziL  10 mg Oral QHS    famotidine (PF)  20 mg Intravenous BID    fentaNYL  1 patch Transdermal Q72H    fluticasone propionate  1 spray Each Nostril Daily    gabapentin  100 mg Oral QHS    levothyroxine  125 mcg Oral Before breakfast    pantoprazole  40 mg Intravenous Daily    pramipexole  1 mg Oral TID    sertraline  50 mg Oral Daily     PRN Meds:acetaminophen, albuterol-ipratropium, melatonin, ondansetron, phenyleph-min oil-petrolatum, sodium chloride 0.9%     Review of patient's allergies indicates:   Allergen Reactions    Codeine      Objective:     Vital Signs (Most Recent):  Temp: 98.5 °F (36.9 °C) (06/13/22 1153)  Pulse: (!) 121 (06/13/22 1153)  Resp: 18 (06/13/22 1153)  BP: (!) 160/71 (06/13/22 1153)  SpO2: 100 % (06/13/22 1153)   Vital Signs (24h Range):  Temp:  [98.5 °F (36.9 °C)-98.9 °F (37.2 °C)] 98.5 °F (36.9 °C)  Pulse:  [] 121  Resp:   [18-24] 18  SpO2:  [94 %-100 %] 100 %  BP: (134-196)/(62-85) 160/71     Weight: 90.7 kg (200 lb)  Body mass index is 35.43 kg/m².    Intake/Output - Last 3 Shifts         06/11 0700  06/12 0659 06/12 0700  06/13 0659 06/13 0700  06/14 0659    P.O. 600 420 720    I.V. (mL/kg)  2799 (30.9)     IV Piggyback  50     Total Intake(mL/kg) 600 (6.6) 3269 (36) 720 (7.9)    Urine (mL/kg/hr)       Total Output       Net +600 +3269 +720           Urine Occurrence 2 x 6 x 5 x    Stool Occurrence 5 x 6 x 3 x            Physical Exam  Constitutional:       General: She is not in acute distress.     Appearance: She is not ill-appearing or toxic-appearing.   Cardiovascular:      Rate and Rhythm: Normal rate and regular rhythm.   Abdominal:      General: There is no distension.      Palpations: Abdomen is soft.      Tenderness: There is no abdominal tenderness. There is no guarding or rebound.   Skin:     Capillary Refill: Capillary refill takes less than 2 seconds.   Neurological:      Mental Status: She is disoriented.       Significant Labs:  I have reviewed all pertinent lab results within the past 24 hours.  CBC:   Recent Labs   Lab 06/13/22  0716   WBC 7.54   RBC 2.89*   HGB 8.4*   HCT 25.9*      MCV 90   MCH 29.1   MCHC 32.4     CMP:   Recent Labs   Lab 06/13/22  0716   *   CALCIUM 8.5*   ALBUMIN 2.4*   PROT 5.1*   *   K 3.7   CO2 26   *   BUN 30*   CREATININE 0.7   ALKPHOS 71   ALT 11   AST 6*   BILITOT 0.3       Significant Diagnostics:  I have reviewed all pertinent imaging results/findings within the past 24 hours.    Assessment/Plan:     * Gastrointestinal hemorrhage  -Will attempt bowel prep tomorrow for colonoscopy Wednesday   -Appreciate cardiology input for clearance   -Continue CLD   -Daily CBC - transfuse for Hgb > 8        Shante Rey MD  General Surgery  [facility] S[facility]

## 2022-06-13 NOTE — NURSING
Pt is refusing to let US tech finish her test.  Convozine will notify Dr. Donnelly.      Pt refused to take oral meds. Pt was pulling away and becoming combative. Nurse was able to give iv meds with some resistance from patient. Nurse will continue to monitor.

## 2022-06-13 NOTE — PLAN OF CARE
Patient did not meet IP admission criteria upon admission On Saturday 6/11, Was discussed with Dr. Patterson, Dr. Patterson in agreement with changing patient to observation status.  Code 44 obtained.

## 2022-06-14 NOTE — ASSESSMENT & PLAN NOTE
Body mass index is 35.43 kg/m². Morbid obesity complicates all aspects of disease management from diagnostic modalities to treatment. Weight loss encouraged and health benefits explained to patient.          No

## 2022-06-14 NOTE — ASSESSMENT & PLAN NOTE
Hgb <7g/dL; nurse reports bright red blood per rectum - transfuse 2 units PRBC and recheck CBC following transfusion.  Colonoscopy scheduled for Wednesday 6/15/22     Report called to Agnes on telemetry. Moving to Covid floor. Patient was Covid positive after rapid collected to be placed. Patient was not on isolation at the time of test.

## 2022-06-14 NOTE — SUBJECTIVE & OBJECTIVE
Interval History: Pt seen and evaluated    Review of Systems   Constitutional:  Positive for activity change and fatigue. Negative for chills and fever.   HENT: Negative.     Respiratory:  Negative for cough, chest tightness and shortness of breath.    Cardiovascular:  Negative for chest pain, palpitations and leg swelling.   Gastrointestinal:  Positive for blood in stool. Negative for abdominal distention, abdominal pain, constipation, diarrhea, nausea, rectal pain and vomiting.   Genitourinary:  Positive for dysuria.   Musculoskeletal:  Positive for arthralgias and gait problem.   Skin: Negative.    Neurological:  Negative for tremors, seizures, syncope, light-headedness, numbness and headaches.   Psychiatric/Behavioral:  Negative for agitation, behavioral problems and confusion.    All other systems reviewed and are negative.  Objective:     Vital Signs (Most Recent):  Temp: 98.6 °F (37 °C) (06/14/22 1338)  Pulse: 99 (06/14/22 1338)  Resp: 18 (06/14/22 1338)  BP: 110/65 (06/14/22 1338)  SpO2: 99 % (06/14/22 1338)   Vital Signs (24h Range):  Temp:  [97.2 °F (36.2 °C)-98.8 °F (37.1 °C)] 98.6 °F (37 °C)  Pulse:  [82-99] 99  Resp:  [18-24] 18  SpO2:  [96 %-100 %] 99 %  BP: (100-161)/(54-72) 110/65     Weight: 90.7 kg (200 lb)  Body mass index is 35.43 kg/m².    Intake/Output Summary (Last 24 hours) at 6/14/2022 1344  Last data filed at 6/14/2022 0500  Gross per 24 hour   Intake 2020 ml   Output --   Net 2020 ml        Physical Exam  Vitals reviewed.   Constitutional:       General: She is not in acute distress.     Appearance: She is not ill-appearing or toxic-appearing.   HENT:      Right Ear: External ear normal.      Left Ear: External ear normal.      Mouth/Throat:      Mouth: Mucous membranes are dry.      Comments: edentulous  Eyes:      Extraocular Movements: Extraocular movements intact.   Cardiovascular:      Rate and Rhythm: Normal rate and regular rhythm.      Pulses: Normal pulses.      Heart sounds:  Normal heart sounds. No murmur heard.  Pulmonary:      Effort: Pulmonary effort is normal. No respiratory distress.      Breath sounds: No wheezing or rales.   Abdominal:      General: Bowel sounds are normal. There is no distension.      Palpations: Abdomen is soft.      Tenderness: There is no abdominal tenderness. There is no right CVA tenderness, left CVA tenderness, guarding or rebound.   Musculoskeletal:         General: No swelling.      Cervical back: Normal range of motion and neck supple.      Right lower leg: No edema.      Left lower leg: No edema.      Comments: Right leg contraction, flexed, at hip and knees, hip externally rotated. Left leg with strength and reduced ROM.   Skin:     General: Skin is warm and dry.      Capillary Refill: Capillary refill takes less than 2 seconds.      Findings: No erythema, lesion or rash.   Neurological:      General: No focal deficit present.      Mental Status: She is alert and oriented to person, place, and time. Mental status is at baseline.   Psychiatric:         Mood and Affect: Mood normal.         Behavior: Behavior normal.         Thought Content: Thought content normal.       Significant Labs: All pertinent labs within the past 24 hours have been reviewed.    CBC:   Recent Labs   Lab 06/13/22  0716 06/14/22  0714   WBC 7.54 7.91   HGB 8.4* 6.7*   HCT 25.9* 21.6*    208       CMP:   Recent Labs   Lab 06/13/22  0716 06/14/22  0714   * 148*   K 3.7 3.6   * 113*   CO2 26 26   * 290*   BUN 30* 26*   CREATININE 0.7 0.9   CALCIUM 8.5* 8.3*   PROT 5.1* 4.7*   ALBUMIN 2.4* 2.2*   BILITOT 0.3 0.3   ALKPHOS 71 63   AST 6* 8*   ALT 11 10   ANIONGAP 8 9   EGFRNONAA >60.0 58.9*       Urine Culture:   Recent Labs   Lab 06/12/22  1501   LABURIN GRAM NEGATIVE ALEX  > 100,000 cfu/ml  Identification and susceptibility pending  *       Urine Studies:   Recent Labs   Lab 06/12/22  1501   COLORU Yellow   APPEARANCEUA Cloudy*   PHUR 6.0   SPECGRAV 1.020    PROTEINUA Negative   GLUCUA Negative   KETONESU Trace*   BILIRUBINUA Negative   OCCULTUA 3+*   NITRITE Positive*   UROBILINOGEN 1.0   LEUKOCYTESUR 3+*   RBCUA 1   WBCUA 82*   BACTERIA Few*   SQUAMEPITHEL 1   HYALINECASTS 31.8*         Significant Imaging: I have reviewed all pertinent imaging results/findings within the past 24 hours.

## 2022-06-14 NOTE — PROGRESS NOTES
[facility] Medicine  Progress Note    Patient Name: Laisha Acosta  MRN: [id number]  Patient Class: IP- Inpatient   Admission Date: 6/10/2022  Length of Stay: 2 days  Attending Physician: Jo Underwood MD  Primary Care Provider: Jo Underwood MD        Subjective:     Principal Problem:Gastrointestinal hemorrhage        HPI:  86 year old female history of dementia, diabetes mellitis type 2, hypertension, obesity brought in by EMS from [facility] with bright red blood found on her diaper. Patient is primarily bed bound and requires two persons assist to get out of bed and into wheel chair.   Patient denies symptoms, no headaches, no dizziness, shortness of breath, palpitations. Patient has not had a colonoscopy in over 20 years.   Hemoglobin of 12 in the ED has been trended and has dropped to 10.5.   Patient is at her baseline mentation. Denies symptoms.   Will continue to observe and trend H/H. Consulted surgery for possible colonoscopy.           Overview/Hospital Course:  6/12/2022 FM:  Patient denies any further bleeding.  General surgery has the patient scheduled for endoscopy tomorrow.  Patient's initial admitting EKG was abnormal in general surgery is requesting cardiology clearance.  Will have them see her in the morning before surgery.  Patient is denying any chest pain or breathing trouble we will add a troponin to her blood work.  6/13 SD: Pt unable to have colonoscopy today - rescheduled for Wednesday 6/15/22. Pt denies any new bleeding.  6/14 SD: Hgb <7g/dL; nurse reports bright red blood per rectum - transfuse 2 units PRBC and recheck CBC following transfusion. Continue IV abx, following cultures. Colonoscopy scheduled for tomorrow 6/15/22.      Interval History: Pt seen and evaluated    Review of Systems   Constitutional:  Positive for activity change and fatigue. Negative for chills and fever.   HENT: Negative.     Respiratory:  Negative for cough, chest  tightness and shortness of breath.    Cardiovascular:  Negative for chest pain, palpitations and leg swelling.   Gastrointestinal:  Positive for blood in stool. Negative for abdominal distention, abdominal pain, constipation, diarrhea, nausea, rectal pain and vomiting.   Genitourinary:  Positive for dysuria.   Musculoskeletal:  Positive for arthralgias and gait problem.   Skin: Negative.    Neurological:  Negative for tremors, seizures, syncope, light-headedness, numbness and headaches.   Psychiatric/Behavioral:  Negative for agitation, behavioral problems and confusion.    All other systems reviewed and are negative.  Objective:     Vital Signs (Most Recent):  Temp: 98.6 °F (37 °C) (06/14/22 1338)  Pulse: 99 (06/14/22 1338)  Resp: 18 (06/14/22 1338)  BP: 110/65 (06/14/22 1338)  SpO2: 99 % (06/14/22 1338)   Vital Signs (24h Range):  Temp:  [97.2 °F (36.2 °C)-98.8 °F (37.1 °C)] 98.6 °F (37 °C)  Pulse:  [82-99] 99  Resp:  [18-24] 18  SpO2:  [96 %-100 %] 99 %  BP: (100-161)/(54-72) 110/65     Weight: 90.7 kg (200 lb)  Body mass index is 35.43 kg/m².    Intake/Output Summary (Last 24 hours) at 6/14/2022 1344  Last data filed at 6/14/2022 0500  Gross per 24 hour   Intake 2020 ml   Output --   Net 2020 ml        Physical Exam  Vitals reviewed.   Constitutional:       General: She is not in acute distress.     Appearance: She is not ill-appearing or toxic-appearing.   HENT:      Right Ear: External ear normal.      Left Ear: External ear normal.      Mouth/Throat:      Mouth: Mucous membranes are dry.      Comments: edentulous  Eyes:      Extraocular Movements: Extraocular movements intact.   Cardiovascular:      Rate and Rhythm: Normal rate and regular rhythm.      Pulses: Normal pulses.      Heart sounds: Normal heart sounds. No murmur heard.  Pulmonary:      Effort: Pulmonary effort is normal. No respiratory distress.      Breath sounds: No wheezing or rales.   Abdominal:      General: Bowel sounds are normal. There is  no distension.      Palpations: Abdomen is soft.      Tenderness: There is no abdominal tenderness. There is no right CVA tenderness, left CVA tenderness, guarding or rebound.   Musculoskeletal:         General: No swelling.      Cervical back: Normal range of motion and neck supple.      Right lower leg: No edema.      Left lower leg: No edema.      Comments: Right leg contraction, flexed, at hip and knees, hip externally rotated. Left leg with strength and reduced ROM.   Skin:     General: Skin is warm and dry.      Capillary Refill: Capillary refill takes less than 2 seconds.      Findings: No erythema, lesion or rash.   Neurological:      General: No focal deficit present.      Mental Status: She is alert and oriented to person, place, and time. Mental status is at baseline.   Psychiatric:         Mood and Affect: Mood normal.         Behavior: Behavior normal.         Thought Content: Thought content normal.       Significant Labs: All pertinent labs within the past 24 hours have been reviewed.    CBC:   Recent Labs   Lab 06/13/22  0716 06/14/22  0714   WBC 7.54 7.91   HGB 8.4* 6.7*   HCT 25.9* 21.6*    208       CMP:   Recent Labs   Lab 06/13/22  0716 06/14/22  0714   * 148*   K 3.7 3.6   * 113*   CO2 26 26   * 290*   BUN 30* 26*   CREATININE 0.7 0.9   CALCIUM 8.5* 8.3*   PROT 5.1* 4.7*   ALBUMIN 2.4* 2.2*   BILITOT 0.3 0.3   ALKPHOS 71 63   AST 6* 8*   ALT 11 10   ANIONGAP 8 9   EGFRNONAA >60.0 58.9*       Urine Culture:   Recent Labs   Lab 06/12/22  1501   LABURIN GRAM NEGATIVE ALEX  > 100,000 cfu/ml  Identification and susceptibility pending  *       Urine Studies:   Recent Labs   Lab 06/12/22  1501   COLORU Yellow   APPEARANCEUA Cloudy*   PHUR 6.0   SPECGRAV 1.020   PROTEINUA Negative   GLUCUA Negative   KETONESU Trace*   BILIRUBINUA Negative   OCCULTUA 3+*   NITRITE Positive*   UROBILINOGEN 1.0   LEUKOCYTESUR 3+*   RBCUA 1   WBCUA 82*   BACTERIA Few*   SQUAMEPITHEL 1    HYALINECASTS 31.8*         Significant Imaging: I have reviewed all pertinent imaging results/findings within the past 24 hours.      Assessment/Plan:      * Gastrointestinal hemorrhage  Hgb <7g/dL; nurse reports bright red blood per rectum - transfuse 2 units PRBC and recheck CBC following transfusion.  Colonoscopy scheduled for Wednesday 6/15/22      Acute cystitis with hematuria  Continue IV abx therapy - Rocephin  Following cultures      Other specified hypothyroidism  Continue home dose of levothyroxine      Age-related cognitive decline  Patient alert and oriented x3.   Continue with home meds.      Encephalopathy        BMI 35.0-35.9,adult  Body mass index is 35.43 kg/m². Morbid obesity complicates all aspects of disease management from diagnostic modalities to treatment. Weight loss encouraged and health benefits explained to patient.           DVT prophylaxis  SCDs  Holding oral anticoagulation S/T GI bleed        VTE Risk Mitigation (From admission, onward)         Ordered     IP VTE HIGH RISK PATIENT  Once         06/10/22 1602     Place sequential compression device  Until discontinued         06/10/22 1602                Discharge Planning   KIANA:      Code Status: Full Code   Is the patient medically ready for discharge?:     Reason for patient still in hospital (select all that apply): Patient trending condition, Laboratory test and Treatment                     Mary Beth Good NP  Department of Hospital Medicine   [facility] Surg

## 2022-06-15 NOTE — SUBJECTIVE & OBJECTIVE
Interval History:   Patient seen and examined.  Less combative today, yet overall more lethargic.  Somnolent but will wake up to physical stimulus.  Hgb 6.7 this AM with another episode of melena overnight.  Unable to obtain ROS due to patient somnolence     Medications:  Continuous Infusions:   lactated ringers 100 mL/hr at 06/14/22 0501     Scheduled Meds:   amLODIPine  5 mg Oral Daily    atorvastatin  20 mg Oral Daily    baclofen  10 mg Oral TID    cefTRIAXone (ROCEPHIN) IVPB  1 g Intravenous Q12H    donepeziL  10 mg Oral QHS    famotidine (PF)  20 mg Intravenous BID    fentaNYL  1 patch Transdermal Q72H    fluticasone propionate  1 spray Each Nostril Daily    gabapentin  100 mg Oral QHS    levothyroxine  125 mcg Oral Before breakfast    pantoprazole  40 mg Intravenous Daily    pramipexole  1 mg Oral TID    sertraline  50 mg Oral Daily     PRN Meds:sodium chloride, acetaminophen, albuterol-ipratropium, melatonin, ondansetron, phenyleph-min oil-petrolatum, sodium chloride 0.9%     Review of patient's allergies indicates:   Allergen Reactions    Codeine      Objective:     Vital Signs (Most Recent):  Temp: 98.7 °F (37.1 °C) (06/14/22 1917)  Pulse: 77 (06/14/22 1917)  Resp: 18 (06/14/22 1917)  BP: 133/64 (06/14/22 1917)  SpO2: 96 % (06/14/22 1917) Vital Signs (24h Range):  Temp:  [97.2 °F (36.2 °C)-99.1 °F (37.3 °C)] 98.7 °F (37.1 °C)  Pulse:  [77-99] 77  Resp:  [18-24] 18  SpO2:  [93 %-99 %] 96 %  BP: (100-161)/(53-70) 133/64     Weight: 90.7 kg (200 lb)  Body mass index is 35.43 kg/m².    Intake/Output - Last 3 Shifts         06/12 0700  06/13 0659 06/13 0700  06/14 0659 06/14 0700  06/15 0659    P.O. 420 820 360    I.V. (mL/kg) 2799 (30.9) 1150 (12.7)     Blood   605.8    IV Piggyback 50 50     Total Intake(mL/kg) 3269 (36) 2020 (22.3) 965.8 (10.6)    Net +3269 +2020 +965.8           Urine Occurrence 6 x 8 x 5 x    Stool Occurrence 6 x 5 x     Emesis Occurrence   1 x            Physical Exam  Constitutional:        General: She is not in acute distress.     Appearance: She is ill-appearing. She is not toxic-appearing.   Cardiovascular:      Rate and Rhythm: Normal rate and regular rhythm.   Pulmonary:      Effort: Pulmonary effort is normal. No respiratory distress.   Abdominal:      General: Abdomen is flat. There is no distension.      Palpations: Abdomen is soft.      Tenderness: There is no guarding or rebound.   Genitourinary:     Rectum: Guaiac result positive.   Skin:     General: Skin is warm and dry.      Capillary Refill: Capillary refill takes less than 2 seconds.   Neurological:      Mental Status: She is disoriented.       Significant Labs:  I have reviewed all pertinent lab results within the past 24 hours.  CBC:   Recent Labs   Lab 06/14/22  2049   WBC 10.01   RBC 3.04*   HGB 9.0*   HCT 27.7*      MCV 91   MCH 29.6   MCHC 32.5     BMP:   Recent Labs   Lab 06/14/22  0714   *   *   K 3.6   *   CO2 26   BUN 26*   CREATININE 0.9   CALCIUM 8.3*       Significant Diagnostics:  I have reviewed all pertinent imaging results/findings within the past 24 hours.

## 2022-06-15 NOTE — ASSESSMENT & PLAN NOTE
S/P Transfusion 2 units PRbC 6/14/2022  Colonoscopy scheduled for 6/15/22 cancelled S/T changed in level of consciousness

## 2022-06-15 NOTE — ASSESSMENT & PLAN NOTE
-Patient with increasing disorientation and somnolence.  Continues to have daily episodes of melena with downtrending Hgb  -Family concerns over patient's declining mental state and procedural anesthesia acknowledged and addressed  -Family aware that patient at moderate to high risk due to acuity of condition, mental status, and overall frailty   -To endo tomorrow for colonoscopy.  Will attempt scope regardless of quantity of prep consumed - pt on CLD for 4 days   -NPO midnight  -AM CBC - repeat Hgb 9 after 2 units PRBC

## 2022-06-15 NOTE — PROGRESS NOTES
[facility] Medicine  Progress Note    Patient Name: Laisha Acosta  MRN: [id number]  Patient Class: IP- Inpatient   Admission Date: 6/10/2022  Length of Stay: 3 days  Attending Physician: Jo Underwood MD  Primary Care Provider: Jo Underwood MD        Subjective:     Principal Problem:Gastrointestinal hemorrhage        HPI:  86 year old female history of dementia, diabetes mellitis type 2, hypertension, obesity brought in by EMS from [facility] with bright red blood found on her diaper. Patient is primarily bed bound and requires two persons assist to get out of bed and into wheel chair.   Patient denies symptoms, no headaches, no dizziness, shortness of breath, palpitations. Patient has not had a colonoscopy in over 20 years.   Hemoglobin of 12 in the ED has been trended and has dropped to 10.5.   Patient is at her baseline mentation. Denies symptoms.   Will continue to observe and trend H/H. Consulted surgery for possible colonoscopy.           Overview/Hospital Course:  6/12/2022 FM:  Patient denies any further bleeding.  General surgery has the patient scheduled for endoscopy tomorrow.  Patient's initial admitting EKG was abnormal in general surgery is requesting cardiology clearance.  Will have them see her in the morning before surgery.  Patient is denying any chest pain or breathing trouble we will add a troponin to her blood work.  6/13 SD: Pt unable to have colonoscopy today - rescheduled for Wednesday 6/15/22. Pt denies any new bleeding.  6/14 SD: Hgb <7g/dL; nurse reports bright red blood per rectum - transfuse 2 units PRBC and recheck CBC following transfusion. Continue IV abx, following cultures. Colonoscopy scheduled for tomorrow 6/15/22.  6/15 SD: S/P transfusion 2 units PRBC 6/14/22 - H&H improved following transfusion. Pt drowsy today - D/C pain medicines, ABG, and CXR.  Colonoscopy cancelled S/T pt's decreased level of consciousness this morning. Fluids  changed to 0.45% saline S/T hypernatremia.      Interval History: Pt seen and evaluated    Review of Systems   Unable to perform ROS: Mental status change   Objective:     Vital Signs (Most Recent):  Temp: 98.1 °F (36.7 °C) (06/15/22 0729)  Pulse: 84 (06/15/22 0729)  Resp: 20 (06/15/22 0729)  BP: (!) 165/73 (06/15/22 0729)  SpO2: 95 % (06/15/22 0757)   Vital Signs (24h Range):  Temp:  [97.8 °F (36.6 °C)-99.4 °F (37.4 °C)] 98.1 °F (36.7 °C)  Pulse:  [58-99] 84  Resp:  [16-20] 20  SpO2:  [93 %-99 %] 95 %  BP: (100-186)/(53-98) 165/73     Weight: 90.7 kg (200 lb)  Body mass index is 35.43 kg/m².    Intake/Output Summary (Last 24 hours) at 6/15/2022 0958  Last data filed at 6/15/2022 0500  Gross per 24 hour   Intake 2291.62 ml   Output --   Net 2291.62 ml        Physical Exam  Vitals reviewed.   Constitutional:       General: She is not in acute distress.     Appearance: She is not ill-appearing or toxic-appearing.   HENT:      Right Ear: External ear normal.      Left Ear: External ear normal.      Mouth/Throat:      Mouth: Mucous membranes are dry.      Comments: edentulous  Eyes:      Extraocular Movements: Extraocular movements intact.   Cardiovascular:      Rate and Rhythm: Normal rate and regular rhythm.      Pulses: Normal pulses.      Heart sounds: Normal heart sounds. No murmur heard.  Pulmonary:      Effort: Pulmonary effort is normal. No respiratory distress.      Breath sounds: No wheezing or rales.   Abdominal:      General: Bowel sounds are normal. There is no distension.      Palpations: Abdomen is soft.      Tenderness: There is no abdominal tenderness. There is no right CVA tenderness, left CVA tenderness, guarding or rebound.   Musculoskeletal:         General: No swelling.      Cervical back: Normal range of motion and neck supple.      Right lower leg: No edema.      Left lower leg: No edema.      Comments: Right leg contraction, flexed, at hip and knees, hip externally rotated. Left leg with  strength and reduced ROM.   Skin:     General: Skin is warm and dry.      Capillary Refill: Capillary refill takes less than 2 seconds.      Findings: No erythema, lesion or rash.   Neurological:      General: No focal deficit present.      Mental Status: She is alert and oriented to person, place, and time. Mental status is at baseline.   Psychiatric:         Mood and Affect: Mood normal.         Behavior: Behavior normal.         Thought Content: Thought content normal.       Significant Labs: All pertinent labs within the past 24 hours have been reviewed.    CBC:   Recent Labs   Lab 06/14/22 0714 06/14/22 2049 06/15/22  0521   WBC 7.91 10.01 8.02   HGB 6.7* 9.0* 8.3*   HCT 21.6* 27.7* 25.7*    185 175       CMP:   Recent Labs   Lab 06/14/22  0714 06/15/22  0521   * 152*   K 3.6 3.8   * 119*   CO2 26 28   * 264*   BUN 26* 22   CREATININE 0.9 0.7   CALCIUM 8.3* 7.7*   PROT 4.7* 4.5*   ALBUMIN 2.2* 2.1*   BILITOT 0.3 0.4   ALKPHOS 63 68   AST 8* 5*   ALT 10 9*   ANIONGAP 9 5*   EGFRNONAA 58.9* >60.0               Significant Imaging: I have reviewed all pertinent imaging results/findings within the past 24 hours.      Assessment/Plan:      * Gastrointestinal hemorrhage  S/P Transfusion 2 units PRbC 6/14/2022  Colonoscopy scheduled for 6/15/22 cancelled S/T changed in level of consciousness      Acute cystitis with hematuria  Urine culture positive for E. Coli, sensitive to current abx Rocephin      Other specified hypothyroidism  Continue home dose of levothyroxine      Age-related cognitive decline  Continue with home meds      Encephalopathy  Decrease level of consciousness this morning compared to previous. Investigating cause.      BMI 35.0-35.9,adult  Body mass index is 35.43 kg/m². Morbid obesity complicates all aspects of disease management from diagnostic modalities to treatment. Weight loss encouraged and health benefits explained to patient.           DVT  prophylaxis  SCDs  Holding oral anticoagulation S/T GI bleed        VTE Risk Mitigation (From admission, onward)         Ordered     IP VTE HIGH RISK PATIENT  Once         06/10/22 1602     Place sequential compression device  Until discontinued         06/10/22 1602                Discharge Planning   KIANA:      Code Status: Full Code   Is the patient medically ready for discharge?:     Reason for patient still in hospital (select all that apply): Patient trending condition, Laboratory test and Treatment                     Mary Beth Good NP  Department of Hospital Medicine   [facility] Surg

## 2022-06-15 NOTE — PROGRESS NOTES
[facility] [address] S[facility]  General Surgery  Progress Note    Subjective:     History of Present Illness:  87yo female with PMH sig for hypothyroidism, Parkinson's, HDL, and DMII who presents from nursing home with episode of BRBPR.  Does not remember if she's ever had a colonoscopy.  Vaguely remembers distant history of diverticular bleed, but does not remember how it was treated.  Upon arrival, VSS; with Hgb 12.  Repeat labs this AM with Hgb 10 with an additional bloody bowel movement.  General surgery has been consulted for evaluation.       Post-Op Info:  Procedure(s) (LRB):  COLONOSCOPY (N/A)         Interval History:   Patient seen and examined.  Hgb 8.3 after 2 units PRBC.  Two Bms overnight without melena per night shift.  Patient somnolent and minimally aroused with sternal rub.  Unable to obtain ROS.     Medications:  Continuous Infusions:   sodium chloride 0.45% 75 mL/hr at 06/15/22 0944     Scheduled Meds:   amLODIPine  5 mg Oral Daily    atorvastatin  20 mg Oral Daily    cefTRIAXone (ROCEPHIN) IVPB  1 g Intravenous Q12H    donepeziL  10 mg Oral QHS    famotidine (PF)  20 mg Intravenous BID    fluticasone propionate  1 spray Each Nostril Daily    levothyroxine  125 mcg Oral Before breakfast    pantoprazole  40 mg Intravenous Daily    pramipexole  1 mg Oral TID    sertraline  50 mg Oral Daily     PRN Meds:sodium chloride, acetaminophen, albuterol-ipratropium, melatonin, ondansetron, phenyleph-min oil-petrolatum, sodium chloride 0.9%     Review of patient's allergies indicates:   Allergen Reactions    Codeine      Objective:     Vital Signs (Most Recent):  Temp: 98.6 °F (37 °C) (06/15/22 1627)  Pulse: 93 (06/15/22 1627)  Resp: (!) 24 (06/15/22 1627)  BP: (!) 147/70 (06/15/22 1627)  SpO2: 98 % (06/15/22 1627)   Vital Signs (24h Range):  Temp:  [97.2 °F (36.2 °C)-99.4 °F (37.4 °C)] 98.6 °F (37 °C)  Pulse:  [56-93] 93  Resp:  [16-24] 24  SpO2:  [95 %-98 %] 98 %  BP: (110-186)/(58-98) 147/70     Weight:  90.7 kg (200 lb)  Body mass index is 35.43 kg/m².    Intake/Output - Last 3 Shifts         06/13 0700  06/14 0659 06/14 0700  06/15 0659 06/15 0700  06/16 0659    P.O. 820 420     I.V. (mL/kg) 1150 (12.7) 1150 (12.7)     Blood  671.6     IV Piggyback 50 50     Total Intake(mL/kg) 2020 (22.3) 2291.6 (25.3)     Net +2020 +2291.6            Urine Occurrence 8 x 7 x     Stool Occurrence 5 x 3 x     Emesis Occurrence  1 x             Physical Exam  Constitutional:       General: She is not in acute distress.     Appearance: She is obese. She is ill-appearing. She is not toxic-appearing.      Comments: Somnolent.  GCS 8; withdraws to pain with sternal rub    Cardiovascular:      Rate and Rhythm: Normal rate and regular rhythm.   Pulmonary:      Effort: No respiratory distress.   Abdominal:      General: Abdomen is flat. There is no distension.      Palpations: Abdomen is soft.      Tenderness: There is no abdominal tenderness. There is no guarding or rebound.   Skin:     General: Skin is warm and dry.      Capillary Refill: Capillary refill takes less than 2 seconds.       Significant Labs:  I have reviewed all pertinent lab results within the past 24 hours.  CBC:   Recent Labs   Lab 06/15/22  0521   WBC 8.02   RBC 2.81*   HGB 8.3*   HCT 25.7*      MCV 92   MCH 29.5   MCHC 32.3     BMP:   Recent Labs   Lab 06/15/22  0521   *   *   K 3.8   *   CO2 28   BUN 22   CREATININE 0.7   CALCIUM 7.7*       Significant Diagnostics:  I have reviewed all pertinent imaging results/findings within the past 24 hours.      Assessment/Plan:     * Gastrointestinal hemorrhage  -Hbg stable without episodes of melena today  -Colonoscopy deferred today due to family concerns and deteriorating mental status   -Continue trending H/H  -Family aware that intervention may be necessary if patient continues to bleed and require additional blood products  -Continue CLD; may advance tomorrow if Hgb remains stable without further  episodes of bleeding   -Fentanyl patch and gabapentin discontinued due to severe sedation   -Will continue to follow           Shante eRy MD  General Surgery  [facility]

## 2022-06-15 NOTE — PROGRESS NOTES
[facility] S[facility]  General Surgery  Progress Note    Subjective:     History of Present Illness:  87yo female with PMH sig for hypothyroidism, Parkinson's, HDL, and DMII who presents from nursing home with episode of BRBPR.  Does not remember if she's ever had a colonoscopy.  Vaguely remembers distant history of diverticular bleed, but does not remember how it was treated.  Upon arrival, VSS; with Hgb 12.  Repeat labs this AM with Hgb 10 with an additional bloody bowel movement.  General surgery has been consulted for evaluation.       Post-Op Info:  Procedure(s) (LRB):  COLONOSCOPY (N/A)         Interval History:   Patient seen and examined.  Less combative today, yet overall more lethargic.  Somnolent but will wake up to physical stimulus.  Hgb 6.7 this AM with another episode of melena overnight.  Unable to obtain ROS due to patient somnolence     Medications:  Continuous Infusions:   lactated ringers 100 mL/hr at 06/14/22 0501     Scheduled Meds:   amLODIPine  5 mg Oral Daily    atorvastatin  20 mg Oral Daily    baclofen  10 mg Oral TID    cefTRIAXone (ROCEPHIN) IVPB  1 g Intravenous Q12H    donepeziL  10 mg Oral QHS    famotidine (PF)  20 mg Intravenous BID    fentaNYL  1 patch Transdermal Q72H    fluticasone propionate  1 spray Each Nostril Daily    gabapentin  100 mg Oral QHS    levothyroxine  125 mcg Oral Before breakfast    pantoprazole  40 mg Intravenous Daily    pramipexole  1 mg Oral TID    sertraline  50 mg Oral Daily     PRN Meds:sodium chloride, acetaminophen, albuterol-ipratropium, melatonin, ondansetron, phenyleph-min oil-petrolatum, sodium chloride 0.9%     Review of patient's allergies indicates:   Allergen Reactions    Codeine      Objective:     Vital Signs (Most Recent):  Temp: 98.7 °F (37.1 °C) (06/14/22 1917)  Pulse: 77 (06/14/22 1917)  Resp: 18 (06/14/22 1917)  BP: 133/64 (06/14/22 1917)  SpO2: 96 % (06/14/22 1917) Vital Signs (24h Range):  Temp:  [97.2 °F (36.2 °C)-99.1 °F  (37.3 °C)] 98.7 °F (37.1 °C)  Pulse:  [77-99] 77  Resp:  [18-24] 18  SpO2:  [93 %-99 %] 96 %  BP: (100-161)/(53-70) 133/64     Weight: 90.7 kg (200 lb)  Body mass index is 35.43 kg/m².    Intake/Output - Last 3 Shifts         06/12 0700 06/13 0659 06/13 0700 06/14 0659 06/14 0700  06/15 0659    P.O. 420 820 360    I.V. (mL/kg) 2799 (30.9) 1150 (12.7)     Blood   605.8    IV Piggyback 50 50     Total Intake(mL/kg) 3269 (36) 2020 (22.3) 965.8 (10.6)    Net +3269 +2020 +965.8           Urine Occurrence 6 x 8 x 5 x    Stool Occurrence 6 x 5 x     Emesis Occurrence   1 x            Physical Exam  Constitutional:       General: She is not in acute distress.     Appearance: She is ill-appearing. She is not toxic-appearing.   Cardiovascular:      Rate and Rhythm: Normal rate and regular rhythm.   Pulmonary:      Effort: Pulmonary effort is normal. No respiratory distress.   Abdominal:      General: Abdomen is flat. There is no distension.      Palpations: Abdomen is soft.      Tenderness: There is no guarding or rebound.   Genitourinary:     Rectum: Guaiac result positive.   Skin:     General: Skin is warm and dry.      Capillary Refill: Capillary refill takes less than 2 seconds.   Neurological:      Mental Status: She is disoriented.       Significant Labs:  I have reviewed all pertinent lab results within the past 24 hours.  CBC:   Recent Labs   Lab 06/14/22  2049   WBC 10.01   RBC 3.04*   HGB 9.0*   HCT 27.7*      MCV 91   MCH 29.6   MCHC 32.5     BMP:   Recent Labs   Lab 06/14/22  0714   *   *   K 3.6   *   CO2 26   BUN 26*   CREATININE 0.9   CALCIUM 8.3*       Significant Diagnostics:  I have reviewed all pertinent imaging results/findings within the past 24 hours.      Assessment/Plan:     * Gastrointestinal hemorrhage  -Patient with increasing disorientation and somnolence.  Continues to have daily episodes of melena with downtrending Hgb  -Family concerns over patient's declining mental  state and procedural anesthesia acknowledged and addressed  -Family aware that patient at moderate to high risk due to acuity of condition, mental status, and overall frailty   -To endo tomorrow for colonoscopy.  Will attempt scope regardless of quantity of prep consumed - pt on CLD for 4 days   -NPO midnight  -AM CBC - repeat Hgb 9 after 2 units PRBC           Shante Rey MD  General Surgery  [facility]

## 2022-06-15 NOTE — SUBJECTIVE & OBJECTIVE
Interval History:   Patient seen and examined.  Hgb 8.3 after 2 units PRBC.  Two Bms overnight without melena per night shift.  Patient somnolent and minimally aroused with sternal rub.  Unable to obtain ROS.     Medications:  Continuous Infusions:   sodium chloride 0.45% 75 mL/hr at 06/15/22 0944     Scheduled Meds:   amLODIPine  5 mg Oral Daily    atorvastatin  20 mg Oral Daily    cefTRIAXone (ROCEPHIN) IVPB  1 g Intravenous Q12H    donepeziL  10 mg Oral QHS    famotidine (PF)  20 mg Intravenous BID    fluticasone propionate  1 spray Each Nostril Daily    levothyroxine  125 mcg Oral Before breakfast    pantoprazole  40 mg Intravenous Daily    pramipexole  1 mg Oral TID    sertraline  50 mg Oral Daily     PRN Meds:sodium chloride, acetaminophen, albuterol-ipratropium, melatonin, ondansetron, phenyleph-min oil-petrolatum, sodium chloride 0.9%     Review of patient's allergies indicates:   Allergen Reactions    Codeine      Objective:     Vital Signs (Most Recent):  Temp: 98.6 °F (37 °C) (06/15/22 1627)  Pulse: 93 (06/15/22 1627)  Resp: (!) 24 (06/15/22 1627)  BP: (!) 147/70 (06/15/22 1627)  SpO2: 98 % (06/15/22 1627)   Vital Signs (24h Range):  Temp:  [97.2 °F (36.2 °C)-99.4 °F (37.4 °C)] 98.6 °F (37 °C)  Pulse:  [56-93] 93  Resp:  [16-24] 24  SpO2:  [95 %-98 %] 98 %  BP: (110-186)/(58-98) 147/70     Weight: 90.7 kg (200 lb)  Body mass index is 35.43 kg/m².    Intake/Output - Last 3 Shifts         06/13 0700  06/14 0659 06/14 0700  06/15 0659 06/15 0700 06/16 0659    P.O. 820 420     I.V. (mL/kg) 1150 (12.7) 1150 (12.7)     Blood  671.6     IV Piggyback 50 50     Total Intake(mL/kg) 2020 (22.3) 2291.6 (25.3)     Net +2020 +2291.6            Urine Occurrence 8 x 7 x     Stool Occurrence 5 x 3 x     Emesis Occurrence  1 x             Physical Exam  Constitutional:       General: She is not in acute distress.     Appearance: She is obese. She is ill-appearing. She is not toxic-appearing.      Comments: Somnolent.  GCS  8; withdraws to pain with sternal rub    Cardiovascular:      Rate and Rhythm: Normal rate and regular rhythm.   Pulmonary:      Effort: No respiratory distress.   Abdominal:      General: Abdomen is flat. There is no distension.      Palpations: Abdomen is soft.      Tenderness: There is no abdominal tenderness. There is no guarding or rebound.   Skin:     General: Skin is warm and dry.      Capillary Refill: Capillary refill takes less than 2 seconds.       Significant Labs:  I have reviewed all pertinent lab results within the past 24 hours.  CBC:   Recent Labs   Lab 06/15/22  0521   WBC 8.02   RBC 2.81*   HGB 8.3*   HCT 25.7*      MCV 92   MCH 29.5   MCHC 32.3     BMP:   Recent Labs   Lab 06/15/22  0521   *   *   K 3.8   *   CO2 28   BUN 22   CREATININE 0.7   CALCIUM 7.7*       Significant Diagnostics:  I have reviewed all pertinent imaging results/findings within the past 24 hours.

## 2022-06-15 NOTE — NURSING
Spoke with Dr. Puente about patient being lethargic and not able to drink her juan prep tonight for her procedure tomorrow. Pt. Vitals are stable /64 HR 80, O2 sat 97% on RA, last H/H 9.7/27.7. Pt. Is sleeping unless sternal rubbed. Dr. Rey says that she is going to scope her no matter what in the am because of her GI bleed. MD aware that she is unable to drink her juan prep tonight. Will continue to monitor.

## 2022-06-15 NOTE — SUBJECTIVE & OBJECTIVE
Interval History: Pt seen and evaluated    Review of Systems   Unable to perform ROS: Mental status change   Objective:     Vital Signs (Most Recent):  Temp: 98.1 °F (36.7 °C) (06/15/22 0729)  Pulse: 84 (06/15/22 0729)  Resp: 20 (06/15/22 0729)  BP: (!) 165/73 (06/15/22 0729)  SpO2: 95 % (06/15/22 0757)   Vital Signs (24h Range):  Temp:  [97.8 °F (36.6 °C)-99.4 °F (37.4 °C)] 98.1 °F (36.7 °C)  Pulse:  [58-99] 84  Resp:  [16-20] 20  SpO2:  [93 %-99 %] 95 %  BP: (100-186)/(53-98) 165/73     Weight: 90.7 kg (200 lb)  Body mass index is 35.43 kg/m².    Intake/Output Summary (Last 24 hours) at 6/15/2022 0958  Last data filed at 6/15/2022 0500  Gross per 24 hour   Intake 2291.62 ml   Output --   Net 2291.62 ml        Physical Exam  Vitals reviewed.   Constitutional:       General: She is not in acute distress.     Appearance: She is not ill-appearing or toxic-appearing.   HENT:      Right Ear: External ear normal.      Left Ear: External ear normal.      Mouth/Throat:      Mouth: Mucous membranes are dry.      Comments: edentulous  Eyes:      Extraocular Movements: Extraocular movements intact.   Cardiovascular:      Rate and Rhythm: Normal rate and regular rhythm.      Pulses: Normal pulses.      Heart sounds: Normal heart sounds. No murmur heard.  Pulmonary:      Effort: Pulmonary effort is normal. No respiratory distress.      Breath sounds: No wheezing or rales.   Abdominal:      General: Bowel sounds are normal. There is no distension.      Palpations: Abdomen is soft.      Tenderness: There is no abdominal tenderness. There is no right CVA tenderness, left CVA tenderness, guarding or rebound.   Musculoskeletal:         General: No swelling.      Cervical back: Normal range of motion and neck supple.      Right lower leg: No edema.      Left lower leg: No edema.      Comments: Right leg contraction, flexed, at hip and knees, hip externally rotated. Left leg with strength and reduced ROM.   Skin:     General: Skin is  warm and dry.      Capillary Refill: Capillary refill takes less than 2 seconds.      Findings: No erythema, lesion or rash.   Neurological:      General: No focal deficit present.      Mental Status: She is alert and oriented to person, place, and time. Mental status is at baseline.   Psychiatric:         Mood and Affect: Mood normal.         Behavior: Behavior normal.         Thought Content: Thought content normal.       Significant Labs: All pertinent labs within the past 24 hours have been reviewed.    CBC:   Recent Labs   Lab 06/14/22  0714 06/14/22 2049 06/15/22  0521   WBC 7.91 10.01 8.02   HGB 6.7* 9.0* 8.3*   HCT 21.6* 27.7* 25.7*    185 175       CMP:   Recent Labs   Lab 06/14/22 0714 06/15/22  0521   * 152*   K 3.6 3.8   * 119*   CO2 26 28   * 264*   BUN 26* 22   CREATININE 0.9 0.7   CALCIUM 8.3* 7.7*   PROT 4.7* 4.5*   ALBUMIN 2.2* 2.1*   BILITOT 0.3 0.4   ALKPHOS 63 68   AST 8* 5*   ALT 10 9*   ANIONGAP 9 5*   EGFRNONAA 58.9* >60.0               Significant Imaging: I have reviewed all pertinent imaging results/findings within the past 24 hours.

## 2022-06-15 NOTE — ASSESSMENT & PLAN NOTE
-Hbg stable without episodes of melena today  -Colonoscopy deferred today due to family concerns and deteriorating mental status   -Continue trending H/H  -Family aware that intervention may be necessary if patient continues to bleed and require additional blood products  -Continue CLD; may advance tomorrow if Hgb remains stable without further episodes of bleeding   -Fentanyl patch and gabapentin discontinued due to severe sedation   -Will continue to follow

## 2022-06-16 NOTE — PROGRESS NOTES
[facility] Medicine  Progress Note    Patient Name: Laisha Acosta  MRN: [id number]  Patient Class: IP- Inpatient   Admission Date: 6/10/2022  Length of Stay: 4 days  Attending Physician: Jo Underwood MD  Primary Care Provider: Jo Underwood MD        Subjective:     Principal Problem:Gastrointestinal hemorrhage        HPI:  86 year old female history of dementia, diabetes mellitis type 2, hypertension, obesity brought in by EMS from [facility] with bright red blood found on her diaper. Patient is primarily bed bound and requires two persons assist to get out of bed and into wheel chair.   Patient denies symptoms, no headaches, no dizziness, shortness of breath, palpitations. Patient has not had a colonoscopy in over 20 years.   Hemoglobin of 12 in the ED has been trended and has dropped to 10.5.   Patient is at her baseline mentation. Denies symptoms.   Will continue to observe and trend H/H. Consulted surgery for possible colonoscopy.           Overview/Hospital Course:  6/12/2022 FM:  Patient denies any further bleeding.  General surgery has the patient scheduled for endoscopy tomorrow.  Patient's initial admitting EKG was abnormal in general surgery is requesting cardiology clearance.  Will have them see her in the morning before surgery.  Patient is denying any chest pain or breathing trouble we will add a troponin to her blood work.  6/13 SD: Pt unable to have colonoscopy today - rescheduled for Wednesday 6/15/22. Pt denies any new bleeding.  6/14 SD: Hgb <7g/dL; nurse reports bright red blood per rectum - transfuse 2 units PRBC and recheck CBC following transfusion. Continue IV abx, following cultures. Colonoscopy scheduled for tomorrow 6/15/22.  6/15 SD: S/P transfusion 2 units PRBC 6/14/22 - H&H improved following transfusion. Pt drowsy today - D/C pain medicines, ABG, and CXR.  Colonoscopy cancelled S/T pt's decreased level of consciousness this morning. Fluids  changed to 0.45% saline S/T hypernatremia.  6/16 SD: Pt awake and alert today, eating a pop sickle. Blood counts remaining good.      Interval History: Pt seen and evaluated    Review of Systems   Unable to perform ROS: Dementia   Objective:     Vital Signs (Most Recent):  Temp: 98.3 °F (36.8 °C) (06/16/22 1137)  Pulse: 93 (06/16/22 1400)  Resp: (!) 22 (06/16/22 1137)  BP: (!) 205/77 (06/16/22 1137)  SpO2: 96 % (06/16/22 1137)   Vital Signs (24h Range):  Temp:  [98 °F (36.7 °C)-98.8 °F (37.1 °C)] 98.3 °F (36.8 °C)  Pulse:  [82-98] 93  Resp:  [18-24] 22  SpO2:  [95 %-98 %] 96 %  BP: (147-205)/(69-91) 205/77     Weight: 90.7 kg (200 lb)  Body mass index is 35.43 kg/m².    Intake/Output Summary (Last 24 hours) at 6/16/2022 1541  Last data filed at 6/16/2022 0400  Gross per 24 hour   Intake 1710 ml   Output --   Net 1710 ml        Physical Exam  Vitals reviewed.   Constitutional:       General: She is not in acute distress.     Appearance: She is not ill-appearing or toxic-appearing.   HENT:      Right Ear: External ear normal.      Left Ear: External ear normal.      Mouth/Throat:      Mouth: Mucous membranes are dry.      Comments: edentulous  Eyes:      Extraocular Movements: Extraocular movements intact.   Cardiovascular:      Rate and Rhythm: Normal rate and regular rhythm.      Pulses: Normal pulses.      Heart sounds: Normal heart sounds. No murmur heard.  Pulmonary:      Effort: Pulmonary effort is normal. No respiratory distress.      Breath sounds: No wheezing or rales.   Abdominal:      General: Bowel sounds are normal. There is no distension.      Palpations: Abdomen is soft.      Tenderness: There is no abdominal tenderness. There is no right CVA tenderness, left CVA tenderness, guarding or rebound.   Musculoskeletal:         General: No swelling.      Cervical back: Normal range of motion and neck supple.      Right lower leg: No edema.      Left lower leg: No edema.      Comments: Right leg contraction,  flexed, at hip and knees, hip externally rotated. Left leg with strength and reduced ROM.   Skin:     General: Skin is warm and dry.      Capillary Refill: Capillary refill takes less than 2 seconds.      Findings: No erythema, lesion or rash.   Neurological:      General: No focal deficit present.      Mental Status: She is alert and oriented to person, place, and time. Mental status is at baseline.   Psychiatric:         Mood and Affect: Mood normal.         Behavior: Behavior normal.         Thought Content: Thought content normal.       Significant Labs: All pertinent labs within the past 24 hours have been reviewed.    CBC:   Recent Labs   Lab 06/14/22  2049 06/15/22  0521 06/16/22  0410   WBC 10.01 8.02 9.08   HGB 9.0* 8.3* 8.7*   HCT 27.7* 25.7* 27.4*    175 190       CMP:   Recent Labs   Lab 06/15/22  0521 06/16/22  0410   * 149*   K 3.8 3.5   * 118*   CO2 28 25   * 240*   BUN 22 16   CREATININE 0.7 0.6   CALCIUM 7.7* 8.0*   PROT 4.5* 5.1*   ALBUMIN 2.1* 2.3*   BILITOT 0.4 0.5   ALKPHOS 68 82   AST 5* 7*   ALT 9* 10   ANIONGAP 5* 6*   EGFRNONAA >60.0 >60.0               Significant Imaging: I have reviewed all pertinent imaging results/findings within the past 24 hours.      Assessment/Plan:      * Gastrointestinal hemorrhage  S/P Transfusion 2 units PRBC 6/14/2022  Colonoscopy scheduled for 6/15/22 cancelled S/T changed in level of consciousness  Monitoring for active bleeding and daily labs      Acute cystitis with hematuria  Urine culture positive for E. Coli, sensitive to current abx Rocephin      Other specified hypothyroidism  Continue home dose of levothyroxine      Age-related cognitive decline  Continue with home meds      Encephalopathy  6/16: Level of consciousness improved compared to yesterday      BMI 35.0-35.9,adult  Body mass index is 35.43 kg/m². Morbid obesity complicates all aspects of disease management from diagnostic modalities to treatment. Weight loss  encouraged and health benefits explained to patient.           DVT prophylaxis  SCDs  Holding oral anticoagulation S/T GI bleed        VTE Risk Mitigation (From admission, onward)         Ordered     IP VTE HIGH RISK PATIENT  Once         06/10/22 1602     Place sequential compression device  Until discontinued         06/10/22 1602                Discharge Planning   KIANA:      Code Status: Full Code   Is the patient medically ready for discharge?:     Reason for patient still in hospital (select all that apply): Patient trending condition, Laboratory test and Treatment                     Mary Beth Good NP  Department of Hospital Medicine   [facility]

## 2022-06-16 NOTE — PLAN OF CARE
Pt. Resting in bed, no complaints of pain, turn q 2 hours, continuous IV fluids as ordered, disoriented to place, time, situation, makes needs known, incontinent of bowel and bladder, clear liquid diet, takes med whole, plan of care discussed with pt., no evidence of learning, will continue to monitor.

## 2022-06-16 NOTE — ASSESSMENT & PLAN NOTE
S/P Transfusion 2 units PRBC 6/14/2022  Colonoscopy scheduled for 6/15/22 cancelled S/T changed in level of consciousness  Monitoring for active bleeding and daily labs

## 2022-06-16 NOTE — SUBJECTIVE & OBJECTIVE
Interval History: Pt seen and evaluated    Review of Systems   Unable to perform ROS: Dementia   Objective:     Vital Signs (Most Recent):  Temp: 98.3 °F (36.8 °C) (06/16/22 1137)  Pulse: 93 (06/16/22 1400)  Resp: (!) 22 (06/16/22 1137)  BP: (!) 205/77 (06/16/22 1137)  SpO2: 96 % (06/16/22 1137)   Vital Signs (24h Range):  Temp:  [98 °F (36.7 °C)-98.8 °F (37.1 °C)] 98.3 °F (36.8 °C)  Pulse:  [82-98] 93  Resp:  [18-24] 22  SpO2:  [95 %-98 %] 96 %  BP: (147-205)/(69-91) 205/77     Weight: 90.7 kg (200 lb)  Body mass index is 35.43 kg/m².    Intake/Output Summary (Last 24 hours) at 6/16/2022 1541  Last data filed at 6/16/2022 0400  Gross per 24 hour   Intake 1710 ml   Output --   Net 1710 ml        Physical Exam  Vitals reviewed.   Constitutional:       General: She is not in acute distress.     Appearance: She is not ill-appearing or toxic-appearing.   HENT:      Right Ear: External ear normal.      Left Ear: External ear normal.      Mouth/Throat:      Mouth: Mucous membranes are dry.      Comments: edentulous  Eyes:      Extraocular Movements: Extraocular movements intact.   Cardiovascular:      Rate and Rhythm: Normal rate and regular rhythm.      Pulses: Normal pulses.      Heart sounds: Normal heart sounds. No murmur heard.  Pulmonary:      Effort: Pulmonary effort is normal. No respiratory distress.      Breath sounds: No wheezing or rales.   Abdominal:      General: Bowel sounds are normal. There is no distension.      Palpations: Abdomen is soft.      Tenderness: There is no abdominal tenderness. There is no right CVA tenderness, left CVA tenderness, guarding or rebound.   Musculoskeletal:         General: No swelling.      Cervical back: Normal range of motion and neck supple.      Right lower leg: No edema.      Left lower leg: No edema.      Comments: Right leg contraction, flexed, at hip and knees, hip externally rotated. Left leg with strength and reduced ROM.   Skin:     General: Skin is warm and dry.       Capillary Refill: Capillary refill takes less than 2 seconds.      Findings: No erythema, lesion or rash.   Neurological:      General: No focal deficit present.      Mental Status: She is alert and oriented to person, place, and time. Mental status is at baseline.   Psychiatric:         Mood and Affect: Mood normal.         Behavior: Behavior normal.         Thought Content: Thought content normal.       Significant Labs: All pertinent labs within the past 24 hours have been reviewed.    CBC:   Recent Labs   Lab 06/14/22  2049 06/15/22  0521 06/16/22  0410   WBC 10.01 8.02 9.08   HGB 9.0* 8.3* 8.7*   HCT 27.7* 25.7* 27.4*    175 190       CMP:   Recent Labs   Lab 06/15/22  0521 06/16/22  0410   * 149*   K 3.8 3.5   * 118*   CO2 28 25   * 240*   BUN 22 16   CREATININE 0.7 0.6   CALCIUM 7.7* 8.0*   PROT 4.5* 5.1*   ALBUMIN 2.1* 2.3*   BILITOT 0.4 0.5   ALKPHOS 68 82   AST 5* 7*   ALT 9* 10   ANIONGAP 5* 6*   EGFRNONAA >60.0 >60.0               Significant Imaging: I have reviewed all pertinent imaging results/findings within the past 24 hours.

## 2022-06-17 PROBLEM — E87.6 HYPOKALEMIA: Status: ACTIVE | Noted: 2022-01-01

## 2022-06-17 NOTE — PROGRESS NOTES
[facility] Medicine  Progress Note    Patient Name: Laisha Acosta  MRN: [id number]  Patient Class: IP- Inpatient   Admission Date: 6/10/2022  Length of Stay: 5 days  Attending Physician: Jo Underwood MD  Primary Care Provider: Jo Underwood MD        Subjective:     Principal Problem:Gastrointestinal hemorrhage        HPI:  86 year old female history of dementia, diabetes mellitis type 2, hypertension, obesity brought in by EMS from [facility] with bright red blood found on her diaper. Patient is primarily bed bound and requires two persons assist to get out of bed and into wheel chair.   Patient denies symptoms, no headaches, no dizziness, shortness of breath, palpitations. Patient has not had a colonoscopy in over 20 years.   Hemoglobin of 12 in the ED has been trended and has dropped to 10.5.   Patient is at her baseline mentation. Denies symptoms.   Will continue to observe and trend H/H. Consulted surgery for possible colonoscopy.           Overview/Hospital Course:  6/12/2022 FM:  Patient denies any further bleeding.  General surgery has the patient scheduled for endoscopy tomorrow.  Patient's initial admitting EKG was abnormal in general surgery is requesting cardiology clearance.  Will have them see her in the morning before surgery.  Patient is denying any chest pain or breathing trouble we will add a troponin to her blood work.  6/13 SD: Pt unable to have colonoscopy today - rescheduled for Wednesday 6/15/22. Pt denies any new bleeding.  6/14 SD: Hgb <7g/dL; nurse reports bright red blood per rectum - transfuse 2 units PRBC and recheck CBC following transfusion. Continue IV abx, following cultures. Colonoscopy scheduled for tomorrow 6/15/22.  6/15 SD: S/P transfusion 2 units PRBC 6/14/22 - H&H improved following transfusion. Pt drowsy today - D/C pain medicines, ABG, and CXR.  Colonoscopy cancelled S/T pt's decreased level of consciousness this morning. Fluids  changed to 0.45% saline S/T hypernatremia.  6/16 SD: Pt awake and alert today, eating a pop sickle. Blood counts remaining good.  6/17 SD: Pt awake and alert. Blood counts stable. Advance diet.      Interval History: Pt seen and evaluated    Review of Systems   Unable to perform ROS: Dementia   Objective:     Vital Signs (Most Recent):  Temp: 97.9 °F (36.6 °C) (06/17/22 0728)  Pulse: 90 (06/17/22 0740)  Resp: 18 (06/17/22 0740)  BP: (!) 182/79 (06/17/22 0728)  SpO2: 97 % (06/17/22 0740)   Vital Signs (24h Range):  Temp:  [97.9 °F (36.6 °C)-99 °F (37.2 °C)] 97.9 °F (36.6 °C)  Pulse:  [] 90  Resp:  [18-24] 18  SpO2:  [95 %-99 %] 97 %  BP: (154-205)/(66-91) 182/79     Weight: 90.7 kg (200 lb)  Body mass index is 35.43 kg/m².    Intake/Output Summary (Last 24 hours) at 6/17/2022 0903  Last data filed at 6/17/2022 0600  Gross per 24 hour   Intake 2258.75 ml   Output --   Net 2258.75 ml        Physical Exam  Vitals reviewed.   Constitutional:       General: She is not in acute distress.     Appearance: She is not ill-appearing or toxic-appearing.   HENT:      Right Ear: External ear normal.      Left Ear: External ear normal.      Mouth/Throat:      Mouth: Mucous membranes are dry.      Comments: edentulous  Eyes:      Extraocular Movements: Extraocular movements intact.   Cardiovascular:      Rate and Rhythm: Normal rate and regular rhythm.      Pulses: Normal pulses.      Heart sounds: Normal heart sounds. No murmur heard.  Pulmonary:      Effort: Pulmonary effort is normal. No respiratory distress.      Breath sounds: No wheezing or rales.   Abdominal:      General: Bowel sounds are normal. There is no distension.      Palpations: Abdomen is soft.      Tenderness: There is no abdominal tenderness. There is no right CVA tenderness, left CVA tenderness, guarding or rebound.   Musculoskeletal:         General: No swelling.      Cervical back: Normal range of motion and neck supple.      Right lower leg: No edema.       Left lower leg: No edema.      Comments: Right leg contraction, flexed, at hip and knees, hip externally rotated. Left leg with strength and reduced ROM.   Skin:     General: Skin is warm and dry.      Capillary Refill: Capillary refill takes less than 2 seconds.      Findings: No erythema, lesion or rash.   Neurological:      General: No focal deficit present.      Mental Status: She is alert and oriented to person, place, and time. Mental status is at baseline.   Psychiatric:         Mood and Affect: Mood normal.         Behavior: Behavior normal.         Thought Content: Thought content normal.       Significant Labs: All pertinent labs within the past 24 hours have been reviewed.    CBC:   Recent Labs   Lab 06/16/22  0410 06/17/22  0534   WBC 9.08 10.10   HGB 8.7* 9.0*   HCT 27.4* 27.7*    204       CMP:   Recent Labs   Lab 06/16/22 0410 06/17/22  0534   * 145   K 3.5 3.0*   * 115*   CO2 25 25   * 292*   BUN 16 9   CREATININE 0.6 0.6   CALCIUM 8.0* 7.9*   PROT 5.1* 5.3*   ALBUMIN 2.3* 2.3*   BILITOT 0.5 0.9   ALKPHOS 82 92   AST 7* 7*   ALT 10 12   ANIONGAP 6* 5*   EGFRNONAA >60.0 >60.0               Significant Imaging: I have reviewed all pertinent imaging results/findings within the past 24 hours.      Assessment/Plan:      * Gastrointestinal hemorrhage  S/P Transfusion 2 units PRBC 6/14/2022  Colonoscopy scheduled for 6/15/22 cancelled S/T changed in level of consciousness  Monitoring for active bleeding and daily labs  H&H stable      Acute cystitis with hematuria  Urine culture positive for E. Coli, sensitive to current abx Rocephin      Other specified hypothyroidism  Continue home dose of levothyroxine      Age-related cognitive decline  Continue with home meds      Encephalopathy  Awake and alert    BMI 35.0-35.9,adult  Body mass index is 35.43 kg/m². Morbid obesity complicates all aspects of disease management from diagnostic modalities to treatment. Weight loss encouraged  and health benefits explained to patient.           DVT prophylaxis  SCDs  Holding oral anticoagulation S/T GI bleed      Hypokalemia  Replete and monitor with daily labs        VTE Risk Mitigation (From admission, onward)         Ordered     IP VTE HIGH RISK PATIENT  Once         06/10/22 1602     Place sequential compression device  Until discontinued         06/10/22 1602                Discharge Planning   KIANA:      Code Status: Full Code   Is the patient medically ready for discharge?:     Reason for patient still in hospital (select all that apply): Patient new problem, Patient trending condition, Laboratory test and Treatment                     Mary Beth Good NP  Department of Hospital Medicine   [facility]

## 2022-06-17 NOTE — ASSESSMENT & PLAN NOTE
S/P Transfusion 2 units PRBC 6/14/2022  Colonoscopy scheduled for 6/15/22 cancelled S/T changed in level of consciousness  Monitoring for active bleeding and daily labs  H&H stable

## 2022-06-17 NOTE — PLAN OF CARE
06/17/22 1321   Medicare Message   Important Message from Medicare regarding Discharge Appeal Rights Explained to patient/caregiver;Other (comments)   Date IMM was signed 06/17/22   Time IMM was signed 1321   Spoke with patient's daughter, Kenzie Guido per phone,  Reviewed admission status as IP amd medicare IM appeal rights with daughter.  Verbal consent to document family informed of IMM appeal rights.  Copy of IM left at patient bedside.

## 2022-06-17 NOTE — SUBJECTIVE & OBJECTIVE
Interval History: Pt seen and evaluated    Review of Systems   Unable to perform ROS: Dementia   Objective:     Vital Signs (Most Recent):  Temp: 97.9 °F (36.6 °C) (06/17/22 0728)  Pulse: 90 (06/17/22 0740)  Resp: 18 (06/17/22 0740)  BP: (!) 182/79 (06/17/22 0728)  SpO2: 97 % (06/17/22 0740)   Vital Signs (24h Range):  Temp:  [97.9 °F (36.6 °C)-99 °F (37.2 °C)] 97.9 °F (36.6 °C)  Pulse:  [] 90  Resp:  [18-24] 18  SpO2:  [95 %-99 %] 97 %  BP: (154-205)/(66-91) 182/79     Weight: 90.7 kg (200 lb)  Body mass index is 35.43 kg/m².    Intake/Output Summary (Last 24 hours) at 6/17/2022 0903  Last data filed at 6/17/2022 0600  Gross per 24 hour   Intake 2258.75 ml   Output --   Net 2258.75 ml        Physical Exam  Vitals reviewed.   Constitutional:       General: She is not in acute distress.     Appearance: She is not ill-appearing or toxic-appearing.   HENT:      Right Ear: External ear normal.      Left Ear: External ear normal.      Mouth/Throat:      Mouth: Mucous membranes are dry.      Comments: edentulous  Eyes:      Extraocular Movements: Extraocular movements intact.   Cardiovascular:      Rate and Rhythm: Normal rate and regular rhythm.      Pulses: Normal pulses.      Heart sounds: Normal heart sounds. No murmur heard.  Pulmonary:      Effort: Pulmonary effort is normal. No respiratory distress.      Breath sounds: No wheezing or rales.   Abdominal:      General: Bowel sounds are normal. There is no distension.      Palpations: Abdomen is soft.      Tenderness: There is no abdominal tenderness. There is no right CVA tenderness, left CVA tenderness, guarding or rebound.   Musculoskeletal:         General: No swelling.      Cervical back: Normal range of motion and neck supple.      Right lower leg: No edema.      Left lower leg: No edema.      Comments: Right leg contraction, flexed, at hip and knees, hip externally rotated. Left leg with strength and reduced ROM.   Skin:     General: Skin is warm and dry.       Capillary Refill: Capillary refill takes less than 2 seconds.      Findings: No erythema, lesion or rash.   Neurological:      General: No focal deficit present.      Mental Status: She is alert and oriented to person, place, and time. Mental status is at baseline.   Psychiatric:         Mood and Affect: Mood normal.         Behavior: Behavior normal.         Thought Content: Thought content normal.       Significant Labs: All pertinent labs within the past 24 hours have been reviewed.    CBC:   Recent Labs   Lab 06/16/22  0410 06/17/22  0534   WBC 9.08 10.10   HGB 8.7* 9.0*   HCT 27.4* 27.7*    204       CMP:   Recent Labs   Lab 06/16/22  0410 06/17/22  0534   * 145   K 3.5 3.0*   * 115*   CO2 25 25   * 292*   BUN 16 9   CREATININE 0.6 0.6   CALCIUM 8.0* 7.9*   PROT 5.1* 5.3*   ALBUMIN 2.3* 2.3*   BILITOT 0.5 0.9   ALKPHOS 82 92   AST 7* 7*   ALT 10 12   ANIONGAP 6* 5*   EGFRNONAA >60.0 >60.0               Significant Imaging: I have reviewed all pertinent imaging results/findings within the past 24 hours.

## 2022-06-17 NOTE — PHYSICIAN QUERY
"PT Name: Laisha Acosta  MR #: [id number]     DOCUMENTATION CLARIFICATION   Sophie Blakely RN, CCDS    [email or web address]    Documentation Excellence  This form is a permanent document in the medical record.     Query Date: June 17, 2022    By submitting this query, we are merely seeking further clarification of documentation.    Please utilize your independent clinical judgment when addressing the question(s) below.      The Medical Record contains the following:  Clinical Information Location in Medical Records   Ceftriaxone IVPB start 6/12   mar    06/12/22 15:01   Specimen UA Urine, Clean Catch   Color, UA Yellow   Appearance, UA Cloudy !   Specific Gravity, UA 1.020   pH, UA 6.0   Protein, UA Negative   Glucose, UA Negative   Ketones, UA Trace !   Occult Blood UA 3+ !   NITRITE UA Positive !   UROBILINOGEN UA 1.0   Bilirubin (UA) Negative   Leukocytes, UA 3+ !   RBC, UA 1   WBC, UA 82 (H)   Bacteria, UA Few !   Squam Epithel, UA 1   Hyaline Casts, UA 31.8 !    lab   Encephalopathy     refusing meds  combative     "Cotn to tx uti causign confusion"      UTI day 2 of IV abx Rocephin    Encephalopathy    Acute cystitis w/hematuria   Cscope cancelled  d/t sleepiness     HYPERNATREMIA  UCx + for E.Coli  Encephalopathy    Decrease level of consciousness this morning compared to previous. Investigating cause.    Less combative today, yet overall more lethargic.    Somnolent but will wake up to physical stimulus.    GCS 8; withdraws to pain with sternal rub    Fentanyl patch and gabapentin discontinued due to severe sedation     more alert and back to [facility]   Hosp PN 6/12    RN Nsg 6/13    Hosp Pn 6/13          Hosp pn 6/14              Hosp PN 6/15        Hosp Pn 6/16      nursing home with bright red blood found on her diaper.   Patient is primarily bed bound and requires two persons assist to get out of bed and into wheel chair.  H&P     According to coding guidelines, Present on Admission is defined as " NIKIA?  Routing to MD   "present at the time the order for inpatient admission occurs. Conditions that develop during an outpatient encounter, including emergency department, observation, or outpatient surgery, are considered as present on admission.       Please clarify the Present on Admission (POA) status of the diagnosis: "UTI" & "acute cystitis".     [ x ] Yes (Y)   [  ] No (N)   [  ] Documentation insufficient to determine if condition is POA (U)   [  ] Clinically Undetermined (W)     Reference:  ICD-10-CM Official Guidelines for Coding and Reporting FY 2021. (2020). Retrieved October 21, 2020, from https://www.cdc.gov/nchs/data/icd/10cmguidelines-FY2021.pdf?fbclid=[email or web address]    Form No. [id number]     "

## 2022-06-17 NOTE — PLAN OF CARE
Plan of care reviewed with pt. At bedside, no evidence of learning, disoriented to time, situation, place, clear liquid diet, no bloody stools noted last night, continuous IV fluids as ordered, takes meds whole, able to verbalize needs, forgetful at time, confused at times, instructed to call for needs/assistance, will continue to monitor.

## 2022-06-18 NOTE — ASSESSMENT & PLAN NOTE
S/P Transfusion 2 units PRBC 6/14/2022  Colonoscopy scheduled for 6/15/22 cancelled S/T changed in level of consciousness  H&H stable with no signs/symptoms of active bleeding

## 2022-06-18 NOTE — PROGRESS NOTES
[facility] Medicine  Progress Note    Patient Name: Laisha Acosta  MRN: [id number]  Patient Class: IP- Inpatient   Admission Date: 6/10/2022  Length of Stay: 6 days  Attending Physician: Jo Underwood MD  Primary Care Provider: Jo Underwood MD        Subjective:     Principal Problem:Gastrointestinal hemorrhage        HPI:  86 year old female history of dementia, diabetes mellitis type 2, hypertension, obesity brought in by EMS from [facility] with bright red blood found on her diaper. Patient is primarily bed bound and requires two persons assist to get out of bed and into wheel chair.   Patient denies symptoms, no headaches, no dizziness, shortness of breath, palpitations. Patient has not had a colonoscopy in over 20 years.   Hemoglobin of 12 in the ED has been trended and has dropped to 10.5.   Patient is at her baseline mentation. Denies symptoms.   Will continue to observe and trend H/H. Consulted surgery for possible colonoscopy.           Overview/Hospital Course:  6/12/2022 FM:  Patient denies any further bleeding.  General surgery has the patient scheduled for endoscopy tomorrow.  Patient's initial admitting EKG was abnormal in general surgery is requesting cardiology clearance.  Will have them see her in the morning before surgery.  Patient is denying any chest pain or breathing trouble we will add a troponin to her blood work.  6/13 SD: Pt unable to have colonoscopy today - rescheduled for Wednesday 6/15/22. Pt denies any new bleeding.  6/14 SD: Hgb <7g/dL; nurse reports bright red blood per rectum - transfuse 2 units PRBC and recheck CBC following transfusion. Continue IV abx, following cultures. Colonoscopy scheduled for tomorrow 6/15/22.  6/15 SD: S/P transfusion 2 units PRBC 6/14/22 - H&H improved following transfusion. Pt drowsy today - D/C pain medicines, ABG, and CXR.  Colonoscopy cancelled S/T pt's decreased level of consciousness this morning. Fluids  changed to 0.45% saline S/T hypernatremia.  6/16 SD: Pt awake and alert today, eating a pop sickle. Blood counts remaining good.  6/17 SD: Pt awake and alert. Blood counts stable. Advance diet.  6/18 SD: Pt awake and alert. H&H stable with no active bleeding. Continue IV abx for UTI. Adjust repletion of potassium. Adjust insulin for elevated blood sugars.      Interval History: Pt seen and evaluated    Review of Systems   Unable to perform ROS: Dementia   Objective:     Vital Signs (Most Recent):  Temp: 98.6 °F (37 °C) (06/18/22 0801)  Pulse: 90 (06/18/22 0829)  Resp: 18 (06/18/22 0829)  BP: (!) 160/69 (06/18/22 0801)  SpO2: 97 % (06/18/22 0829)   Vital Signs (24h Range):  Temp:  [97.9 °F (36.6 °C)-98.8 °F (37.1 °C)] 98.6 °F (37 °C)  Pulse:  [] 90  Resp:  [18-24] 18  SpO2:  [95 %-99 %] 97 %  BP: (139-182)/(65-85) 160/69     Weight: 90.7 kg (200 lb)  Body mass index is 35.43 kg/m².    Intake/Output Summary (Last 24 hours) at 6/18/2022 1101  Last data filed at 6/18/2022 0500  Gross per 24 hour   Intake 3423 ml   Output --   Net 3423 ml        Physical Exam  Vitals reviewed.   Constitutional:       General: She is not in acute distress.     Appearance: She is not ill-appearing or toxic-appearing.   HENT:      Right Ear: External ear normal.      Left Ear: External ear normal.      Mouth/Throat:      Mouth: Mucous membranes are dry.      Comments: edentulous  Eyes:      Extraocular Movements: Extraocular movements intact.   Cardiovascular:      Rate and Rhythm: Normal rate and regular rhythm.      Pulses: Normal pulses.      Heart sounds: Normal heart sounds. No murmur heard.  Pulmonary:      Effort: Pulmonary effort is normal. No respiratory distress.      Breath sounds: No wheezing or rales.   Abdominal:      General: Bowel sounds are normal. There is no distension.      Palpations: Abdomen is soft.      Tenderness: There is no abdominal tenderness. There is no right CVA tenderness, left CVA tenderness, guarding  or rebound.   Musculoskeletal:         General: No swelling.      Cervical back: Normal range of motion and neck supple.      Right lower leg: No edema.      Left lower leg: No edema.      Comments: Right leg contraction, flexed, at hip and knees, hip externally rotated. Left leg with strength and reduced ROM.   Skin:     General: Skin is warm and dry.      Capillary Refill: Capillary refill takes less than 2 seconds.      Findings: No erythema, lesion or rash.   Neurological:      General: No focal deficit present.      Mental Status: She is alert and oriented to person, place, and time. Mental status is at baseline.   Psychiatric:         Mood and Affect: Mood normal.         Behavior: Behavior normal.         Thought Content: Thought content normal.       Significant Labs: All pertinent labs within the past 24 hours have been reviewed.    CBC:   Recent Labs   Lab 06/17/22  0534 06/18/22  0543   WBC 10.10 10.85   HGB 9.0* 9.3*   HCT 27.7* 29.4*    217       CMP:   Recent Labs   Lab 06/17/22  0534 06/18/22  0543    146*   K 3.0* 3.2*   * 116*   CO2 25 24   * 322*   BUN 9 6*   CREATININE 0.6 0.6   CALCIUM 7.9* 7.9*   PROT 5.3* 5.2*   ALBUMIN 2.3* 2.2*   BILITOT 0.9 0.5   ALKPHOS 92 97   AST 7* 6*   ALT 12 10   ANIONGAP 5* 6*   EGFRNONAA >60.0 >60.0               Significant Imaging: I have reviewed all pertinent imaging results/findings within the past 24 hours.      Assessment/Plan:      * Gastrointestinal hemorrhage  S/P Transfusion 2 units PRBC 6/14/2022  Colonoscopy scheduled for 6/15/22 cancelled S/T changed in level of consciousness  H&H stable with no signs/symptoms of active bleeding      Acute cystitis with hematuria  Urine culture positive for E. Coli, sensitive to current abx  6/18: Day 7 of IV abx therapy with Rocephin      Other specified hypothyroidism  Continue home dose of levothyroxine      Age-related cognitive decline  Continue with home meds      Encephalopathy  Awake  and alert    BMI 35.0-35.9,adult  Body mass index is 35.43 kg/m². Morbid obesity complicates all aspects of disease management from diagnostic modalities to treatment. Weight loss encouraged and health benefits explained to patient.           Hypokalemia  Replete and monitor with daily labs. Adjust repletion PRN.      DVT prophylaxis  SCDs  Holding oral anticoagulation S/T GI bleed        VTE Risk Mitigation (From admission, onward)         Ordered     IP VTE HIGH RISK PATIENT  Once         06/10/22 1602     Place sequential compression device  Until discontinued         06/10/22 1602                Discharge Planning   KIANA:      Code Status: Full Code   Is the patient medically ready for discharge?:     Reason for patient still in hospital (select all that apply): Patient trending condition, Laboratory test and Treatment                     Mary Beth Good NP  Department of Hospital Medicine   [facility]

## 2022-06-18 NOTE — SUBJECTIVE & OBJECTIVE
Interval History: Pt seen and evaluated    Review of Systems   Unable to perform ROS: Dementia   Objective:     Vital Signs (Most Recent):  Temp: 98.6 °F (37 °C) (06/18/22 0801)  Pulse: 90 (06/18/22 0829)  Resp: 18 (06/18/22 0829)  BP: (!) 160/69 (06/18/22 0801)  SpO2: 97 % (06/18/22 0829)   Vital Signs (24h Range):  Temp:  [97.9 °F (36.6 °C)-98.8 °F (37.1 °C)] 98.6 °F (37 °C)  Pulse:  [] 90  Resp:  [18-24] 18  SpO2:  [95 %-99 %] 97 %  BP: (139-182)/(65-85) 160/69     Weight: 90.7 kg (200 lb)  Body mass index is 35.43 kg/m².    Intake/Output Summary (Last 24 hours) at 6/18/2022 1101  Last data filed at 6/18/2022 0500  Gross per 24 hour   Intake 3423 ml   Output --   Net 3423 ml        Physical Exam  Vitals reviewed.   Constitutional:       General: She is not in acute distress.     Appearance: She is not ill-appearing or toxic-appearing.   HENT:      Right Ear: External ear normal.      Left Ear: External ear normal.      Mouth/Throat:      Mouth: Mucous membranes are dry.      Comments: edentulous  Eyes:      Extraocular Movements: Extraocular movements intact.   Cardiovascular:      Rate and Rhythm: Normal rate and regular rhythm.      Pulses: Normal pulses.      Heart sounds: Normal heart sounds. No murmur heard.  Pulmonary:      Effort: Pulmonary effort is normal. No respiratory distress.      Breath sounds: No wheezing or rales.   Abdominal:      General: Bowel sounds are normal. There is no distension.      Palpations: Abdomen is soft.      Tenderness: There is no abdominal tenderness. There is no right CVA tenderness, left CVA tenderness, guarding or rebound.   Musculoskeletal:         General: No swelling.      Cervical back: Normal range of motion and neck supple.      Right lower leg: No edema.      Left lower leg: No edema.      Comments: Right leg contraction, flexed, at hip and knees, hip externally rotated. Left leg with strength and reduced ROM.   Skin:     General: Skin is warm and dry.       Capillary Refill: Capillary refill takes less than 2 seconds.      Findings: No erythema, lesion or rash.   Neurological:      General: No focal deficit present.      Mental Status: She is alert and oriented to person, place, and time. Mental status is at baseline.   Psychiatric:         Mood and Affect: Mood normal.         Behavior: Behavior normal.         Thought Content: Thought content normal.       Significant Labs: All pertinent labs within the past 24 hours have been reviewed.    CBC:   Recent Labs   Lab 06/17/22  0534 06/18/22  0543   WBC 10.10 10.85   HGB 9.0* 9.3*   HCT 27.7* 29.4*    217       CMP:   Recent Labs   Lab 06/17/22  0534 06/18/22  0543    146*   K 3.0* 3.2*   * 116*   CO2 25 24   * 322*   BUN 9 6*   CREATININE 0.6 0.6   CALCIUM 7.9* 7.9*   PROT 5.3* 5.2*   ALBUMIN 2.3* 2.2*   BILITOT 0.9 0.5   ALKPHOS 92 97   AST 7* 6*   ALT 12 10   ANIONGAP 5* 6*   EGFRNONAA >60.0 >60.0               Significant Imaging: I have reviewed all pertinent imaging results/findings within the past 24 hours.

## 2022-06-18 NOTE — ASSESSMENT & PLAN NOTE
Urine culture positive for E. Coli, sensitive to current abx  6/18: Day 7 of IV abx therapy with Rocephin

## 2022-06-19 NOTE — PLAN OF CARE
Plan of care reviewed with the patient. No evidence of understanding. Patient is still confused and think she is at the nursing home. Explained of patient where she is and redirects patient.

## 2022-06-19 NOTE — PLAN OF CARE
Patient exhibits no s/s's of GI bleed at present, denies any c/o pain, sacral breakdown w/protective mepilex border in place. Patient care plan reviewed and no changes made. Patient plan of care appropriate for present diagnosis.

## 2022-06-19 NOTE — PROGRESS NOTES
[facility] Medicine  Progress Note    Patient Name: Laisha Acosta  MRN: [id number]  Patient Class: IP- Inpatient   Admission Date: 6/10/2022  Length of Stay: 7 days  Attending Physician: Jo Underwood MD  Primary Care Provider: Jo Underwood MD        Subjective:     Principal Problem:Gastrointestinal hemorrhage        HPI:  86 year old female history of dementia, diabetes mellitis type 2, hypertension, obesity brought in by EMS from [facility] with bright red blood found on her diaper. Patient is primarily bed bound and requires two persons assist to get out of bed and into wheel chair.   Patient denies symptoms, no headaches, no dizziness, shortness of breath, palpitations. Patient has not had a colonoscopy in over 20 years.   Hemoglobin of 12 in the ED has been trended and has dropped to 10.5.   Patient is at her baseline mentation. Denies symptoms.   Will continue to observe and trend H/H. Consulted surgery for possible colonoscopy.           Overview/Hospital Course:  6/12/2022 FM:  Patient denies any further bleeding.  General surgery has the patient scheduled for endoscopy tomorrow.  Patient's initial admitting EKG was abnormal in general surgery is requesting cardiology clearance.  Will have them see her in the morning before surgery.  Patient is denying any chest pain or breathing trouble we will add a troponin to her blood work.  6/13 SD: Pt unable to have colonoscopy today - rescheduled for Wednesday 6/15/22. Pt denies any new bleeding.  6/14 SD: Hgb <7g/dL; nurse reports bright red blood per rectum - transfuse 2 units PRBC and recheck CBC following transfusion. Continue IV abx, following cultures. Colonoscopy scheduled for tomorrow 6/15/22.  6/15 SD: S/P transfusion 2 units PRBC 6/14/22 - H&H improved following transfusion. Pt drowsy today - D/C pain medicines, ABG, and CXR.  Colonoscopy cancelled S/T pt's decreased level of consciousness this morning. Fluids  changed to 0.45% saline S/T hypernatremia.  6/16 SD: Pt awake and alert today, eating a pop sickle. Blood counts remaining good.  6/17 SD: Pt awake and alert. Blood counts stable. Advance diet.  6/18 SD: Pt awake and alert. H&H stable with no active bleeding. Continue IV abx for UTI. Adjust repletion of potassium. Adjust insulin for elevated blood sugars.  6/19 SD: Pt awake and alert. Holding conversation today. H&H stable with no active bleeding. Continue IV abx for UTI. Blood sugar improved.      Interval History: Pt seen and evaluated    Review of Systems   Unable to perform ROS: Dementia   Objective:     Vital Signs (Most Recent):  Temp: 98.5 °F (36.9 °C) (06/19/22 0736)  Pulse: 95 (06/19/22 0755)  Resp: 20 (06/19/22 0755)  BP: (!) 121/54 (06/19/22 0736)  SpO2: 98 % (06/19/22 0755)   Vital Signs (24h Range):  Temp:  [97.3 °F (36.3 °C)-98.5 °F (36.9 °C)] 98.5 °F (36.9 °C)  Pulse:  [] 95  Resp:  [18-22] 20  SpO2:  [96 %-100 %] 98 %  BP: (116-151)/(53-71) 121/54     Weight: 90.7 kg (200 lb)  Body mass index is 35.43 kg/m².    Intake/Output Summary (Last 24 hours) at 6/19/2022 1046  Last data filed at 6/19/2022 0600  Gross per 24 hour   Intake 1540 ml   Output 200 ml   Net 1340 ml        Physical Exam  Vitals reviewed.   Constitutional:       General: She is not in acute distress.     Appearance: She is not ill-appearing or toxic-appearing.   HENT:      Right Ear: External ear normal.      Left Ear: External ear normal.      Mouth/Throat:      Mouth: Mucous membranes are dry.      Comments: edentulous  Eyes:      Extraocular Movements: Extraocular movements intact.   Cardiovascular:      Rate and Rhythm: Normal rate and regular rhythm.      Pulses: Normal pulses.      Heart sounds: Normal heart sounds. No murmur heard.  Pulmonary:      Effort: Pulmonary effort is normal. No respiratory distress.      Breath sounds: No wheezing or rales.   Abdominal:      General: Bowel sounds are normal. There is no  distension.      Palpations: Abdomen is soft.      Tenderness: There is no abdominal tenderness. There is no right CVA tenderness, left CVA tenderness, guarding or rebound.   Musculoskeletal:         General: No swelling.      Cervical back: Normal range of motion and neck supple.      Right lower leg: No edema.      Left lower leg: No edema.      Comments: Right leg contraction, flexed, at hip and knees, hip externally rotated. Left leg with strength and reduced ROM.   Skin:     General: Skin is warm and dry.      Capillary Refill: Capillary refill takes less than 2 seconds.      Findings: No erythema, lesion or rash.   Neurological:      General: No focal deficit present.      Mental Status: She is alert. Mental status is at baseline.   Psychiatric:         Mood and Affect: Mood normal.         Behavior: Behavior normal.         Thought Content: Thought content normal.       Significant Labs: All pertinent labs within the past 24 hours have been reviewed.    CBC:   Recent Labs   Lab 06/18/22  0543 06/19/22  0631   WBC 10.85 12.83*   HGB 9.3* 9.1*   HCT 29.4* 28.4*    240       CMP:   Recent Labs   Lab 06/18/22  0543 06/19/22  0631   * 147*   K 3.2* 3.5   * 119*   CO2 24 23   * 216*   BUN 6* 7*   CREATININE 0.6 0.6   CALCIUM 7.9* 8.1*   PROT 5.2* 5.1*   ALBUMIN 2.2* 2.1*   BILITOT 0.5 0.4   ALKPHOS 97 97   AST 6* 7*   ALT 10 11   ANIONGAP 6* 5*   EGFRNONAA >60.0 >60.0       POCT Glucose:   Recent Labs   Lab 06/18/22  1203 06/18/22  1712   POCTGLUCOSE 311* 184*             Significant Imaging: I have reviewed all pertinent imaging results/findings within the past 24 hours.      Assessment/Plan:      * Gastrointestinal hemorrhage  S/P Transfusion 2 units PRBC 6/14/2022  Colonoscopy scheduled for 6/15/22 cancelled S/T changed in level of consciousness  H&H stable with no signs/symptoms of active bleeding      Acute cystitis with hematuria  Urine culture positive for E. Coli, sensitive to  current abx  6/19: Day 8 of IV abx therapy with Rocephin      Other specified hypothyroidism  Continue home dose of levothyroxine      Age-related cognitive decline  Continue with home meds      Encephalopathy  Awake and alert    BMI 35.0-35.9,adult  Body mass index is 35.43 kg/m². Morbid obesity complicates all aspects of disease management from diagnostic modalities to treatment. Weight loss encouraged and health benefits explained to patient.           Hypokalemia  Replete and monitor with daily labs. Adjust repletion PRN.      DVT prophylaxis  SCDs  Holding oral anticoagulation S/T GI bleed        VTE Risk Mitigation (From admission, onward)         Ordered     IP VTE HIGH RISK PATIENT  Once         06/10/22 1602     Place sequential compression device  Until discontinued         06/10/22 1602                Discharge Planning   KIANA:      Code Status: Full Code   Is the patient medically ready for discharge?:     Reason for patient still in hospital (select all that apply): Patient trending condition, Laboratory test and Treatment                     Mary Beth Good NP  Department of Hospital Medicine   [facility]

## 2022-06-19 NOTE — SUBJECTIVE & OBJECTIVE
Interval History: Pt seen and evaluated    Review of Systems   Unable to perform ROS: Dementia   Objective:     Vital Signs (Most Recent):  Temp: 98.5 °F (36.9 °C) (06/19/22 0736)  Pulse: 95 (06/19/22 0755)  Resp: 20 (06/19/22 0755)  BP: (!) 121/54 (06/19/22 0736)  SpO2: 98 % (06/19/22 0755)   Vital Signs (24h Range):  Temp:  [97.3 °F (36.3 °C)-98.5 °F (36.9 °C)] 98.5 °F (36.9 °C)  Pulse:  [] 95  Resp:  [18-22] 20  SpO2:  [96 %-100 %] 98 %  BP: (116-151)/(53-71) 121/54     Weight: 90.7 kg (200 lb)  Body mass index is 35.43 kg/m².    Intake/Output Summary (Last 24 hours) at 6/19/2022 1046  Last data filed at 6/19/2022 0600  Gross per 24 hour   Intake 1540 ml   Output 200 ml   Net 1340 ml        Physical Exam  Vitals reviewed.   Constitutional:       General: She is not in acute distress.     Appearance: She is not ill-appearing or toxic-appearing.   HENT:      Right Ear: External ear normal.      Left Ear: External ear normal.      Mouth/Throat:      Mouth: Mucous membranes are dry.      Comments: edentulous  Eyes:      Extraocular Movements: Extraocular movements intact.   Cardiovascular:      Rate and Rhythm: Normal rate and regular rhythm.      Pulses: Normal pulses.      Heart sounds: Normal heart sounds. No murmur heard.  Pulmonary:      Effort: Pulmonary effort is normal. No respiratory distress.      Breath sounds: No wheezing or rales.   Abdominal:      General: Bowel sounds are normal. There is no distension.      Palpations: Abdomen is soft.      Tenderness: There is no abdominal tenderness. There is no right CVA tenderness, left CVA tenderness, guarding or rebound.   Musculoskeletal:         General: No swelling.      Cervical back: Normal range of motion and neck supple.      Right lower leg: No edema.      Left lower leg: No edema.      Comments: Right leg contraction, flexed, at hip and knees, hip externally rotated. Left leg with strength and reduced ROM.   Skin:     General: Skin is warm and  dry.      Capillary Refill: Capillary refill takes less than 2 seconds.      Findings: No erythema, lesion or rash.   Neurological:      General: No focal deficit present.      Mental Status: She is alert. Mental status is at baseline.   Psychiatric:         Mood and Affect: Mood normal.         Behavior: Behavior normal.         Thought Content: Thought content normal.       Significant Labs: All pertinent labs within the past 24 hours have been reviewed.    CBC:   Recent Labs   Lab 06/18/22  0543 06/19/22  0631   WBC 10.85 12.83*   HGB 9.3* 9.1*   HCT 29.4* 28.4*    240       CMP:   Recent Labs   Lab 06/18/22  0543 06/19/22  0631   * 147*   K 3.2* 3.5   * 119*   CO2 24 23   * 216*   BUN 6* 7*   CREATININE 0.6 0.6   CALCIUM 7.9* 8.1*   PROT 5.2* 5.1*   ALBUMIN 2.2* 2.1*   BILITOT 0.5 0.4   ALKPHOS 97 97   AST 6* 7*   ALT 10 11   ANIONGAP 6* 5*   EGFRNONAA >60.0 >60.0       POCT Glucose:   Recent Labs   Lab 06/18/22  1203 06/18/22  1712   POCTGLUCOSE 311* 184*             Significant Imaging: I have reviewed all pertinent imaging results/findings within the past 24 hours.

## 2022-06-19 NOTE — ASSESSMENT & PLAN NOTE
Urine culture positive for E. Coli, sensitive to current abx  6/19: Day 8 of IV abx therapy with Rocephin

## 2022-06-20 NOTE — DISCHARGE INSTRUCTIONS
Dr. Underwood will follow-up with you at [facility].    Monitor for any signs/symptoms of bleeding.    Repeat labs one week after discharge. These have been ordered at the hospital.    If signs and symptoms worsen or new signs and symptoms develop, call your doctor or go to the ED.

## 2022-06-20 NOTE — PROGRESS NOTES
Pharmacist Intervention IV to PO Note    Laisha Acosta is a 86 y.o. female being treated with IV medication famotidine    Patient Data:    Vital Signs (Most Recent):  Temp: 97.6 °F (36.4 °C) (06/20/22 0714)  Pulse: 84 (06/20/22 0714)  Resp: 20 (06/20/22 0714)  BP: (!) 130/58 (06/20/22 0714)  SpO2: 100 % (06/20/22 0714)   Vital Signs (72h Range):  Temp:  [97.3 °F (36.3 °C)-98.8 °F (37.1 °C)]   Pulse:  []   Resp:  [17-24]   BP: ()/(51-85)   SpO2:  [95 %-100 %]      CBC:  Recent Labs   Lab 06/18/22  0543 06/19/22  0631 06/20/22  0559   WBC 10.85 12.83* 7.59   RBC 3.19* 3.08* 2.91*   HGB 9.3* 9.1* 8.5*   HCT 29.4* 28.4* 27.5*    240 239   MCV 92 92 95   MCH 29.2 29.5 29.2   MCHC 31.6* 32.0 30.9*     CMP:     Recent Labs   Lab 06/18/22  0543 06/19/22  0631 06/20/22  0559   * 216* 219*   CALCIUM 7.9* 8.1* 7.9*   ALBUMIN 2.2* 2.1* 2.0*   PROT 5.2* 5.1* 5.0*   * 147* 146*   K 3.2* 3.5 4.1   CO2 24 23 24   * 119* 119*   BUN 6* 7* 9   CREATININE 0.6 0.6 0.8   ALKPHOS 97 97 92   ALT 10 11 10   AST 6* 7* 4*   BILITOT 0.5 0.4 0.2       Dietary Orders:  Diet Orders  Report           Diet diabetic [facility]; 1800 Calorie: Diabetic starting at 06/18 0846            Based on the following criteria, this patient qualifies for intravenous to oral conversion:  [x] The patients gastrointestinal tract is functioning (tolerating medications via oral or enteral route for 24 hours and tolerating food or enteral feeds for 24 hours).  [x] The patient is hemodynamically stable for 24 hours (heart rate <100 beats per minute, systolic blood pressure >99 mm Hg, and respiratory rate <20 breaths per minute).  [x] The patient shows clinical improvement (afebrile for at least 24 hours and white blood cell count downtrending or normalized). Additionally, the patient must be non-neutropenic (absolute neutrophil count >500 cells/mm3).  [x] For antimicrobials, the patient has received IV therapy for at least 24  hours.    IV medication famotidine will be changed to oral medication famotidine    Pharmacist's Name: Francy Alanis  Pharmacist's Extension: [phone]

## 2022-06-20 NOTE — PHYSICIAN QUERY
"PT Name: Laisha Acosta  MR #: [id number]    DOCUMENTATION CLARIFICATION   Sophie Blakely RN, CCDS    [email or web address]    Documentation Excellence  This form is a permanent document in the medical record.     Query Date: June 20, 2022    By submitting this query, we are merely seeking further clarification of documentation.   Please utilize your independent clinical judgment when addressing the question(s) below.    The Medical Record contains the following:   Indicators   Supporting Clinical Findings Location in Medical Record   x AMS, Confusion,  LOC, etc.  Encephalopathy    change in behavior, not talking, slightly combative.  Refusing meds, combative    "Cotn to tx uti causign confusion"                 is at her baseline mentation    Encephalopathy             Primarily bed bound with 2 person assist     Less combative today, yet overall more lethargic.     Somnolent but will wake up to physical stimulus.     GCS 8; withdraws to pain with sternal rub      more alert and back to basleine      Hosp PN 6/12-14 6/11  LPN Nsg  RN 6/13  Hosp PN 6/13          Gen Sx PN 6/15      Hosp Pn 6/16   x Acute/Chronic Illness Age-related cognitive decline  UTI day 2 of IV abx Rocephin  Acute cystitis with hematuria  Dementia  GI Hemorrhage   history of dementia, diabetes mellitis type 2, hypertension, obesity  6/11 Gen sx cn  Hosp PN 6/13  Hosp Pn 6/14  Hosp Pn 6/16  ED MD  H&P    Radiology Findings     x Electrolyte Imbalance hypokalemia [facility] Pn 6/17   x Medication Fentanyl patch and gabapentin discontinued due to severe sedation    Gen Sx PN 6/15   x Treatment         Colonoscopy scheduled for 6/15/22 cancelled S/T changed in level of consciousness  Investigating cause.    Fentanyl patch and gabapentin discontinued due to severe sedation     [facility] Pn 6/19    Hosp PN 6/14    Gen Sx PN 6/15    Other       The noted clinical guidelines are only system guidelines and do not replace the providers clinical " "judgment.    The National [place] of Neurologic Disorders and Stroke (NINDS) of the NIH describes encephalopathy as any diffuse disease of the brain that alters brain function or structure.    Provider, please specify type of "encephalopathy".                          Marshal all that apply.     [ X ] Metabolic Encephalopathy - Due to electrolyte imbalance, metabolic derangements, or infectious processes, includes Septic Encephalopathy, Uremic Encephalopathy   [   ] Toxic Encephalopathy - Due to drugs, chemicals, or other toxic substances      Due to Fentanyl & gabapentin     [   ] Toxic Encephalopathy - Due to drugs, chemicals, or other toxic substance      Other drugs - specify: __________________       [   ] Other Encephalopathy (please specify): ____________________   [   ] Other neurological condition- Includes Post-ictal altered mental status (please specify condition): __________   [   ]  Clinically Undetermined     Please document in your progress notes daily for the duration of treatment until resolved, and include in your discharge summary.    References:  BRENDAN Villatoro RN, CCDS. (2018, June 9). Notes from the Instructor: Encephalopathy tips. Retrieved October 22, 2020, from [email or web address]    ICD-9-CM Coding Clinic First Quarter 2013, Effective with discharges: October 21, 2013 [facility] § Seizure with encephalopathy due to postictal state (2013).    ICD-10-CM/[facility] Integrated Codebook (Version V.20.8.10.0) [Computer software]. (2020). Retrieved October 21, 2020.    National [place] of Neurological Disorders and Stroke. (2019, March 27). Retrieved October 22, 2020, from https://www.ninds.nih.gov/Disorders/All-Disorders/Dxcsjlccsmchhu-Lltapqmntpz-Yean    Form No. 18113  "

## 2022-06-20 NOTE — PLAN OF CARE
Pt being DC to [facility]. Report called to [facility]. They will provide transportation.

## 2022-06-20 NOTE — DISCHARGE SUMMARY
[facility]  Discharge Summary      Patient Name: Laisha Acosta  MRN: [id number]  Patient Class: IP- Inpatient  Admission Date: 6/10/2022  Hospital Length of Stay: 8 days  Discharge Date and Time:  06/20/2022 9:28 AM  Attending Physician: Jo Underwood MD   Discharging Provider: Mary Beth Good NP  Primary Care Provider: Jo Underwood MD      HPI:   86 year old female history of dementia, diabetes mellitis type 2, hypertension, obesity brought in by EMS from [facility] with bright red blood found on her diaper. Patient is primarily bed bound and requires two persons assist to get out of bed and into wheel chair.   Patient denies symptoms, no headaches, no dizziness, shortness of breath, palpitations. Patient has not had a colonoscopy in over 20 years.   Hemoglobin of 12 in the ED has been trended and has dropped to 10.5.   Patient is at her baseline mentation. Denies symptoms.   Will continue to observe and trend H/H. Consulted surgery for possible colonoscopy.           Procedure(s) (LRB):  COLONOSCOPY (N/A)      Hospital Course:   6/12/2022 FM:  Patient denies any further bleeding.  General surgery has the patient scheduled for endoscopy tomorrow.  Patient's initial admitting EKG was abnormal in general surgery is requesting cardiology clearance.  Will have them see her in the morning before surgery.  Patient is denying any chest pain or breathing trouble we will add a troponin to her blood work.  6/13 SD: Pt unable to have colonoscopy today - rescheduled for Wednesday 6/15/22. Pt denies any new bleeding.  6/14 SD: Hgb <7g/dL; nurse reports bright red blood per rectum - transfuse 2 units PRBC and recheck CBC following transfusion. Continue IV abx, following cultures. Colonoscopy scheduled for tomorrow 6/15/22.  6/15 SD: S/P transfusion 2 units PRBC 6/14/22 - H&H improved following transfusion. Pt drowsy today - D/C pain medicines, ABG, and CXR.  Colonoscopy  cancelled S/T pt's decreased level of consciousness this morning. Fluids changed to 0.45% saline S/T hypernatremia.  6/16 SD: Pt awake and alert today, eating a pop sickle. Blood counts remaining good.  6/17 SD: Pt awake and alert. Blood counts stable. Advance diet.  6/18 SD: Pt awake and alert. H&H stable with no active bleeding. Continue IV abx for UTI. Adjust repletion of potassium. Adjust insulin for elevated blood sugars.  6/19 SD: Pt awake and alert. Holding conversation today. H&H stable with no active bleeding. Continue IV abx for UTI. Blood sugar improved.  6/20 SD: Pt awake and alert, holding appropriate conversation. Mental status appears to be at baseline. H&H stable with no active bleeding. Discharge home today with close monitoring for bleeding. Resume UTI prophylactic as taking prior to hospital admit.       Goals of Care Treatment Preferences:  Code Status: Full Code      Consults:   Consults (From admission, onward)        Status Ordering Provider     Inpatient consult to Cardiology  Once        Provider:  Guille Donnelly MD    Completed EVAN NUNEZ III     Inpatient consult to General Surgery  Once        Provider:  Shante Rey MD    Completed ARLENE GUPTA          * Gastrointestinal hemorrhage  S/P Transfusion 2 units PRBC 6/14/2022  Colonoscopy scheduled for 6/15/22 cancelled S/T changed in level of consciousness  H&H stable with no signs/symptoms of active bleeding      Acute cystitis with hematuria  Urine culture positive for E. Coli, sensitive to current abx  6/20: Course of abx therapy complete. Discharge with prophylactic treatment.      Other specified hypothyroidism  Continue home dose of levothyroxine      Age-related cognitive decline  Continue with home meds      Encephalopathy  Awake and alert, holding appropriate conversation. Appears to be at baseline.    BMI 35.0-35.9,adult  Body mass index is 35.43 kg/m². Morbid obesity complicates all aspects of disease  management from diagnostic modalities to treatment. Weight loss encouraged and health benefits explained to patient.           Hypokalemia  Resolved    DVT prophylaxis  SCDs  Holding oral anticoagulation S/T GI bleed        Final Active Diagnoses:    Diagnosis Date Noted POA    PRINCIPAL PROBLEM:  Gastrointestinal hemorrhage [K92.2] 06/11/2022 Yes    Acute cystitis with hematuria [N30.01] 12/24/2021 Yes    Other specified hypothyroidism [E03.8] 06/11/2022 Yes    Age-related cognitive decline [R41.81] 06/11/2022 Yes    Encephalopathy [G93.40] 12/24/2021 Yes    BMI 35.0-35.9,adult [Z68.35] 06/11/2022 Not Applicable    Hypokalemia [E87.6] 06/17/2022 No    DVT prophylaxis [Z29.9] 12/27/2021 Not Applicable      Problems Resolved During this Admission:    Diagnosis Date Noted Date Resolved POA    Type 2 diabetes mellitus with hyperglycemia [E11.65] 06/11/2022 06/11/2022 Yes    Depression with anxiety [F41.8] 06/11/2022 06/11/2022 Unknown       Discharged Condition: fair    Disposition: Skilled Nursing Facility    Follow Up:   Follow-up Information     Jo Underwood MD Follow up.    Specialty: Internal Medicine  Contact information:  [address] [place] [address] [phone]                       Patient Instructions:      CBC auto differential   Standing Status: Future Standing Exp. Date: 07/20/22     Comprehensive metabolic panel   Standing Status: Future Standing Exp. Date: 07/20/22     Magnesium   Standing Status: Future Standing Exp. Date: 07/20/22     Diet diabetic     Activity as tolerated       Significant Diagnostic Studies: Labs: All labs within the past 24 hours have been reviewed    Pending Diagnostic Studies:     None         Medications:  Reconciled Home Medications:      Medication List      START taking these medications    amLODIPine 5 MG tablet  Commonly known as: NORVASC  Take 1 tablet (5 mg total) by mouth once daily.     busPIRone 10 MG tablet  Commonly known as:  BUSPAR  Take 1 tablet (10 mg total) by mouth 3 (three) times daily as needed (anxiety).     insulin aspart U-100 100 unit/mL (3 mL) Inpn pen  Commonly known as: NovoLOG  Inject 1-10 Units into the skin before meals and at bedtime as needed (Hyperglycemia).     insulin detemir U-100 100 unit/mL (3 mL) Inpn pen  Commonly known as: Levemir FLEXTOUCH  Inject 10 Units into the skin once daily.     phenyleph-min oil-petrolatum 0.25-14-74.9 % Oint  Place 1 applicator rectally 4 (four) times daily as needed (hemorrhoids, itching).     potassium chloride SA 20 MEQ tablet  Commonly known as: K-DUR,KLOR-CON  Take 2 tablets (40 mEq total) by mouth once daily. for 3 days        CONTINUE taking these medications    acetaminophen 325 MG tablet  Commonly known as: TYLENOL  Take 650 mg by mouth 2 (two) times daily.     albuterol 2.5 mg /3 mL (0.083 %) nebulizer solution  Commonly known as: PROVENTIL  Take by nebulization.     albuterol-ipratropium 2.5 mg-0.5 mg/3 mL nebulizer solution  Commonly known as: DUO-NEB  Take 3 mLs by nebulization every 6 (six) hours as needed for Wheezing. Rescue     bisacodyL 5 mg EC tablet  Commonly known as: DULCOLAX  Take 5 mg by mouth daily as needed for Constipation.     cyanocobalamin 1,000 mcg/mL injection  1,000 mcg.     donepeziL 10 MG tablet  Commonly known as: ARICEPT  Take 10 mg by mouth every evening.     fluticasone propionate 50 mcg/actuation nasal spray  Commonly known as: FLONASE  1 spray by Each Nostril route once daily.     gabapentin 100 MG capsule  Commonly known as: NEURONTIN  Take 100 mg by mouth every evening.     levothyroxine 125 MCG tablet  Commonly known as: SYNTHROID  Take 125 mcg by mouth before breakfast.     nitrofurantoin 50 MG capsule  Commonly known as: MACRODANTIN  Take 50 mg by mouth Daily.     pantoprazole 40 MG tablet  Commonly known as: PROTONIX  Take 40 mg by mouth once daily.     simvastatin 20 MG tablet  Commonly known as: ZOCOR  Take 20 mg by mouth every  evening.     venlafaxine 75 MG 24 hr capsule  Commonly known as: EFFEXOR-XR  Take 75 mg by mouth once daily.        STOP taking these medications    aspirin 81 MG EC tablet  Commonly known as: ECOTRIN     baclofen 10 MG tablet  Commonly known as: LIORESAL     fentaNYL 50 mcg/hr  Commonly known as: DURAGESIC     furosemide 40 MG tablet  Commonly known as: LASIX     guaiFENesin 100 mg/5 ml 100 mg/5 mL syrup  Commonly known as: ROBITUSSIN     ibuprofen 400 MG tablet  Commonly known as: ADVIL,MOTRIN     insulin lispro 100 unit/mL injection     LANTUS SUBQ     loperamide 2 mg Tab  Commonly known as: IMODIUM A-D     lubiprostone 8 MCG Cap  Commonly known as: AMITIZA     mirtazapine 7.5 MG Tab  Commonly known as: REMERON     oxyCODONE-acetaminophen 5-325 mg per tablet  Commonly known as: PERCOCET     pramipexole 1 MG tablet  Commonly known as: MIRAPEX     sertraline 50 MG tablet  Commonly known as: ZOLOFT            Indwelling Lines/Drains at time of discharge:   Lines/Drains/Airways     None                 Time spent on the discharge of patient: >30 minutes         Mary Beth Good NP  Department of Hospital Medicine  [facility]

## 2022-06-20 NOTE — PROGRESS NOTES
Pharmacist Intervention IV to PO Note    Laisha Acosta is a 86 y.o. female being treated with IV medication pantoprazole    Patient Data:    Vital Signs (Most Recent):  Temp: 97.6 °F (36.4 °C) (06/20/22 0714)  Pulse: 84 (06/20/22 0714)  Resp: 20 (06/20/22 0714)  BP: (!) 130/58 (06/20/22 0714)  SpO2: 100 % (06/20/22 0714)   Vital Signs (72h Range):  Temp:  [97.3 °F (36.3 °C)-98.8 °F (37.1 °C)]   Pulse:  []   Resp:  [17-24]   BP: ()/(51-85)   SpO2:  [95 %-100 %]      CBC:  Recent Labs   Lab 06/18/22  0543 06/19/22  0631 06/20/22  0559   WBC 10.85 12.83* 7.59   RBC 3.19* 3.08* 2.91*   HGB 9.3* 9.1* 8.5*   HCT 29.4* 28.4* 27.5*    240 239   MCV 92 92 95   MCH 29.2 29.5 29.2   MCHC 31.6* 32.0 30.9*     CMP:     Recent Labs   Lab 06/18/22  0543 06/19/22  0631 06/20/22  0559   * 216* 219*   CALCIUM 7.9* 8.1* 7.9*   ALBUMIN 2.2* 2.1* 2.0*   PROT 5.2* 5.1* 5.0*   * 147* 146*   K 3.2* 3.5 4.1   CO2 24 23 24   * 119* 119*   BUN 6* 7* 9   CREATININE 0.6 0.6 0.8   ALKPHOS 97 97 92   ALT 10 11 10   AST 6* 7* 4*   BILITOT 0.5 0.4 0.2       Dietary Orders:  Diet Orders  Report           Diet diabetic [facility]; 1800 Calorie: Diabetic starting at 06/18 0846            Based on the following criteria, this patient qualifies for intravenous to oral conversion:  [x] The patients gastrointestinal tract is functioning (tolerating medications via oral or enteral route for 24 hours and tolerating food or enteral feeds for 24 hours).  [x] The patient is hemodynamically stable for 24 hours (heart rate <100 beats per minute, systolic blood pressure >99 mm Hg, and respiratory rate <20 breaths per minute).  [x] The patient shows clinical improvement (afebrile for at least 24 hours and white blood cell count downtrending or normalized). Additionally, the patient must be non-neutropenic (absolute neutrophil count >500 cells/mm3).  [x] For antimicrobials, the patient has received IV therapy for at least 24  hours.    IV medication pantoprazole will be changed to oral medication pantoprazole    Pharmacist's Name: Francy Alanis  Pharmacist's Extension: [phone]

## 2022-06-20 NOTE — PLAN OF CARE
Plan of care reviewed with patient. Patient requires additional teaching. No evidence of learning. Denies pain or discomfort at this time.

## 2022-06-20 NOTE — NURSING
KACI Sparrow from [facility] called. There van is in [place] she will have [facility] transport.

## 2022-06-20 NOTE — ASSESSMENT & PLAN NOTE
Urine culture positive for E. Coli, sensitive to current abx  6/20: Course of abx therapy complete. Discharge with prophylactic treatment.

## 2022-06-20 NOTE — PLAN OF CARE
[facility]  Discharge Final Note    Primary Care Provider: Jo Underwood MD    Expected Discharge Date: 6/20/2022    Final Discharge Note (most recent)     Final Note - 06/20/22 1257        Final Note    Assessment Type Final Discharge Note     Anticipated Discharge Disposition Skilled Nursing Facility        Post-Acute Status    Discharge Delays Ambulance Transport/Facility Transport                 Important Message from Medicare  Important Message from Medicare regarding Discharge Appeal Rights: Explained to patient/caregiver, Other (comments)     Date IMM was signed: 06/17/22  Time IMM was signed: 1321     Follow-up providers     Jo Underwood MD   Specialty: Internal Medicine   Relationship: PCP - General    [address] [place] [address]   Phone: [phone]       Next Steps: Follow up          After-discharge care            Destination     [facility]   Service: Skilled Nursing    [address] [facility] [address]   Phone: [phone]                     Final note is completed. The patient will be returning back to [facility]. At this time, she does not require any assistance from case management.

## 2022-07-26 NOTE — ED NOTES
Contacted [facility] and nurse reported that they are going to try to call the van and then they will give us a call back.

## 2022-07-26 NOTE — CARE UPDATE
07/26/22 1348   PRE-TX-O2   O2 Device (Oxygen Therapy) nasal cannula w/ humidification   $ Is the patient on Low Flow Oxygen? Yes   Flow (L/min) 2   Oxygen Concentration (%) 28   SpO2 96 %   Pulse Oximetry Type Intermittent   $ Pulse Oximetry - Multiple Charge Pulse Oximetry - Multiple   Probe Placed On (Pulse Ox) Left:;finger   Pulse 106   Resp (!) 22   Pt stated SOB after moving to new bed in 438.

## 2022-07-26 NOTE — ED PROVIDER NOTES
Encounter Date: 7/26/2022       History     Chief Complaint   Patient presents with    Shortness of Breath     Patient brought in by EMS from [facility], complaining of SOB that started yesterday morning. Patient is currently being treated for an infection and is on antibiotics. Pt oxygen saturation was 95% on 3L.      85 yo female with history of DM, dementia, HTN recent UTI on antibiotics here via EMS with dyspnea x 2 days. On oxygen 2L NS patient was low 90's. Dyspnea improved with oxygen. Worse with exertion. No CP. No swelling. No fever. No cough. Gradual onset.         Review of patient's allergies indicates:   Allergen Reactions    Codeine      Past Medical History:   Diagnosis Date    Encephalopathy 12/24/2021     No past surgical history on file.  No family history on file.     Review of Systems   Constitutional: Negative.    Respiratory: Positive for shortness of breath.    Cardiovascular: Negative.    Gastrointestinal: Negative.    All other systems reviewed and are negative.      Physical Exam     Initial Vitals [07/26/22 0513]   BP Pulse Resp Temp SpO2   127/66 (!) 122 (!) 30 98.6 °F (37 °C) 97 %      MAP       --         Physical Exam    Nursing note and vitals reviewed.  Constitutional: She appears well-developed and well-nourished. She is not diaphoretic. No distress.   HENT:   Head: Normocephalic and atraumatic.   Eyes: EOM are normal. Pupils are equal, round, and reactive to light. No scleral icterus.   Neck: Neck supple. No JVD present.   Normal range of motion.  Cardiovascular: Regular rhythm and intact distal pulses.   tachycardic   Pulmonary/Chest: No stridor. No respiratory distress. She has no wheezes. She has rhonchi. She has rales.   Abdominal: Abdomen is soft. Bowel sounds are normal. She exhibits no distension. There is no abdominal tenderness. There is no rebound.   Musculoskeletal:         General: No tenderness or edema. Normal range of motion.      Cervical back: Normal range  of motion and neck supple.     Neurological: She is alert. GCS score is 15. GCS eye subscore is 4. GCS verbal subscore is 5. GCS motor subscore is 6.   Skin: Skin is warm and dry. Capillary refill takes less than 2 seconds. No rash noted.         ED Course   Procedures  Labs Reviewed   CBC W/ AUTO DIFFERENTIAL - Abnormal; Notable for the following components:       Result Value    RBC 3.72 (*)     Hemoglobin 10.6 (*)     Hematocrit 35.0 (*)     MCHC 30.3 (*)     RDW 15.4 (*)     Immature Granulocytes 0.6 (*)     Gran # (ANC) 9.4 (*)     Immature Grans (Abs) 0.07 (*)     Lymph # 0.9 (*)     Gran % 83.2 (*)     Lymph % 7.9 (*)     All other components within normal limits   COMPREHENSIVE METABOLIC PANEL - Abnormal; Notable for the following components:    Glucose 228 (*)     Total Protein 5.9 (*)     Albumin 2.6 (*)     Anion Gap 7 (*)     All other components within normal limits   TROPONIN I HIGH SENSITIVITY - Abnormal; Notable for the following components:    Troponin I High Sensitivity 71.0 (*)     All other components within normal limits   NT-PRO NATRIURETIC PEPTIDE - Abnormal; Notable for the following components:    NT-proBNP 8450 (*)     All other components within normal limits   URINALYSIS, REFLEX TO URINE CULTURE - Abnormal; Notable for the following components:    Appearance, UA Cloudy (*)     Protein, UA 1+ (*)     Ketones, UA 1+ (*)     Occult Blood UA 1+ (*)     Leukocytes, UA 3+ (*)     All other components within normal limits    Narrative:     Preferred Collection Type->Urine, Clean Catch  Specimen Source->Urine   URINALYSIS MICROSCOPIC - Abnormal; Notable for the following components:    WBC, UA >100 (*)     Bacteria Many (*)     Hyaline Casts, UA 2.3 (*)     All other components within normal limits    Narrative:     Preferred Collection Type->Urine, Clean Catch  Specimen Source->Urine   CULTURE, URINE   LACTIC ACID, PLASMA   SARS-COV-2 RDRP GENE     EKG Readings: (Independently Interpreted)    Initial Reading: No STEMI. Rhythm: Sinus Tachycardia. Ectopy: No Ectopy. Clinical Impression: Sinus Tachycardia     ECG Results          EKG 12-lead (In process)  Result time 07/26/22 07:18:29    In process by Interface, Lab In [facility] (07/26/22 07:18:29)                 Narrative:    Test Reason : R06.02,    Vent. Rate : 122 BPM     Atrial Rate : 122 BPM     P-R Int : 146 ms          QRS Dur : 078 ms      QT Int : 304 ms       P-R-T Axes : 051 027 050 degrees     QTc Int : 433 ms    Sinus tachycardia  Nonspecific T wave abnormality  Abnormal ECG  When compared with ECG of 12-JUN-2022 09:54,  Premature supraventricular complexes are no longer Present    Referred By: [email or web address]   SELF           Confirmed By:                             Imaging Results          X-Ray Chest AP Portable (Final result)  Result time 07/26/22 09:01:25    Final result by Hayley Tony MD (07/26/22 09:01:25)                 Impression:      Cardiomegaly and diffuse infiltrate or edema with bibasilar atelectasis and small effusions      Electronically signed by: Hayley Tony MD  Date:    07/26/2022  Time:    09:01             Narrative:    EXAMINATION:  XR CHEST AP PORTABLE    CLINICAL HISTORY:  CHF;    TECHNIQUE:  portable chest x-ray    COMPARISON:  06/15/2022.    FINDINGS:  Cardiomegaly and ectatic aorta.  There is some new diffuse infiltrate or edema with bibasilar atelectasis and small effusions                                 Medications   sodium chloride 0.9% flush 10 mL (has no administration in time range)   cefTRIAXone (ROCEPHIN) 1 g/50 mL D5W IVPB (has no administration in time range)   ondansetron injection 4 mg (has no administration in time range)   furosemide injection 40 mg (has no administration in time range)   furosemide injection 40 mg (40 mg Intravenous Given 7/26/22 1536)   cefTRIAXone (ROCEPHIN) 1 g/50 mL D5W IVPB (0 g Intravenous Stopped 7/26/22 0819)     Medical Decision Making:   ED Management:  While waiting for  transfer.  Patient symptoms worsen.  Became progressively more short of breath.  Will admit patient for diuresis and Cardiology evaluation for new CHF.  Continue antibiotics for UTI             ED Course as of 07/26/22 1649   Tue Jul 26, 2022   0730 Labs and imaging noted.  Patient continues to appear well.  Minimally elevated troponin with elevated BNP.  Possible CHF.  Will start patient on Lasix.  Will change antibiotics from 2nd generation to 3rd generation cephalosporin per urine culture.  Will discharge back to nursing home.  Patient saturating 95% on 2 L which is baseline. [HD]      ED Course User Index  [HD] Cullen Tariq MD             Clinical Impression:   Final diagnoses:  [R06.02] Shortness of breath  [I50.9] Congestive heart failure, unspecified HF chronicity, unspecified heart failure type (Primary)  [N30.01] Acute cystitis with hematuria          ED Disposition Condition    Admit               Cullen Tariq MD  07/26/22 0820       Cullen Tariq MD  07/26/22 1647

## 2022-07-26 NOTE — ED NOTES
Gave bedside report to [facility], no questions while giving report. Patient moved to room 638.

## 2022-07-26 NOTE — ED NOTES
Patient is undressed into a gown, assessed patient's skin noted a healing Stage 1 ulcer in the sacrum. Patient had barrier cream that was applied from nursing home.

## 2022-07-26 NOTE — CONSULTS
Cardiology Consultation Note  ([facility])    Today's Date:  7/26/2022  Patient Name: Laisha Acosta    MRN: [id number]    Code Status: Full Code     Attending Physician: Buster Nuñez III, MD   Consulting Physician: JOSÉ MIGUEL Donnelly MD  ______________________________________________________________________    Reason for Consult:   Congestive heart failure    Temp: 97.9 °F (36.6 °C) (07/26/22 1557)  Pulse: 108 (07/26/22 1557)  Resp: 20 (07/26/22 1557)  BP: 129/70 (07/26/22 1557)  SpO2: 97 % (07/26/22 1557)     Subjective:   The patient is an 86-year-old lady whose past medical history is remarkable for:  · Hypothyroidism  · Hypertension  · Hyperlipidemia  · Parkinson's disease  · Diabetes mellitus (type 2)  · Anemia  · History of UTIs (Klebsiella)  · Dementia  · Major depression  · Status post right knee replacement     Presented by EMS from [facility] with complaints of shortness of breath that began yesterday.  The patient is on antibiotic therapy for possible UTI.  Oxygen saturation on 3 L nasal cannula: 95%.    BNP: 8450  Troponin I: 71    Urine analysis: 1+ occult blood 1+ protein 3+ leukocytes  (urine culture pending)    EKG:    Vent. Rate : 122 BPM     Atrial Rate : 122 BPM      P-R Int : 146 ms          QRS Dur : 078 ms       QT Int : 304 ms       P-R-T Axes : 051 027 050 degrees      QTc Int : 433 ms     Sinus tachycardia   Nonspecific T wave abnormality   Abnormal ECG   When compared with ECG of 12-JUN-2022 09:54,   Premature supraventricular complexes are no longer Present     Chest x-ray:  06/15/2022.     FINDINGS:  Cardiomegaly and ectatic aorta.  There is some new diffuse infiltrate or edema with bibasilar atelectasis and small effusions     Impression:     Cardiomegaly and diffuse infiltrate or edema with bibasilar atelectasis and small effusions        Electronically signed by: Hayley Tony MD  Date:                                            07/26/2022  Time:                                            09:01    Past Medical History:  Past Medical History:   Diagnosis Date    Encephalopathy 12/24/2021       No past surgical history on file.    No family history on file.    Social History     Socioeconomic History    Marital status:        Medications:  Current Facility-Administered Medications   Medication Dose Route Frequency Provider Last Rate Last Admin    [START ON 7/27/2022] cefTRIAXone (ROCEPHIN) 1 g/50 mL D5W IVPB  1 g Intravenous Q24H Cullen Tariq MD        ondansetron injection 4 mg  4 mg Intravenous Q8H PRN Cullen Tariq MD        sodium chloride 0.9% flush 10 mL  10 mL Intravenous PRN Cullen Tariq MD           Allergies:  Review of patient's allergies indicates:   Allergen Reactions    Codeine         Labs:   CMP   Recent Labs   Lab 07/26/22  0559      K 5.0      CO2 27   *   BUN 22   CREATININE 0.8   CALCIUM 8.9   PROT 5.9*   ALBUMIN 2.6*   BILITOT 0.4   ALKPHOS 88   AST 10   ALT 13   ANIONGAP 7*   ESTGFRAFRICA >60.0   EGFRNONAA >60.0   , CBC   Recent Labs   Lab 07/26/22  0559   WBC 11.30   HGB 10.6*   HCT 35.0*          Vital Signs (Most Recent):  Temp: 97.9 °F (36.6 °C) (07/26/22 1557)  Pulse: 108 (07/26/22 1557)  Resp: 20 (07/26/22 1557)  BP: 129/70 (07/26/22 1557)  SpO2: 97 % (07/26/22 1557) Vital Signs (24h Range):  Temp:  [97.9 °F (36.6 °C)-98.7 °F (37.1 °C)] 97.9 °F (36.6 °C)  Pulse:  [105-122] 108  Resp:  [16-30] 20  SpO2:  [90 %-100 %] 97 %  BP: (111-133)/(64-70) 129/70     Weight: 97.1 kg (214 lb 1.6 oz)  Body mass index is 37.93 kg/m².    SpO2: 97 %  O2 Device (Oxygen Therapy): nasal cannula w/ humidification      Intake/Output Summary (Last 24 hours) at 7/26/2022 1638  Last data filed at 7/26/2022 0819  Gross per 24 hour   Intake 50 ml   Output --   Net 50 ml       Review of Systems   Cardiovascular: Positive for irregular heartbeat.   Respiratory: Positive for shortness of breath and wheezing.        Physical Exam  General:  " Mildly dyspneic; in no distress; limited communication.  Heent:  No JVD; oral mucosa moist.  Lungs:  Decreased breath sounds secondary to poor inspiratory effort.  No overt wheezing appreciated.  Heart:  Regular rhythm; grade 3 systolic ejection murmur.  Extremities:  Warm; trace bilateral pretibial edema.    Assessment and Plan:    1.  Congestive heart failure:   Elevated BNP, with chest x-ray showing bilateral effusions and "cephalization".  Also with clinical evidence of CHF.  -- Lasix 40 mg IV every 12 hours  -- Continue with oxygen therapy.  -- Add carvedilol 3.125 mg twice daily  -- Nitropatch 0.4 mg/h  -- Jardiance when available.     2.  Elevated troponin I: consistent with a non-ST segment elevated MI.  -- Aspirin 81 mg daily  -- Plavix 75 mg 1 tablet daily (will need to watch closely for drop in hemoglobin given history of anemia)  -- Carvedilol  -- Nitrate therapy  -- Watch closely for dysrhythmias.    3.  Hypertension: well-controlled.  -- Reinstitute Norvasc 5 mg daily.    4.  Hyperlipidemia: restart statin therapy.  -- Simvastatin 20 mg daily.    5.  Diabetes mellitus: as per primary care team.    6.  Hypothyroidism: restart levothyroxine at 150 mcg/day.  -- TSH at the discretion of her primary care physician    7.  UTI: on antibiotic therapy.  -- Urine cultures pending.    · Patient is in a chronic comorbid state.      "

## 2022-07-27 PROBLEM — Z79.4 TYPE 2 DIABETES MELLITUS WITHOUT COMPLICATION, WITH LONG-TERM CURRENT USE OF INSULIN: Status: ACTIVE | Noted: 2022-01-01

## 2022-07-27 PROBLEM — I50.9 CONGESTIVE HEART FAILURE: Status: ACTIVE | Noted: 2022-01-01

## 2022-07-27 PROBLEM — E11.9 TYPE 2 DIABETES MELLITUS WITHOUT COMPLICATION, WITHOUT LONG-TERM CURRENT USE OF INSULIN: Status: ACTIVE | Noted: 2022-01-01

## 2022-07-27 PROBLEM — E78.49 OTHER HYPERLIPIDEMIA: Status: ACTIVE | Noted: 2022-01-01

## 2022-07-27 PROBLEM — I10 PRIMARY HYPERTENSION: Status: ACTIVE | Noted: 2022-01-01

## 2022-07-27 NOTE — CONSULTS
[facility] Surg  Podiatry  Consult Note    Patient Name: Laisha Acosta  MRN: [id number]  Admission Date: 7/26/2022  Hospital Length of Stay: 1 days  Attending Physician: Jo Underwood MD  Primary Care Provider: Jo Underwood MD     Inpatient consult to Podiatry  Consult performed by: Landon Bullock DPM  Consult ordered by: Mary Beth Good NP  Reason for consult: DM foot exam  Assessment/Recommendations: Toenails reduced without incident, heel protectors ordered        Subjective:     History of Present Illness:  Patient is an 86-year-old female admitted to OS  from [facility] with worsening shortness of breath, CHF exacerbation, UTI.  Patient seen resting at bedside this morning, complains of some right leg pain due to contracture.  She states she is no longer ambulatory, bed ridden, does have bootie she wears at the nursing home.  She is without any open wounds on her feet today.  She does have long, thick fungal toenails causing some irritation.  Patient with history of diabetes, denies any previous diabetic foot ulcerations or infections.  She denies any history of peripheral vascular disease.    Scheduled Meds:   amLODIPine  5 mg Oral Daily    aspirin  81 mg Oral Daily    carvediloL  3.125 mg Oral BID    cefTRIAXone (ROCEPHIN) IVPB  1 g Intravenous Q24H    enoxaparin  40 mg Subcutaneous Daily    furosemide (LASIX) injection  40 mg Intravenous Q12H    levothyroxine  125 mcg Oral Before breakfast    nitroGLYCERIN 0.4 MG/HR TD PT24  1 patch Transdermal Daily    pantoprazole  40 mg Oral Daily    pravastatin  20 mg Oral QHS     Continuous Infusions:  PRN Meds:acetaminophen, ondansetron, sodium chloride 0.9%    Review of patient's allergies indicates:   Allergen Reactions    Codeine         Past Medical History:   Diagnosis Date    Encephalopathy 12/24/2021     No past surgical history on file.    Family History    None       Tobacco Use    Smoking status: Not on  file    Smokeless tobacco: Not on file   Substance and Sexual Activity    Alcohol use: Not on file    Drug use: Not on file    Sexual activity: Not on file     Review of Systems   Constitutional: Negative for fever.   HENT: Positive for hearing loss.    Eyes: Negative for visual disturbance.   Respiratory: Positive for shortness of breath.    Cardiovascular: Positive for leg swelling.   Gastrointestinal: Negative for nausea and vomiting.   Musculoskeletal: Positive for arthralgias and gait problem.   Skin: Positive for wound (sacrum).   Neurological: Positive for weakness.   Hematological: Bruises/bleeds easily.     Objective:     Vital Signs (Most Recent):  Temp: 97.9 °F (36.6 °C) (07/27/22 0729)  Pulse: 85 (07/27/22 0729)  Resp: (!) 24 (07/27/22 0729)  BP: 137/77 (07/27/22 0943)  SpO2: 96 % (07/27/22 0729) Vital Signs (24h Range):  Temp:  [97.6 °F (36.4 °C)-98.7 °F (37.1 °C)] 97.9 °F (36.6 °C)  Pulse:  [] 85  Resp:  [18-24] 24  SpO2:  [93 %-98 %] 96 %  BP: (111-137)/(64-77) 137/77     Weight: 97.1 kg (214 lb 1.6 oz)  Body mass index is 37.93 kg/m².    Foot Exam    General  General Appearance: (elderly female, ill appearing)  Orientation: alert and oriented to person, place, and time       Right Foot/Ankle     Inspection and Palpation  Ecchymosis: none  Swelling: (+1 ankle)  Hammertoes: second toe, third toe, fourth toe and fifth toe  Skin Exam: skin intact; (fungal changes to toenails, hallux most affected; toenails elongated)    Neurovascular  Dorsalis pedis: 2+  Posterior tibial: 2+  Saphenous nerve sensation: diminished  Tibial nerve sensation: diminished  Superficial peroneal nerve sensation: diminished  Deep peroneal nerve sensation: diminished  Sural nerve sensation: diminished    Muscle Strength  Ankle dorsiflexion: 0  Ankle plantar flexion: 0  Ankle inversion: 0  Ankle eversion: 0  Great toe extension: 0  Great toe flexion: 0    Comments  Right LE flexion contracture at knee, lateral aspect of  foot resting on bed.  No muscle movement against gravity noted    Left Foot/Ankle      Inspection and Palpation  Ecchymosis: none  Swelling: (+1 ankle)  Hammertoes: second toe, third toe, fourth toe and fifth toe  Skin Exam: skin intact; (fungal changes to toenails, hallux most affected; toenails elongated)    Neurovascular  Dorsalis pedis: 2+  Posterior tibial: 2+  Saphenous nerve sensation: diminished  Tibial nerve sensation: diminished  Superficial peroneal nerve sensation: diminished  Deep peroneal nerve sensation: diminished  Sural nerve sensation: diminished    Muscle Strength  Ankle dorsiflexion: 1  Ankle plantar flexion: 1  Ankle inversion: 1  Ankle eversion: 1  Great toe extension: 1  Great toe flexion: 1    Comments  Weakness to left LE, no appreciable contracture here, patient able to raise leg, flex/extend ankle against gravity      Laboratory:    CBC:   Recent Labs   Lab 07/27/22  0533   WBC 4.94   RBC 3.25*   HGB 9.5*   HCT 30.7*      MCV 95   MCH 29.2   MCHC 30.9*     CMP:   Recent Labs   Lab 07/27/22  0533   *   CALCIUM 8.4*   ALBUMIN 2.3*   PROT 5.5*      K 4.8   CO2 32*      BUN 26*   CREATININE 0.9   ALKPHOS 78   ALT 13   AST 10   BILITOT 0.3     Coagulation: No results for input(s): PT, INR, APTT in the last 168 hours.  Microbiology Results (last 7 days)     Procedure Component Value Units Date/Time    Urine culture [[id number]] Collected: 07/26/22 0616    Order Status: No result Specimen: Urine Updated: 07/26/22 0640        Recent Labs   Lab 07/26/22 0616   COLORU Yellow   SPECGRAV 1.020   PHUR 5.0   PROTEINUA 1+*   BACTERIA Many*   NITRITE Negative   LEUKOCYTESUR 3+*   UROBILINOGEN 1.0   HYALINECASTS 2.3*       Diagnostic Results:  I have reviewed all pertinent imaging results/findings within the past 24 hours.      Assessment/Plan:     Onychomycosis  All toenails reduced without incident today, educated patient on importance of good foot care in setting of diabetes.       Diabetes with neuropathy   Heel protectors ordered to minimize the risk of pressure ulcerations, as patient is bedbound. Educated patient on the importance of good foot care in the setting of diabetes.  Patient can follow up as needed on an outpatient basis.    CHF, elevated troponin  Cardiology on consult    HTN, hyperlipidemia, UTI, hypothyroidism, DM  Management per primary team    Thank you for your consult. I will sign off. Please contact us if you have any additional questions.    Landon Bullock DPM  Podiatry  [facility] S[facility]

## 2022-07-27 NOTE — PLAN OF CARE
[facility] S[facility]  Initial Discharge Assessment       Primary Care Provider: Jo Underwood MD    Admission Diagnosis: Shortness of breath [R06.02]  Acute cystitis with hematuria [N30.01]  Congestive heart failure, unspecified HF chronicity, unspecified heart failure type [I50.9]    Admission Date: 7/26/2022  Expected Discharge Date:     Discharge Barriers Identified: None    Payor: MEDICARE / Plan: MEDICARE PART A & B / Product Type: Government /     Extended Emergency Contact Information  Primary Emergency Contact: SAUL MESA  Mobile Phone: [phone]  Relation: Daughter  Preferred language: English  Secondary Emergency Contact: DONAVAN FERGUSON  Mobile Phone: [phone]  Relation: Daughter  Preferred language: English    Discharge Plan A: Return to nursing home  Discharge Plan B: Return to Nursing Home      [facility] [place] [facility] [address]  [address]  [place] [address]  Phone: [phone] Fax: [phone]    [id number] - [place], [place] - [address] AT [facility] [address] [place] [address] & [facility]  [address] [facility] [address]  Phone: [phone] Fax: [phone]      Initial Assessment (most recent)     Adult Discharge Assessment - 07/26/22 1447        Discharge Assessment    Assessment Type Discharge Planning Assessment     Confirmed/corrected address, phone number and insurance Yes     Confirmed Demographics Correct on Facesheet     Source of Information patient;family     Reason For Admission Shortness of breath     Lives With facility resident     Facility Arrived From: [facility]     Do you expect to return to your current living situation? Yes     Do you have help at home or someone to help you manage your care at home? Yes     Who are your caregiver(s) and their phone number(s)? Nursing staff at [phone]     Current cognitive status: Alert/Oriented      Walking or Climbing Stairs Difficulty transferring difficulty, requires equipment;ambulation difficulty, dependent;transferring difficulty, dependent;stair climbing difficulty, dependent     Mobility Management wheel chair     Dressing/Bathing Difficulty bathing difficulty, assistance 1 person;dressing difficulty, assistance 1 person   The patient receives bed baths.    Home Accessibility wheelchair accessible     Home Layout Able to live on 1st floor     Equipment Currently Used at Home oxygen;wheelchair     Readmission within 30 days? No     Patient currently being followed by outpatient case management? No     Do you currently have service(s) that help you manage your care at home? No     Do you take prescription medications? Yes     Do you have prescription coverage? Yes     Do you have any problems affording any of your prescribed medications? No     Is the patient taking medications as prescribed? yes     Who is going to help you get home at discharge? --   TBD    How do you get to doctors appointments? other (see comments)   [facility]    Are you on dialysis? No     Do you take coumadin? No     Discharge Plan A Return to nursing home     Discharge Plan B Return to Nursing Home     DME Needed Upon Discharge  other (see comments)   TBD    Discharge Plan discussed with: Patient;Adult children     Discharge Barriers Identified None             Initial discharge assessment is completed. Spoke to the patient and her family. She is a resident from [facility]. She requires assistance with her ADLs. The plan is for the patient to return back to [facility] upon discharge. Contact information was left in the patient's room if she had any questions or concerns.  Case management/SW will remain available until the patient is discharged.

## 2022-07-27 NOTE — HPI
Pt is 87 y/o female, resident of [facility], with Hx of HTN, HLD, DM II, Hypothyroid, and Dementia, presented to [facility] ED yesterday with c/o dyspnea and SpO2 in low 90s one oxygen x1 days. She is currently being treated for a Urinary Tract Infection.

## 2022-07-27 NOTE — PROGRESS NOTES
Cardiology Progress Note  ([facility])    Today's Date:  7/27/2022  Patient Name: Laisha Acosta    MRN: [id number]    Code Status: Full Code     Attending Physician: Jo Underwood MD   Consulting Physician: JOSÉ MIGUEL Donnelly MD    Hospital Course: Please see previous note dated 7/26/2022.      ROS        Full note [id number] at 5.50 PM.

## 2022-07-27 NOTE — SUBJECTIVE & OBJECTIVE
Past Medical History:   Diagnosis Date    Encephalopathy 12/24/2021       No past surgical history on file.    Review of patient's allergies indicates:   Allergen Reactions    Codeine        No current facility-administered medications on file prior to encounter.     Current Outpatient Medications on File Prior to Encounter   Medication Sig    acetaminophen (TYLENOL) 325 MG tablet Take 650 mg by mouth 2 (two) times daily.    albuterol (PROVENTIL) 2.5 mg /3 mL (0.083 %) nebulizer solution Take by nebulization.    albuterol-ipratropium (DUO-NEB) 2.5 mg-0.5 mg/3 mL nebulizer solution Take 3 mLs by nebulization every 6 (six) hours as needed for Wheezing. Rescue    amLODIPine (NORVASC) 5 MG tablet Take 1 tablet (5 mg total) by mouth once daily.    bisacodyL (DULCOLAX) 5 mg EC tablet Take 5 mg by mouth daily as needed for Constipation.    busPIRone (BUSPAR) 10 MG tablet Take 1 tablet (10 mg total) by mouth 3 (three) times daily as needed (anxiety).    cyanocobalamin 1,000 mcg/mL injection 1,000 mcg.    donepeziL (ARICEPT) 10 MG tablet Take 10 mg by mouth every evening.    fluconazole (DIFLUCAN) 200 MG Tab Take 100 mg by mouth once daily.    fluticasone propionate (FLONASE) 50 mcg/actuation nasal spray 1 spray by Each Nostril route once daily.    gabapentin (NEURONTIN) 100 MG capsule Take 100 mg by mouth every evening.    insulin aspart U-100 (NOVOLOG) 100 unit/mL (3 mL) InPn pen Inject 1-10 Units into the skin before meals and at bedtime as needed (Hyperglycemia).    insulin detemir U-100 (LEVEMIR FLEXTOUCH) 100 unit/mL (3 mL) SubQ InPn pen Inject 10 Units into the skin once daily.    ipratropium (ATROVENT HFA) 17 mcg/actuation inhaler Inhale 2 puffs into the lungs every 6 (six) hours. Rescue    levothyroxine (SYNTHROID) 125 MCG tablet Take 150 mcg by mouth before breakfast.    nitrofurantoin (MACRODANTIN) 50 MG capsule Take 50 mg by mouth Daily.    pantoprazole (PROTONIX) 40 MG tablet Take 40 mg by mouth once daily.     phenyleph-min oil-petrolatum 0.25-14-74.9 % Oint Place 1 applicator rectally 4 (four) times daily as needed (hemorrhoids, itching).    simvastatin (ZOCOR) 20 MG tablet Take 20 mg by mouth every evening.    venlafaxine (EFFEXOR-XR) 75 MG 24 hr capsule Take 75 mg by mouth once daily.    [DISCONTINUED] aspirin (ECOTRIN) 81 MG EC tablet Take 81 mg by mouth once daily.    [DISCONTINUED] baclofen (LIORESAL) 10 MG tablet Take 10 mg by mouth 3 (three) times daily.    [DISCONTINUED] insulin lispro 100 unit/mL injection Inject into the skin 3 (three) times daily before meals.    [DISCONTINUED] mirtazapine (REMERON) 7.5 MG Tab Take 7.5 mg by mouth every evening.    [DISCONTINUED] pramipexole (MIRAPEX) 1 MG tablet Take 1 mg by mouth 3 (three) times daily.    [DISCONTINUED] sertraline (ZOLOFT) 50 MG tablet Take 50 mg by mouth once daily.     Family History    None       Tobacco Use    Smoking status: Not on file    Smokeless tobacco: Not on file   Substance and Sexual Activity    Alcohol use: Not on file    Drug use: Not on file    Sexual activity: Not on file     Review of Systems   Unable to perform ROS: Dementia   Objective:     Vital Signs (Most Recent):  Temp: 97.9 °F (36.6 °C) (07/27/22 0729)  Pulse: 85 (07/27/22 0729)  Resp: (!) 24 (07/27/22 0729)  BP: 137/77 (07/27/22 0943)  SpO2: 96 % (07/27/22 0729)   Vital Signs (24h Range):  Temp:  [97.6 °F (36.4 °C)-98.7 °F (37.1 °C)] 97.9 °F (36.6 °C)  Pulse:  [] 85  Resp:  [18-24] 24  SpO2:  [93 %-100 %] 96 %  BP: (111-137)/(64-77) 137/77     Weight: 97.1 kg (214 lb 1.6 oz)  Body mass index is 37.93 kg/m².    Physical Exam  Vitals and nursing note reviewed.   Constitutional:       General: She is not in acute distress.     Appearance: She is obese.   HENT:      Head: Normocephalic.      Nose: Nose normal.      Mouth/Throat:      Mouth: Mucous membranes are moist.      Pharynx: Oropharynx is clear.   Eyes:      General: No scleral icterus.     Extraocular Movements:  Extraocular movements intact.      Pupils: Pupils are equal, round, and reactive to light.   Cardiovascular:      Rate and Rhythm: Normal rate and regular rhythm.      Pulses: Normal pulses.      Heart sounds: Normal heart sounds.   Pulmonary:      Effort: Pulmonary effort is normal.      Breath sounds: Rales present.   Abdominal:      General: Bowel sounds are normal. There is no distension.      Palpations: Abdomen is soft.      Tenderness: There is no abdominal tenderness. There is no guarding.   Musculoskeletal:         General: Normal range of motion.      Cervical back: Normal range of motion. No rigidity.   Skin:     General: Skin is warm and dry.      Capillary Refill: Capillary refill takes less than 2 seconds.      Comments: Wound to sacrum/buttocks   Neurological:      General: No focal deficit present.      Mental Status: She is alert.   Psychiatric:         Mood and Affect: Mood normal.         Behavior: Behavior normal.         CRANIAL NERVES     CN III, IV, VI   Pupils are equal, round, and reactive to light.     Significant Labs: All pertinent labs within the past 24 hours have been reviewed.    CBC:   Recent Labs   Lab 07/26/22  0559 07/27/22  0533   WBC 11.30 4.94   HGB 10.6* 9.5*   HCT 35.0* 30.7*    173     CMP:   Recent Labs   Lab 07/26/22  0559 07/27/22  0533    143   K 5.0 4.8    108   CO2 27 32*   * 308*   BUN 22 26*   CREATININE 0.8 0.9   CALCIUM 8.9 8.4*   PROT 5.9* 5.5*   ALBUMIN 2.6* 2.3*   BILITOT 0.4 0.3   ALKPHOS 88 78   AST 10 10   ALT 13 13   ANIONGAP 7* 3*   EGFRNONAA >60.0 58.9*     Lactic Acid:   Recent Labs   Lab 07/26/22  0559   LACTATE 0.9     Urine Studies:   Recent Labs   Lab 07/26/22  0616   COLORU Yellow   APPEARANCEUA Cloudy*   PHUR 5.0   SPECGRAV 1.020   PROTEINUA 1+*   GLUCUA Negative   KETONESU 1+*   BILIRUBINUA Negative   OCCULTUA 1+*   NITRITE Negative   UROBILINOGEN 1.0   LEUKOCYTESUR 3+*   RBCUA 3   WBCUA >100*   BACTERIA Many*    SQUAMEPITHEL 0   HYALINECASTS 2.3*       Significant Imaging: I have reviewed all pertinent imaging results/findings within the past 24 hours.    X-Ray Chest AP Portable  Narrative: EXAMINATION:  XR CHEST AP PORTABLE    CLINICAL HISTORY:  CHF;    TECHNIQUE:  portable chest x-ray    COMPARISON:  06/15/2022.    FINDINGS:  Cardiomegaly and ectatic aorta.  There is some new diffuse infiltrate or edema with bibasilar atelectasis and small effusions  Impression: Cardiomegaly and diffuse infiltrate or edema with bibasilar atelectasis and small effusions    Electronically signed by: Hayley Tony MD  Date:    07/26/2022  Time:    09:01

## 2022-07-27 NOTE — PLAN OF CARE
Problem: Adult Inpatient Plan of Care  Goal: Patient-Specific Goal (Individualized)  Outcome: Ongoing, Not Progressing  Goal: Optimal Comfort and Wellbeing  Outcome: Ongoing, Not Progressing     Problem: Impaired Wound Healing  Goal: Optimal Wound Healing  Outcome: Ongoing, Not Progressing     Problem: Skin Injury Risk Increased  Goal: Skin Health and Integrity  Outcome: Ongoing, Not Progressing         Problem: Adult Inpatient Plan of Care  Goal: Plan of Care Review  Outcome: Ongoing, Progressing  Goal: Absence of Hospital-Acquired Illness or Injury  Outcome: Ongoing, Progressing  Goal: Readiness for Transition of Care  Outcome: Ongoing, Progressing

## 2022-07-27 NOTE — H&P
[facility] Medicine  History & Physical    Patient Name: Laisha Acosta  MRN: [id number]  Patient Class: IP- Inpatient  Admission Date: 7/26/2022  Attending Physician: Jo Underwood MD   Primary Care Provider: Jo Underwood MD         Patient information was obtained from patient, past medical records and ER records.     Subjective:     Principal Problem:Congestive heart failure    Chief Complaint:   Chief Complaint   Patient presents with    Shortness of Breath     Patient brought in by EMS from [facility], complaining of SOB that started yesterday morning. Patient is currently being treated for an infection and is on antibiotics. Pt oxygen saturation was 95% on 3L.         HPI: Pt is 85 y/o female, resident of [facility], with Hx of HTN, HLD, DM II, Hypothyroid, and Dementia, presented to [facility] ED yesterday with c/o dyspnea and SpO2 in low 90s one oxygen x1 days. She is currently being treated for a Urinary Tract Infection.        Past Medical History:   Diagnosis Date    Encephalopathy 12/24/2021       No past surgical history on file.    Review of patient's allergies indicates:   Allergen Reactions    Codeine        No current facility-administered medications on file prior to encounter.     Current Outpatient Medications on File Prior to Encounter   Medication Sig    acetaminophen (TYLENOL) 325 MG tablet Take 650 mg by mouth 2 (two) times daily.    albuterol (PROVENTIL) 2.5 mg /3 mL (0.083 %) nebulizer solution Take by nebulization.    albuterol-ipratropium (DUO-NEB) 2.5 mg-0.5 mg/3 mL nebulizer solution Take 3 mLs by nebulization every 6 (six) hours as needed for Wheezing. Rescue    amLODIPine (NORVASC) 5 MG tablet Take 1 tablet (5 mg total) by mouth once daily.    bisacodyL (DULCOLAX) 5 mg EC tablet Take 5 mg by mouth daily as needed for Constipation.    busPIRone (BUSPAR) 10 MG tablet Take 1 tablet (10 mg total) by mouth 3 (three) times daily as  needed (anxiety).    cyanocobalamin 1,000 mcg/mL injection 1,000 mcg.    donepeziL (ARICEPT) 10 MG tablet Take 10 mg by mouth every evening.    fluconazole (DIFLUCAN) 200 MG Tab Take 100 mg by mouth once daily.    fluticasone propionate (FLONASE) 50 mcg/actuation nasal spray 1 spray by Each Nostril route once daily.    gabapentin (NEURONTIN) 100 MG capsule Take 100 mg by mouth every evening.    insulin aspart U-100 (NOVOLOG) 100 unit/mL (3 mL) InPn pen Inject 1-10 Units into the skin before meals and at bedtime as needed (Hyperglycemia).    insulin detemir U-100 (LEVEMIR FLEXTOUCH) 100 unit/mL (3 mL) SubQ InPn pen Inject 10 Units into the skin once daily.    ipratropium (ATROVENT HFA) 17 mcg/actuation inhaler Inhale 2 puffs into the lungs every 6 (six) hours. Rescue    levothyroxine (SYNTHROID) 125 MCG tablet Take 150 mcg by mouth before breakfast.    nitrofurantoin (MACRODANTIN) 50 MG capsule Take 50 mg by mouth Daily.    pantoprazole (PROTONIX) 40 MG tablet Take 40 mg by mouth once daily.    phenyleph-min oil-petrolatum 0.25-14-74.9 % Oint Place 1 applicator rectally 4 (four) times daily as needed (hemorrhoids, itching).    simvastatin (ZOCOR) 20 MG tablet Take 20 mg by mouth every evening.    venlafaxine (EFFEXOR-XR) 75 MG 24 hr capsule Take 75 mg by mouth once daily.    [DISCONTINUED] aspirin (ECOTRIN) 81 MG EC tablet Take 81 mg by mouth once daily.    [DISCONTINUED] baclofen (LIORESAL) 10 MG tablet Take 10 mg by mouth 3 (three) times daily.    [DISCONTINUED] insulin lispro 100 unit/mL injection Inject into the skin 3 (three) times daily before meals.    [DISCONTINUED] mirtazapine (REMERON) 7.5 MG Tab Take 7.5 mg by mouth every evening.    [DISCONTINUED] pramipexole (MIRAPEX) 1 MG tablet Take 1 mg by mouth 3 (three) times daily.    [DISCONTINUED] sertraline (ZOLOFT) 50 MG tablet Take 50 mg by mouth once daily.     Family History    None       Tobacco Use    Smoking status: Not on file     Smokeless tobacco: Not on file   Substance and Sexual Activity    Alcohol use: Not on file    Drug use: Not on file    Sexual activity: Not on file     Review of Systems   Unable to perform ROS: Dementia   Objective:     Vital Signs (Most Recent):  Temp: 97.9 °F (36.6 °C) (07/27/22 0729)  Pulse: 85 (07/27/22 0729)  Resp: (!) 24 (07/27/22 0729)  BP: 137/77 (07/27/22 0943)  SpO2: 96 % (07/27/22 0729)   Vital Signs (24h Range):  Temp:  [97.6 °F (36.4 °C)-98.7 °F (37.1 °C)] 97.9 °F (36.6 °C)  Pulse:  [] 85  Resp:  [18-24] 24  SpO2:  [93 %-100 %] 96 %  BP: (111-137)/(64-77) 137/77     Weight: 97.1 kg (214 lb 1.6 oz)  Body mass index is 37.93 kg/m².    Physical Exam  Vitals and nursing note reviewed.   Constitutional:       General: She is not in acute distress.     Appearance: She is obese.   HENT:      Head: Normocephalic.      Nose: Nose normal.      Mouth/Throat:      Mouth: Mucous membranes are moist.      Pharynx: Oropharynx is clear.   Eyes:      General: No scleral icterus.     Extraocular Movements: Extraocular movements intact.      Pupils: Pupils are equal, round, and reactive to light.   Cardiovascular:      Rate and Rhythm: Normal rate and regular rhythm.      Pulses: Normal pulses.      Heart sounds: Normal heart sounds.   Pulmonary:      Effort: Pulmonary effort is normal.      Breath sounds: Rales present.   Abdominal:      General: Bowel sounds are normal. There is no distension.      Palpations: Abdomen is soft.      Tenderness: There is no abdominal tenderness. There is no guarding.   Musculoskeletal:         General: Normal range of motion.      Cervical back: Normal range of motion. No rigidity.   Skin:     General: Skin is warm and dry.      Capillary Refill: Capillary refill takes less than 2 seconds.      Comments: Wound to sacrum/buttocks   Neurological:      General: No focal deficit present.      Mental Status: She is alert.   Psychiatric:         Mood and Affect: Mood normal.          Behavior: Behavior normal.         CRANIAL NERVES     CN III, IV, VI   Pupils are equal, round, and reactive to light.     Significant Labs: All pertinent labs within the past 24 hours have been reviewed.    CBC:   Recent Labs   Lab 07/26/22  0559 07/27/22  0533   WBC 11.30 4.94   HGB 10.6* 9.5*   HCT 35.0* 30.7*    173     CMP:   Recent Labs   Lab 07/26/22  0559 07/27/22  0533    143   K 5.0 4.8    108   CO2 27 32*   * 308*   BUN 22 26*   CREATININE 0.8 0.9   CALCIUM 8.9 8.4*   PROT 5.9* 5.5*   ALBUMIN 2.6* 2.3*   BILITOT 0.4 0.3   ALKPHOS 88 78   AST 10 10   ALT 13 13   ANIONGAP 7* 3*   EGFRNONAA >60.0 58.9*     Lactic Acid:   Recent Labs   Lab 07/26/22  0559   LACTATE 0.9     Urine Studies:   Recent Labs   Lab 07/26/22  0616   COLORU Yellow   APPEARANCEUA Cloudy*   PHUR 5.0   SPECGRAV 1.020   PROTEINUA 1+*   GLUCUA Negative   KETONESU 1+*   BILIRUBINUA Negative   OCCULTUA 1+*   NITRITE Negative   UROBILINOGEN 1.0   LEUKOCYTESUR 3+*   RBCUA 3   WBCUA >100*   BACTERIA Many*   SQUAMEPITHEL 0   HYALINECASTS 2.3*       Significant Imaging: I have reviewed all pertinent imaging results/findings within the past 24 hours.    X-Ray Chest AP Portable  Narrative: EXAMINATION:  XR CHEST AP PORTABLE    CLINICAL HISTORY:  CHF;    TECHNIQUE:  portable chest x-ray    COMPARISON:  06/15/2022.    FINDINGS:  Cardiomegaly and ectatic aorta.  There is some new diffuse infiltrate or edema with bibasilar atelectasis and small effusions  Impression: Cardiomegaly and diffuse infiltrate or edema with bibasilar atelectasis and small effusions    Electronically signed by: Hayley Tony MD  Date:    07/26/2022  Time:    09:01    Assessment/Plan:     * Congestive heart failure  Defer to Cardiology      Primary hypertension  Blood pressure stable. Monitor and adjust meds PRN.      Other hyperlipidemia  Continue statin therapy      Type 2 diabetes mellitus without complication, with long-term current use of  insulin  Patient's FSGs are controlled on current medication regimen.  Last A1c reviewed- No results found for: LABA1C, HGBA1C  Most recent fingerstick glucose reviewed- No results for input(s): POCTGLUCOSE in the last 24 hours.  Current correctional scale  Medium  Maintain anti-hyperglycemic dose as follows-   Antihyperglycemics (From admission, onward)            None        Hold Oral hypoglycemics while patient is in the hospital.    Other specified hypothyroidism  Continue home dose Synthroid      Acute cystitis with hematuria  IV abx with Rocephin per C&S 6/12/22      DVT prophylaxis  Lovenox        VTE Risk Mitigation (From admission, onward)         Ordered     enoxaparin injection 40 mg  Daily         07/26/22 1654     IP VTE HIGH RISK PATIENT  Once         07/26/22 1527     Place sequential compression device  Until discontinued         07/26/22 1527                   Mary Beth Good NP  Department of Hospital Medicine   [facility]

## 2022-07-27 NOTE — PROGRESS NOTES
"Cardiology Progress Note  ([facility])    Today's Date:  7/27/2022  Patient Name: Laisha Acosta    MRN: [id number]    Code Status: Full Code     Attending Physician: Jo Underwood MD   Consulting Physician: JOSÉ MIGUEL Donnelly MD    Hospital Course:     Clinical:      Hemodynamic:      Telemetry:      Relevant testing:      ROS    Vital Signs (Most Recent):  Temp: 97.9 °F (36.6 °C) (07/27/22 0729)  Pulse: 85 (07/27/22 0729)  Resp: (!) 24 (07/27/22 0729)  BP: 137/77 (07/27/22 0943)  SpO2: 96 % (07/27/22 0729) Vital Signs (24h Range):  Temp:  [97.6 °F (36.4 °C)-98.7 °F (37.1 °C)] 97.9 °F (36.6 °C)  Pulse:  [] 85  Resp:  [18-24] 24  SpO2:  [93 %-100 %] 96 %  BP: (111-137)/(64-77) 137/77     Weight: 97.1 kg (214 lb 1.6 oz)  Body mass index is 37.93 kg/m².    SpO2: 96 %  O2 Device (Oxygen Therapy): nasal cannula      Intake/Output Summary (Last 24 hours) at 7/27/2022 1031  Last data filed at 7/27/2022 0500  Gross per 24 hour   Intake 600 ml   Output 300 ml   Net 300 ml     Labs:  CMP   Recent Labs   Lab 07/26/22  0559 07/27/22  0533    143   K 5.0 4.8    108   CO2 27 32*   * 308*   BUN 22 26*   CREATININE 0.8 0.9   CALCIUM 8.9 8.4*   PROT 5.9* 5.5*   ALBUMIN 2.6* 2.3*   BILITOT 0.4 0.3   ALKPHOS 88 78   AST 10 10   ALT 13 13   ANIONGAP 7* 3*   ESTGFRAFRICA >60.0 >60.0   EGFRNONAA >60.0 58.9*    and CBC   Recent Labs   Lab 07/26/22  0559 07/27/22  0533   WBC 11.30 4.94   HGB 10.6* 9.5*   HCT 35.0* 30.7*    173       Physical Examination:  General: Awake.  Complains of shortness of breath.  Although patient not overtly dyspneic.  Heent:   No JVD.  Lungs:   Decreased breath sounds; poor inspiratory effort.  Heart:   Regular rhythm; grade 2-3 systolic ejection murmur.  Extremities:   Trace edema.  Assessment and Plan:    1.  Congestive heart failure:   Elevated BNP, with chest x-ray showing bilateral effusions and "cephalization".  Also with clinical evidence of CHF.  -- Lasix 40 mg " IV every 12 hours  -- Continue with oxygen therapy.  -- Add carvedilol 3.125 mg twice daily  -- Nitropatch 0.4 mg/h  -- Jardiance when available.      2.  Elevated troponin I: consistent with a non-ST segment elevated MI.  -- Aspirin 81 mg daily  -- Hold Plavix 75 mg   -- Carvedilol  -- Nitrate therapy  -- Watch closely for dysrhythmias.  -- Continue conservative management.     3.  Hypertension: well-controlled.  -- Reinstitute Norvasc 5 mg daily.     4.  Hyperlipidemia: restart statin therapy.  -- Simvastatin 20 mg daily.     5.  Diabetes mellitus: as per primary care team.     6.  Hypothyroidism: restart levothyroxine at 150 mcg/day.  -- TSH at the discretion of her primary care physician     7.  UTI: on antibiotic therapy.  -- Urine cultures pending.       8.  Valvular disease: by auscultation, there is a grade 3 murmur.  Patient is in a chronic comorbid state,  and therefore not a candidate for any type of valvular intervention.

## 2022-07-28 NOTE — ASSESSMENT & PLAN NOTE
Patient's FSGs are controlled on current medication regimen.  Last A1c reviewed-   Lab Results   Component Value Date    HGBA1C 7.0 (H) 07/27/2022     Most recent fingerstick glucose reviewed-   Recent Labs   Lab 07/27/22  1937   POCTGLUCOSE 434*     Current correctional scale  Medium  Maintain anti-hyperglycemic dose as follows-   Antihyperglycemics (From admission, onward)            Start     Stop Route Frequency Ordered    07/27/22 1420  insulin aspart U-100 pen 1-10 Units         -- SubQ Before meals & nightly PRN 07/27/22 1331        Hold Oral hypoglycemics while patient is in the hospital.

## 2022-07-28 NOTE — PT/OT/SLP EVAL
Physical Therapy Evaluation    Patient Name:  Laisha Acosta   MRN:  [id number]    Recommendations:     Discharge Recommendations:  nursing facility, basic   Discharge Equipment Recommendations: none   Barriers to discharge: weakness, contractures,and decreased fxn'l mobility    Assessment:     Laisha Acosta is a 86 y.o. female admitted with a medical diagnosis of Congestive heart failure.  She presents with the following impairments/functional limitations:  weakness, impaired functional mobility .    Rehab Prognosis: Poor; patient would benefit from acute skilled PT services to address these deficits and reach maximum level of function.    Recent Surgery: * No surgery found *      Plan:     During this hospitalization, patient to be seen 6 x/week to address the identified rehab impairments via therapeutic activities, therapeutic exercises and progress toward the following goals:    · Plan of Care Expires:       Subjective     Chief Complaint: r leg pain  Patient/Family Comments/goals:   Pain/Comfort:  · Pain Rating 1: 8/10  · Location - Side 1: Right  · Location 1: leg    Patients cultural, spiritual, Jain conflicts given the current situation:      Living Environment:  Pt lives in a NH with   Prior to admission, patients level of function was dependent and mostly bed ridden.  Equipment used at home: wheelchair, hospital bed, lift device.  DME owned (not currently used):   Upon discharge, patient will have assistance from nh staff    Objective:     Communicated with nurse prior to session.  Patient found supine with    upon PT entry to room.    General Precautions: Standard,     Orthopedic Precautions:    Braces:    Respiratory Status: Room air    Exams:  · RLE ROM: hip is somewhat limited PROM, but right knee is severly limited PROM. No active movement  · RLE Strength: 0/5  · LLE ROM: WFL  · LLE Strength: 3/5    Functional Mobility:  · Bed Mobility:     · Rolling Left:  maximal  assistance  · Rolling Right: maximal assistance  · Scooting: total assistance  · Supine to Sit: maximal assistance  · Sit to Supine: total assistance  · Transfers:     · Sit to Stand:  total assistance with pt only coming to partial stand    Therapeutic Activities and Exercises:       AM-PAC 6 CLICK MOBILITY  Total Score:8     Patient left supine with call button in reach.    GOALS:   1. Pt will be moda with sit to supine and supine to sit  2. Pt will sit unsupported at EOB    History:     Past Medical History:   Diagnosis Date    Encephalopathy 12/24/2021       No past surgical history on file.    Time Tracking:     PT Received On:    PT Start Time: 1315     PT Stop Time: 1340  PT Total Time (min): 25 min     Billable Minutes: Evaluation 25 07/28/2022

## 2022-07-28 NOTE — SUBJECTIVE & OBJECTIVE
Interval History: Pt seen and evaluated    Review of Systems   Unable to perform ROS: Dementia   Objective:     Vital Signs (Most Recent):  Temp: 97.7 °F (36.5 °C) (07/28/22 0731)  Pulse: 74 (07/28/22 0732)  Resp: 18 (07/28/22 0732)  BP: 135/63 (07/28/22 0731)  SpO2: 99 % (07/28/22 0732) Vital Signs (24h Range):  Temp:  [97.7 °F (36.5 °C)-98.6 °F (37 °C)] 97.7 °F (36.5 °C)  Pulse:  [41-85] 74  Resp:  [18-24] 18  SpO2:  [96 %-99 %] 99 %  BP: (130-137)/(63-77) 135/63     Weight: 97.1 kg (214 lb 1.6 oz)  Body mass index is 37.93 kg/m².    Intake/Output Summary (Last 24 hours) at 7/28/2022 0923  Last data filed at 7/28/2022 0600  Gross per 24 hour   Intake 770 ml   Output 1300 ml   Net -530 ml      Physical Exam  Vitals and nursing note reviewed.   Constitutional:       General: She is not in acute distress.     Appearance: She is obese.   HENT:      Head: Normocephalic.      Nose: Nose normal.      Mouth/Throat:      Mouth: Mucous membranes are moist.      Pharynx: Oropharynx is clear.   Eyes:      General: No scleral icterus.     Extraocular Movements: Extraocular movements intact.      Pupils: Pupils are equal, round, and reactive to light.   Cardiovascular:      Rate and Rhythm: Normal rate and regular rhythm.      Pulses: Normal pulses.      Heart sounds: Normal heart sounds.   Pulmonary:      Effort: Pulmonary effort is normal.      Breath sounds: Rales present.   Abdominal:      General: Bowel sounds are normal. There is no distension.      Palpations: Abdomen is soft.      Tenderness: There is no abdominal tenderness. There is no guarding.   Musculoskeletal:         General: Normal range of motion.      Cervical back: Normal range of motion. No rigidity.   Skin:     General: Skin is warm and dry.      Capillary Refill: Capillary refill takes less than 2 seconds.      Comments: Wound to sacrum/buttocks   Neurological:      General: No focal deficit present.      Mental Status: She is alert.   Psychiatric:          Mood and Affect: Mood normal.         Behavior: Behavior normal.       Significant Labs: All pertinent labs within the past 24 hours have been reviewed.  CBC:   Recent Labs   Lab 07/27/22  0533 07/28/22  0347   WBC 4.94 5.03   HGB 9.5* 10.2*   HCT 30.7* 32.9*    176     CMP:   Recent Labs   Lab 07/27/22  0533 07/28/22  0347    144   K 4.8 4.3    108   CO2 32* 32*   * 218*   BUN 26* 26*   CREATININE 0.9 0.9   CALCIUM 8.4* 8.5*   PROT 5.5* 5.5*   ALBUMIN 2.3* 2.3*   BILITOT 0.3 0.3   ALKPHOS 78 78   AST 10 7*   ALT 13 12   ANIONGAP 3* 4*   EGFRNONAA 58.9* 58.9*     Magnesium:   Recent Labs   Lab 07/28/22 0347   MG 1.6       Significant Imaging: I have reviewed all pertinent imaging results/findings within the past 24 hours.

## 2022-07-28 NOTE — PT/OT/SLP PROGRESS
Occupational Therapy   Evaluation    Name: Laisha Acosta  MRN: [id number]  Admitting Diagnosis:  Congestive heart failure  Recent Surgery: * No surgery found *      Recommendations:     Discharge Recommendations: nursing facility, skilled  Discharge Equipment Recommendations:  none  Barriers to discharge:  Other (Comment) (Medical status)    Assessment:     Laisha Acosta is a 86 y.o. female with a medical diagnosis of Congestive heart failure.  She presents with functional deficits impacting independence with ADL's including functional mobility. Performance deficits affecting function: weakness, impaired endurance, impaired self care skills, impaired functional mobility, impaired balance, decreased coordination, decreased upper extremity function, decreased lower extremity function, decreased safety awareness, pain, decreased ROM, impaired cardiopulmonary response to activity, impaired joint extensibility, impaired muscle length.      Rehab Prognosis: Fair; patient would benefit from acute skilled OT services to address these deficits and reach maximum level of function.       Plan:     Patient to be seen 6 x/week to address the above listed problems via self-care/home management, therapeutic activities, therapeutic exercises  · Plan of Care Expires: 08/04/22  · Plan of Care Reviewed with: patient    Subjective     Chief Complaint: weakness  Patient/Family Comments/goals: Pt would like to improve her activity tolerance.     Occupational Profile:  Living Environment: Pt currently is a Nursing Home resident along with her spouse.   Previous level of function: Dependent  Roles and Routines: BADL's  Equipment Used at Home:  wheelchair, hospital bed, lift device  Assistance upon Discharge: NH staff will provide.    Pain/Comfort:  · Pain Rating 1: 8/10  · Location - Side 1: Right  · Location 1: leg  · Pain Addressed 1: Reposition, Cessation of Activity, Distraction, Nurse notified  · Pain Rating Post-Intervention  1: 6/10    Patients cultural, spiritual, Anglican conflicts given the current situation: no    Objective:     Communicated with: nurse prior to session.  Patient found supine with telemetry, peripheral IV, PureWick, pressure relief boots, oxygen upon OT entry to room.    General Precautions: Standard, fall, hearing impaired   Orthopedic Precautions:N/A   Braces: N/A  Respiratory Status: Nasal cannula, flow 1 L/min    Occupational Performance:    Bed Mobility:    · Patient completed Rolling/Turning to Left with  maximal assistance  · Patient completed Rolling/Turning to Right with maximal assistance  · Patient completed Scooting/Bridging with maximal assistance  · Patient completed Supine to Sit with maximal assistance  · Patient completed Sit to Supine with maximal assistance    Functional Mobility/Transfers:  · Patient completed Sit <> Stand Transfer with total assistance  with  no assistive device   · Patient completed Bed <> Chair Transfer using Stand Pivot technique with total assistance with no assistive device  · Functional Mobility: Pt unable to ambulate secondary to (R) LE flexion contracture.     Activities of Daily Living:  · Feeding:  minimum assistance    · Grooming: moderate assistance    · Bathing: maximal assistance    · Upper Body Dressing: maximal assistance    · Lower Body Dressing: total assistance    · Toileting: total assistance      Cognitive/Visual Perceptual:  Cognitive/Psychosocial Skills:  -       Oriented to: Person, Place and Situation   -       Follows Commands/attention:Follows two-step commands  -       Communication: anomia  -       Memory: No Deficits noted  -       Safety awareness/insight to disability: impaired   -       Mood/Affect/Coping skills/emotional control: Anxious    Physical Exam:  Postural examination/scapula alignment: -       Rounded shoulders  -       Forward head  Sensation: -       Intact  light/touch bilateral UE's  Dominant hand: -       Right  Upper Extremity  Range of Motion:  -       Right Upper Extremity: Deficits: 78 degrees shoulder flexion and 75 degrees abduction  -       Left Upper Extremity: Deficits: 105 degrees shoulder flexion and abduction  Upper Extremity Strength: -       Right Upper Extremity: Deficits: 2+ to 3+ / 5  -       Left Upper Extremity: Deficits: 3- to 3+ / 5  Fine Motor Coordination: -       Impaired  Left hand, finger to nose  , Right hand, finger to nose  , Left hand, manipulation of objects   and Right hand, manipulation of objects    Gross motor coordination: Impaired bilateral UE hand-eye coordination    AMPA 6 Click ADL:  AMPAC Total Score: 11    Treatment & Education:  Pt was provided education / instruction regarding role of OT and established OT POC.  Education:    Patient left HOB elevated with all lines intact, call button in reach and nurse notified    GOALS:   Goals to be met by: 08/04/22     Patient will increase functional independence with ADLs by performing:    Feeding with Set-up Assistance.  UE Dressing with Moderate Assistance.  LE Dressing with Maximum Assistance.  Grooming while seated at sink with Minimal Assistance.  Toileting from bedside commode with Maximum Assistance for hygiene and clothing management.   Bathing from  edge of bed with Moderate Assistance.  Toilet transfer to bedside commode with Maximum Assistance.  Increased functional strength to 3- to 4-/5 for bilateral UE's.        History:     Past Medical History:   Diagnosis Date    Encephalopathy 12/24/2021       No past surgical history on file.    Time Tracking:     OT Date of Treatment: 07/28/22  OT Start Time: 1105  OT Stop Time: 1150  OT Total Time (min): 45 min    Billable Minutes:Evaluation 45    7/28/2022

## 2022-07-28 NOTE — PROGRESS NOTES
[facility] Medicine  Progress Note    Patient Name: Laisha Acosta  MRN: [id number]  Patient Class: IP- Inpatient   Admission Date: 7/26/2022  Length of Stay: 2 days  Attending Physician: Jo Underwood MD  Primary Care Provider: Jo Underwood MD        Subjective:     Principal Problem:Congestive heart failure        HPI:  Pt is 87 y/o female, resident of [facility], with Hx of HTN, HLD, DM II, Hypothyroid, and Dementia, presented to [facility] ED yesterday with c/o dyspnea and SpO2 in low 90s one oxygen x1 days. She is currently being treated for a Urinary Tract Infection.        Overview/Hospital Course:  No notes on file    Interval History: Pt seen and evaluated    Review of Systems   Unable to perform ROS: Dementia   Objective:     Vital Signs (Most Recent):  Temp: 97.7 °F (36.5 °C) (07/28/22 0731)  Pulse: 74 (07/28/22 0732)  Resp: 18 (07/28/22 0732)  BP: 135/63 (07/28/22 0731)  SpO2: 99 % (07/28/22 0732) Vital Signs (24h Range):  Temp:  [97.7 °F (36.5 °C)-98.6 °F (37 °C)] 97.7 °F (36.5 °C)  Pulse:  [41-85] 74  Resp:  [18-24] 18  SpO2:  [96 %-99 %] 99 %  BP: (130-137)/(63-77) 135/63     Weight: 97.1 kg (214 lb 1.6 oz)  Body mass index is 37.93 kg/m².    Intake/Output Summary (Last 24 hours) at 7/28/2022 0923  Last data filed at 7/28/2022 0600  Gross per 24 hour   Intake 770 ml   Output 1300 ml   Net -530 ml      Physical Exam  Vitals and nursing note reviewed.   Constitutional:       General: She is not in acute distress.     Appearance: She is obese.   HENT:      Head: Normocephalic.      Nose: Nose normal.      Mouth/Throat:      Mouth: Mucous membranes are moist.      Pharynx: Oropharynx is clear.   Eyes:      General: No scleral icterus.     Extraocular Movements: Extraocular movements intact.      Pupils: Pupils are equal, round, and reactive to light.   Cardiovascular:      Rate and Rhythm: Normal rate and regular rhythm. Murmur present.     Pulses: Normal pulses.       Heart sounds: Normal heart sounds.   Pulmonary:      Effort: Pulmonary effort is normal.      Breath sounds: Rales present.   Abdominal:      General: Bowel sounds are normal. There is no distension.      Palpations: Abdomen is soft.      Tenderness: There is no abdominal tenderness. There is no guarding.   Musculoskeletal:         General: Normal range of motion.      Cervical back: Normal range of motion. No rigidity.   Skin:     General: Skin is warm and dry.      Capillary Refill: Capillary refill takes less than 2 seconds.      Comments: Wound to sacrum/buttocks   Neurological:      General: No focal deficit present.      Mental Status: She is alert.   Psychiatric:         Mood and Affect: Mood normal.         Behavior: Behavior normal.       Significant Labs: All pertinent labs within the past 24 hours have been reviewed.  CBC:   Recent Labs   Lab 07/27/22 0533 07/28/22  0347   WBC 4.94 5.03   HGB 9.5* 10.2*   HCT 30.7* 32.9*    176     CMP:   Recent Labs   Lab 07/27/22 0533 07/28/22  0347    144   K 4.8 4.3    108   CO2 32* 32*   * 218*   BUN 26* 26*   CREATININE 0.9 0.9   CALCIUM 8.4* 8.5*   PROT 5.5* 5.5*   ALBUMIN 2.3* 2.3*   BILITOT 0.3 0.3   ALKPHOS 78 78   AST 10 7*   ALT 13 12   ANIONGAP 3* 4*   EGFRNONAA 58.9* 58.9*     Magnesium:   Recent Labs   Lab 07/28/22  0347   MG 1.6       Significant Imaging: I have reviewed all pertinent imaging results/findings within the past 24 hours.      Assessment/Plan:      * Congestive heart failure  Defer to Cardiology      Primary hypertension  Blood pressure stable. Monitor and adjust meds PRN.      Other hyperlipidemia  Continue statin therapy      Type 2 diabetes mellitus without complication, with long-term current use of insulin  Patient's FSGs are controlled on current medication regimen.  Last A1c reviewed-   Lab Results   Component Value Date    HGBA1C 7.0 (H) 07/27/2022     Most recent fingerstick glucose reviewed-   Recent Labs    Lab 07/27/22  1937   POCTGLUCOSE 434*     Current correctional scale  Medium  Maintain anti-hyperglycemic dose as follows-   Antihyperglycemics (From admission, onward)            Start     Stop Route Frequency Ordered    07/27/22 1420  insulin aspart U-100 pen 1-10 Units         -- SubQ Before meals & nightly PRN 07/27/22 1331        Hold Oral hypoglycemics while patient is in the hospital.    Other specified hypothyroidism  Continue home dose Synthroid      Acute cystitis with hematuria  7/28: IV abx with Rocephin per C&S 6/12/22 (day 3)      DVT prophylaxis  Lovenox        VTE Risk Mitigation (From admission, onward)         Ordered     enoxaparin injection 40 mg  Daily         07/26/22 1654     IP VTE HIGH RISK PATIENT  Once         07/26/22 1527     Place sequential compression device  Until discontinued         07/26/22 1527                Discharge Planning   KIANA:      Code Status: Full Code   Is the patient medically ready for discharge?:     Reason for patient still in hospital (select all that apply): Patient trending condition, Laboratory test and Treatment  Discharge Plan A: Return to nursing home                  Mary Beth Good NP  Department of Hospital Medicine   [facility]

## 2022-07-29 NOTE — PT/OT/SLP PROGRESS
Occupational Therapy   Treatment    Name: Laisha Acosta  MRN: [id number]  Admitting Diagnosis:  Congestive heart failure       Recommendations:     Discharge Recommendations: nursing facility, skilled  Discharge Equipment Recommendations:  none  Barriers to discharge:  Other (Comment) (Medical status)    Assessment:     Laisha Acosta is a 86 y.o. female with a medical diagnosis of Congestive heart failure. Performance deficits affecting function are weakness, impaired endurance, impaired self care skills, impaired functional mobility, impaired balance, decreased coordination, decreased upper extremity function, decreased lower extremity function, decreased safety awareness, pain, decreased ROM, impaired cardiopulmonary response to activity, impaired joint extensibility, impaired muscle length.     Rehab Prognosis:  Fair; patient would benefit from acute skilled OT services to address these deficits and reach maximum level of function.       Plan:     Patient to be seen 6 x/week to address the above listed problems via self-care/home management, therapeutic activities, therapeutic exercises  · Plan of Care Expires: 08/04/22  · Plan of Care Reviewed with: patient    Subjective     Pain/Comfort:  · Pain Rating 1: 8/10  · Location - Side 1: Right  · Location 1: leg  · Pain Addressed 1: Reposition, Cessation of Activity  · Pain Rating Post-Intervention 1: 5/10    Objective:     Communicated with: nurse prior to session.  Patient found supine with telemetry, peripheral IV, PureWick, pressure relief boots, oxygen upon OT entry to room.    General Precautions: Standard, fall, hearing impaired   Orthopedic Precautions:N/A   Braces:    Respiratory Status: Nasal cannula, flow 1 L/min     Occupational Performance:     Bed Mobility:    · Patient completed Rolling/Turning to Left with  maximal assistance  · Patient completed Rolling/Turning to Right with maximal assistance  · Patient completed Scooting/Bridging with  maximal assistance  · Patient completed Supine to Sit with maximal assistance  · Patient completed Sit to Supine with maximal assistance     Functional Mobility/Transfers:  · Patient completed Sit <> Stand Transfer with total assistance  with  no assistive device   · Functional Mobility: Pt unable to ambulate.    Treatment & Education:  Pt was cooperative and motivated with verbal encouragement while exhibiting mostly positive affect. She performed bed mobility requiring max assist secondary to much lifting assist and stabilization of trunk with additional assist with bilateral LE's during supine to sit transition, much scooting assist at bilateral hips to EOB, and lowering assist of trunk with much lifting assist of bilateral LE's during sit to supine transition. Pt then participated in therapeutic activity addressing trunk control, trunk strength, static / dynamic sitting balance, functional reaching, gross motor coordination, and energy for task / endurance challenging her to sustain upright seated position at EOB unsupported while intermittently reaching in multiple planes utilizing bilateral UE's requiring steadying assist with minimal excursions of trunk with additional verbal and tactile cueing for proper weight shifting when reaching or righting self with static sitting.    Patient left HOB elevated with all lines intact, call button in reach and nurse notifiedEducation:      GOALS:   Multidisciplinary Problems     Occupational Therapy Goals        Problem: Occupational Therapy    Goal Priority Disciplines Outcome Interventions   Occupational Therapy Goal     OT, PT/OT     Description: Goals to be met by: 08/04/22     Patient will increase functional independence with ADLs by performing:    Feeding with Set-up Assistance.  UE Dressing with Moderate Assistance.  LE Dressing with Maximum Assistance.  Grooming while seated at sink with Minimal Assistance.  Toileting from bedside commode with Maximum Assistance  for hygiene and clothing management.   Bathing from  edge of bed with Moderate Assistance.  Toilet transfer to bedside commode with Maximum Assistance.  Increased functional strength to 3- to 4-/5 for bilateral UE's.                     Time Tracking:     OT Date of Treatment: 07/29/22  OT Start Time: 1220  OT Stop Time: 1255  OT Total Time (min): 35 min    Billable Minutes:Therapeutic Activity 35    OT/FARHAD: OT          7/29/2022

## 2022-07-29 NOTE — PT/OT/SLP PROGRESS
Physical Therapy Treatment    Patient Name:  Laisha Acosta   MRN:  [id number]    Recommendations:     Discharge Recommendations:  nursing facility, basic   Discharge Equipment Recommendations: none   Barriers to discharge: decreased fxn'l mobility and waekness    Assessment:     Laisha Acosta is a 86 y.o. female admitted with a medical diagnosis of Congestive heart failure.  She presents with the following impairments/functional limitations:  weakness, impaired functional mobility . Pt is very limited due to both trunk weakness and RLe contracture and weakness.    Rehab Prognosis: Poor; patient would benefit from acute skilled PT services to address these deficits and reach maximum level of function.    Recent Surgery: * No surgery found *      Plan:     During this hospitalization, patient to be seen 6 x/week to address the identified rehab impairments via therapeutic activities, therapeutic exercises and progress toward the following goals:    · Plan of Care Expires:       Subjective     Chief Complaint: right leg pain  Patient/Family Comments/goals:   Pain/Comfort:  ·        Objective:     Communicated with nurse prior to session.  Patient found supine with   upon PT entry to room.     General Precautions: Standard,     Orthopedic Precautions:    Braces:    Respiratory Status: Nasal cannula, flow   L/min     Functional Mobility:  · Bed Mobility:     · Rolling Left:  maximal assistance  · Rolling Right: maximal assistance  · Scooting: total assistance  · Supine to Sit: maximal assistance  · Sit to Supine: maximal assistance      AM-PAC 6 CLICK MOBILITY          Therapeutic Activities and Exercises:  Pt performed AROM LLE and AAROM for slight hip flex in the supine and supported sit positiion. She can help support with upper body but without Upper body,she can't    Patient left supine with call button in reach..    GOALS:   Multidisciplinary Problems     Physical Therapy Goals     Not on file                 Time Tracking:     PT Received On:    PT Start Time: 1600     PT Stop Time: 1620  PT Total Time (min): 20 min     Billable Minutes: Therapeutic Activity 20 07/29/2022

## 2022-07-29 NOTE — RESPIRATORY THERAPY
Attempted to wean patient to room air at this time. Oxygen saturation 99% on 1LNC. Placed patient on room air. Oxygen saturation 96% at rest. Patient does appear to be short of breath after 2-3 minutes. RT at bedside. Placed patient back on 1LNC. Patient states shortness of breath improved. CONSTANCE Jewell notified.

## 2022-07-29 NOTE — PROGRESS NOTES
Cardiology Progress Note  ([facility])    Today's Date:  7/29/2022  Patient Name: Laisha Acosta    MRN: [id number]    Code Status: Full Code     Attending Physician: Jo Underwood MD   Consulting Physician: JOSÉ MIGUEL Donnelly MD    Hospital Course:     Clinical:  Patient more alert today.  She states that she is feeling better, and breathing better.    Hemodynamic:    Vital signs have been stable.    Telemetry:    Sinus rhythm.    Relevant testing:    N/A    Review of Systems   Cardiovascular: Negative for chest pain, dyspnea on exertion and leg swelling.   Respiratory: Negative for shortness of breath and wheezing.        Vital Signs (Most Recent):  Temp: 98 °F (36.7 °C) (07/29/22 1711)  Pulse: 92 (07/29/22 1711)  Resp: 20 (07/29/22 1711)  BP: (!) 106/57 (07/29/22 1711)  SpO2: 96 % (07/29/22 1711) Vital Signs (24h Range):  Temp:  [97.7 °F (36.5 °C)-98.2 °F (36.8 °C)] 98 °F (36.7 °C)  Pulse:  [51-92] 92  Resp:  [18-20] 20  SpO2:  [96 %-100 %] 96 %  BP: (106-150)/(57-81) 106/57     Weight: 97.1 kg (214 lb 1.6 oz)  Body mass index is 37.93 kg/m².    SpO2: 96 %  O2 Device (Oxygen Therapy): nasal cannula      Intake/Output Summary (Last 24 hours) at 7/29/2022 1746  Last data filed at 7/29/2022 0559  Gross per 24 hour   Intake 1120 ml   Output 1800 ml   Net -680 ml     Labs:  CMP   Recent Labs   Lab 07/28/22  0347 07/29/22  0523    143   K 4.3 4.6    105   CO2 32* 36*   * 242*   BUN 26* 25*   CREATININE 0.9 1.0   CALCIUM 8.5* 9.1   PROT 5.5* 5.3*   ALBUMIN 2.3* 2.3*   BILITOT 0.3 0.3   ALKPHOS 78 74   AST 7* 10   ALT 12 14   ANIONGAP 4* 2*   ESTGFRAFRICA >60.0 59.8*   EGFRNONAA 58.9* 51.9*    and CBC   Recent Labs   Lab 07/28/22  0347 07/29/22  0523   WBC 5.03 5.35   HGB 10.2* 10.7*   HCT 32.9* 34.8*    189       Physical Examination:  General: Awake.  Complains of shortness of breath.  Although patient not overtly dyspneic.  Heent:   No JVD.  Lungs:   Decreased breath sounds; poor  inspiratory effort.  (Breath sounds are improved.)  Heart:   Regular rhythm; grade 2-3 systolic ejection murmur.  Extremities:   Trace edema.  Assessment and Plan:    1.  Congestive heart failure:    Responding well to antibiotic and diuretic therapy.  -- Change Lasix to 40 mg p.o. every 12 hours  -- Continue with oxygen therapy.  --  continue carvedilol 3.125 mg twice daily  --  Discontinue Nitropatch 0.4 mg/h   -- Isosorbide mononitrate 30 mg daily.  -- Jardiance when available.      2.  Elevated troponin I: consistent with a non-ST segment elevated MI.  -- Aspirin 81 mg daily  -- Hold Plavix 75 mg   -- Carvedilol  -- Nitrate therapy  -- Watch closely for dysrhythmias.  -- Continue conservative management, given chronic comorbid state.     3.  Hypertension: well-controlled.  --   Continue current therapy.     4.  Hyperlipidemia: restart statin therapy.  -- Simvastatin 20 mg daily.     5.  Diabetes mellitus: as per primary care team.     6.  Hypothyroidism: restart levothyroxine at 150 mcg/day.  -- TSH at the discretion of her primary care physician     7.  UTI: on antibiotic therapy.       8.  Valvular disease: by auscultation, there is a grade 3 murmur.  · Patient is in a chronic comorbid state,  and therefore not a candidate for any type of valvular intervention.

## 2022-07-29 NOTE — ASSESSMENT & PLAN NOTE
Patient's FSGs are controlled on current medication regimen.  Last A1c reviewed-   Lab Results   Component Value Date    HGBA1C 7.0 (H) 07/27/2022     Most recent fingerstick glucose reviewed-   Recent Labs   Lab 07/28/22  1128 07/28/22  1625 07/28/22  2049   POCTGLUCOSE 269* 278* 263*     Current correctional scale  Medium  Maintain anti-hyperglycemic dose as follows-   Antihyperglycemics (From admission, onward)            Start     Stop Route Frequency Ordered    07/27/22 1420  insulin aspart U-100 pen 1-10 Units         -- SubQ Before meals & nightly PRN 07/27/22 1331        Hold Oral hypoglycemics while patient is in the hospital.

## 2022-07-29 NOTE — SUBJECTIVE & OBJECTIVE
Interval History: Pt seen and evaluated    Review of Systems   Unable to perform ROS: Dementia   Objective:     Vital Signs (Most Recent):  Temp: 97.9 °F (36.6 °C) (07/29/22 0727)  Pulse: 83 (07/29/22 0727)  Resp: 20 (07/29/22 0727)  BP: 139/64 (07/29/22 0908)  SpO2: 96 % (07/29/22 0727) Vital Signs (24h Range):  Temp:  [97.8 °F (36.6 °C)-99.5 °F (37.5 °C)] 97.9 °F (36.6 °C)  Pulse:  [51-92] 83  Resp:  [18-24] 20  SpO2:  [96 %-100 %] 96 %  BP: (118-171)/(64-81) 139/64     Weight: 97.1 kg (214 lb 1.6 oz)  Body mass index is 37.93 kg/m².    Intake/Output Summary (Last 24 hours) at 7/29/2022 0945  Last data filed at 7/29/2022 0559  Gross per 24 hour   Intake 1120 ml   Output 1800 ml   Net -680 ml        Physical Exam  Vitals and nursing note reviewed.   Constitutional:       General: She is not in acute distress.     Appearance: She is obese.   HENT:      Head: Normocephalic.      Nose: Nose normal.      Mouth/Throat:      Mouth: Mucous membranes are moist.      Pharynx: Oropharynx is clear.   Eyes:      General: No scleral icterus.     Extraocular Movements: Extraocular movements intact.      Pupils: Pupils are equal, round, and reactive to light.   Cardiovascular:      Rate and Rhythm: Normal rate and regular rhythm.      Pulses: Normal pulses.      Heart sounds: Normal heart sounds.   Pulmonary:      Effort: Pulmonary effort is normal.      Breath sounds: Normal breath sounds.   Abdominal:      General: Bowel sounds are normal. There is no distension.      Palpations: Abdomen is soft.      Tenderness: There is no abdominal tenderness. There is no guarding.   Musculoskeletal:         General: Normal range of motion.      Cervical back: Normal range of motion. No rigidity.   Skin:     General: Skin is warm and dry.      Capillary Refill: Capillary refill takes less than 2 seconds.      Comments: Wound to sacrum/buttocks   Neurological:      General: No focal deficit present.      Mental Status: She is alert.    Psychiatric:         Mood and Affect: Mood normal.         Behavior: Behavior normal.       Significant Labs: All pertinent labs within the past 24 hours have been reviewed.  CBC:   Recent Labs   Lab 07/28/22 0347 07/29/22  0523   WBC 5.03 5.35   HGB 10.2* 10.7*   HCT 32.9* 34.8*    189       CMP:   Recent Labs   Lab 07/28/22 0347 07/29/22  0523    143   K 4.3 4.6    105   CO2 32* 36*   * 242*   BUN 26* 25*   CREATININE 0.9 1.0   CALCIUM 8.5* 9.1   PROT 5.5* 5.3*   ALBUMIN 2.3* 2.3*   BILITOT 0.3 0.3   ALKPHOS 78 74   AST 7* 10   ALT 12 14   ANIONGAP 4* 2*   EGFRNONAA 58.9* 51.9*       Magnesium:   Recent Labs   Lab 07/28/22 0347 07/29/22  0523   MG 1.6 1.7         Significant Imaging: I have reviewed all pertinent imaging results/findings within the past 24 hours.

## 2022-07-29 NOTE — ASSESSMENT & PLAN NOTE
7/29: Received 3 days of IV Rocephin. Antibiotics changed based on urine culture to IV Levaquin (day 1).

## 2022-07-29 NOTE — PROGRESS NOTES
[facility] Medicine  Progress Note    Patient Name: Laisha Acosta  MRN: [id number]  Patient Class: IP- Inpatient   Admission Date: 7/26/2022  Length of Stay: 3 days  Attending Physician: Jo Underwood MD  Primary Care Provider: Jo Underwood MD        Subjective:     Principal Problem:Congestive heart failure        HPI:  Pt is 87 y/o female, resident of [facility], with Hx of HTN, HLD, DM II, Hypothyroid, and Dementia, presented to [facility] ED yesterday with c/o dyspnea and SpO2 in low 90s one oxygen x1 days. She is currently being treated for a Urinary Tract Infection.        Overview/Hospital Course:  7/29 SD: Pt laying in bed. Urine culture sensitivity resulted - IV antibiotics changed based on sensitivity result. Plan for possible discharge to [facility] on Monday.      Interval History: Pt seen and evaluated    Review of Systems   Unable to perform ROS: Dementia   Objective:     Vital Signs (Most Recent):  Temp: 97.9 °F (36.6 °C) (07/29/22 0727)  Pulse: 83 (07/29/22 0727)  Resp: 20 (07/29/22 0727)  BP: 139/64 (07/29/22 0908)  SpO2: 96 % (07/29/22 0727) Vital Signs (24h Range):  Temp:  [97.8 °F (36.6 °C)-99.5 °F (37.5 °C)] 97.9 °F (36.6 °C)  Pulse:  [51-92] 83  Resp:  [18-24] 20  SpO2:  [96 %-100 %] 96 %  BP: (118-171)/(64-81) 139/64     Weight: 97.1 kg (214 lb 1.6 oz)  Body mass index is 37.93 kg/m².    Intake/Output Summary (Last 24 hours) at 7/29/2022 0945  Last data filed at 7/29/2022 0559  Gross per 24 hour   Intake 1120 ml   Output 1800 ml   Net -680 ml        Physical Exam  Vitals and nursing note reviewed.   Constitutional:       General: She is not in acute distress.     Appearance: She is obese.   HENT:      Head: Normocephalic.      Nose: Nose normal.      Mouth/Throat:      Mouth: Mucous membranes are moist.      Pharynx: Oropharynx is clear.   Eyes:      General: No scleral icterus.     Extraocular Movements: Extraocular movements intact.       Pupils: Pupils are equal, round, and reactive to light.   Cardiovascular:      Rate and Rhythm: Normal rate and regular rhythm.      Pulses: Normal pulses.      Heart sounds: Normal heart sounds.   Pulmonary:      Effort: Pulmonary effort is normal.      Breath sounds: Normal breath sounds.   Abdominal:      General: Bowel sounds are normal. There is no distension.      Palpations: Abdomen is soft.      Tenderness: There is no abdominal tenderness. There is no guarding.   Musculoskeletal:         General: Normal range of motion.      Cervical back: Normal range of motion. No rigidity.   Skin:     General: Skin is warm and dry.      Capillary Refill: Capillary refill takes less than 2 seconds.      Comments: Wound to sacrum/buttocks   Neurological:      General: No focal deficit present.      Mental Status: She is alert.   Psychiatric:         Mood and Affect: Mood normal.         Behavior: Behavior normal.       Significant Labs: All pertinent labs within the past 24 hours have been reviewed.  CBC:   Recent Labs   Lab 07/28/22 0347 07/29/22  0523   WBC 5.03 5.35   HGB 10.2* 10.7*   HCT 32.9* 34.8*    189       CMP:   Recent Labs   Lab 07/28/22 0347 07/29/22  0523    143   K 4.3 4.6    105   CO2 32* 36*   * 242*   BUN 26* 25*   CREATININE 0.9 1.0   CALCIUM 8.5* 9.1   PROT 5.5* 5.3*   ALBUMIN 2.3* 2.3*   BILITOT 0.3 0.3   ALKPHOS 78 74   AST 7* 10   ALT 12 14   ANIONGAP 4* 2*   EGFRNONAA 58.9* 51.9*       Magnesium:   Recent Labs   Lab 07/28/22 0347 07/29/22  0523   MG 1.6 1.7         Significant Imaging: I have reviewed all pertinent imaging results/findings within the past 24 hours.      Assessment/Plan:      * Congestive heart failure  Defer to Cardiology      Primary hypertension  Blood pressure stable. Monitor and adjust meds PRN.      Other hyperlipidemia  Continue statin therapy      Type 2 diabetes mellitus without complication, with long-term current use of insulin  Patient's FSGs  are controlled on current medication regimen.  Last A1c reviewed-   Lab Results   Component Value Date    HGBA1C 7.0 (H) 07/27/2022     Most recent fingerstick glucose reviewed-   Recent Labs   Lab 07/28/22  1128 07/28/22  1625 07/28/22  2049   POCTGLUCOSE 269* 278* 263*     Current correctional scale  Medium  Maintain anti-hyperglycemic dose as follows-   Antihyperglycemics (From admission, onward)            Start     Stop Route Frequency Ordered    07/27/22 1420  insulin aspart U-100 pen 1-10 Units         -- SubQ Before meals & nightly PRN 07/27/22 1331        Hold Oral hypoglycemics while patient is in the hospital.    Other specified hypothyroidism  Continue home dose Synthroid      Acute cystitis with hematuria  7/29: Received 3 days of IV Rocephin. Antibiotics changed based on urine culture to IV Levaquin (day 1).      DVT prophylaxis  Lovenox        VTE Risk Mitigation (From admission, onward)         Ordered     enoxaparin injection 40 mg  Daily         07/26/22 1654     IP VTE HIGH RISK PATIENT  Once         07/26/22 1527     Place sequential compression device  Until discontinued         07/26/22 1527                Discharge Planning   KIANA:      Code Status: Full Code   Is the patient medically ready for discharge?:     Reason for patient still in hospital (select all that apply): Patient trending condition, Laboratory test and Treatment  Discharge Plan A: Return to nursing home                  Mary Beth Good NP  Department of Hospital Medicine   [facility]

## 2022-07-29 NOTE — HOSPITAL COURSE
7/29 SD: Pt laying in bed. Urine culture sensitivity resulted - IV antibiotics changed based on sensitivity result. Plan for possible discharge to [facility] on Monday.    7/30 [place]: Pt awaiting return back to [facility].     7/31 [place]: cont. IV levaquin (D3 of 7)    8/1 SD: Discharge to nursing home. Continue abx therapy with Levaquin.

## 2022-07-31 PROBLEM — E83.42 HYPOMAGNESEMIA: Status: ACTIVE | Noted: 2022-01-01

## 2022-07-31 NOTE — PROGRESS NOTES
[facility] Medicine  Progress Note    Patient Name: Laisha Acosta  MRN: [id number]  Patient Class: IP- Inpatient   Admission Date: 7/26/2022  Length of Stay: 4 days  Attending Physician: Jo Underwood MD  Primary Care Provider: Jo Underwood MD    Subjective:     Principal Problem:Congestive heart failure    HPI:  Pt is 87 y/o female, resident of [facility], with Hx of HTN, HLD, DM II, Hypothyroid, and Dementia, presented to [facility] ED yesterday with c/o dyspnea and SpO2 in low 90s one oxygen x1 days. She is currently being treated for a Urinary Tract Infection.      Overview/Hospital Course:  7/29 SD: Pt laying in bed. Urine culture sensitivity resulted - IV antibiotics changed based on sensitivity result. Plan for possible discharge to [facility] on Monday.  7/30 [place]: Pt awaiting return back to [facility].       Interval History: Patient seen and examined.     Review of Systems   Unable to perform ROS: Dementia   Objective:     Vital Signs (Most Recent):  Temp: 98.3 °F (36.8 °C) (07/30/22 1925)  Pulse: 95 (07/30/22 2035)  Resp: 20 (07/30/22 2035)  BP: 115/67 (07/30/22 2115)  SpO2: 99 % (07/30/22 2035) Vital Signs (24h Range):  Temp:  [97.9 °F (36.6 °C)-98.6 °F (37 °C)] 98.3 °F (36.8 °C)  Pulse:  [88-97] 95  Resp:  [18-20] 20  SpO2:  [96 %-100 %] 99 %  BP: (115-168)/(61-93) 115/67     Weight: 97.1 kg (214 lb 1.6 oz)  Body mass index is 37.93 kg/m².    Intake/Output Summary (Last 24 hours) at 7/30/2022 2136  Last data filed at 7/30/2022 1800  Gross per 24 hour   Intake 1560 ml   Output 1800 ml   Net -240 ml      Physical Exam  Vitals and nursing note reviewed.   Constitutional:       General: She is not in acute distress.  HENT:      Head: Normocephalic.      Mouth/Throat:      Mouth: Mucous membranes are moist.      Pharynx: Oropharynx is clear.   Eyes:      General: No scleral icterus.     Extraocular Movements: Extraocular movements intact.      Pupils:  Pupils are equal, round, and reactive to light.   Cardiovascular:      Rate and Rhythm: Normal rate and regular rhythm.      Pulses: Normal pulses.      Heart sounds: Normal heart sounds.   Pulmonary:      Effort: Pulmonary effort is normal.      Breath sounds: Normal breath sounds.   Abdominal:      General: Bowel sounds are normal. There is no distension.      Palpations: Abdomen is soft.      Tenderness: There is no abdominal tenderness. There is no right CVA tenderness, left CVA tenderness or guarding.   Musculoskeletal:         General: Normal range of motion.      Cervical back: Normal range of motion. No rigidity.   Skin:     General: Skin is warm and dry.      Capillary Refill: Capillary refill takes less than 2 seconds.      Comments: Wound to sacrum/buttocks   Neurological:      General: No focal deficit present.      Mental Status: She is alert.      Comments: Oriented to self and place.   Psychiatric:         Mood and Affect: Mood normal.         Behavior: Behavior normal.     Significant Labs: All pertinent labs within the past 24 hours have been reviewed.  BMP:   Recent Labs   Lab 07/30/22 0521   *      K 4.2      CO2 38*   BUN 29*   CREATININE 1.0   CALCIUM 8.9   MG 1.4*     CBC:   Recent Labs   Lab 07/29/22 0523 07/30/22 0521   WBC 5.35 4.74   HGB 10.7* 10.3*   HCT 34.8* 32.9*    198     CMP:   Recent Labs   Lab 07/29/22 0523 07/30/22  0521    141   K 4.6 4.2    100   CO2 36* 38*   * 303*   BUN 25* 29*   CREATININE 1.0 1.0   CALCIUM 9.1 8.9   PROT 5.3* 5.4*   ALBUMIN 2.3* 2.3*   BILITOT 0.3 0.3   ALKPHOS 74 74   AST 10 9*   ALT 14 14   ANIONGAP 2* 3*   EGFRNONAA 51.9* 51.9*     Significant Imaging: I have reviewed all pertinent imaging results/findings within the past 24 hours.      Assessment/Plan:      * Congestive heart failure  Defer to Cardiology      Type 2 diabetes mellitus without complication, with long-term current use of insulin  Patient's  FSGs are controlled on current medication regimen.  Last A1c reviewed-   Lab Results   Component Value Date    HGBA1C 7.0 (H) 07/27/2022     Most recent fingerstick glucose reviewed-   Recent Labs   Lab 07/30/22  1601   POCTGLUCOSE 250*     Current correctional scale  Medium  Maintain anti-hyperglycemic dose as follows-   Antihyperglycemics (From admission, onward)            Start     Stop Route Frequency Ordered    07/27/22 1420  insulin aspart U-100 pen 1-10 Units         -- SubQ Before meals & nightly PRN 07/27/22 1331        Hold Oral hypoglycemics while patient is in the hospital.    Other hyperlipidemia  Continue statin therapy.      Primary hypertension  Blood pressure stable. Monitor and adjust meds PRN.      Other specified hypothyroidism  Continue home dose Synthroid, 125 mcg daily.       DVT prophylaxis  Lovenox      Acute cystitis with hematuria  7/29: Received 3 days of IV Rocephin. Antibiotics changed based on urine culture to IV Levaquin (day 1).  7/30: D2 Levaquin, no complaint, urine clear      VTE Risk Mitigation (From admission, onward)         Ordered     enoxaparin injection 40 mg  Daily         07/26/22 1654     IP VTE HIGH RISK PATIENT  Once         07/26/22 1527     Place sequential compression device  Until discontinued         07/26/22 1527                Discharge Planning   KIANA:      Code Status: Full Code   Is the patient medically ready for discharge?:     Reason for patient still in hospital (select all that apply): Patient trending condition, Treatment and Pending disposition  Discharge Plan A: Return to nursing home        Real Patterson DO  Department of Hospital Medicine   [facility]

## 2022-07-31 NOTE — ASSESSMENT & PLAN NOTE
7/29: Received 3 days of IV Rocephin. Antibiotics changed based on urine culture to IV Levaquin (day 1).  7/30: D2 Levaquin, no complaint, urine clear

## 2022-07-31 NOTE — PLAN OF CARE
07/31/22 1004   Medicare Message   Important Message from Medicare regarding Discharge Appeal Rights Given to patient/caregiver;Explained to patient/caregiver;Signed/date by patient/caregiver   Date IMM was signed 07/31/22   Time IMM was signed 0940       IMM discussed with patient.  Copy provided and documented.  Voiced understanding.

## 2022-07-31 NOTE — ASSESSMENT & PLAN NOTE
Patient's FSGs are controlled on current medication regimen.  Add nightly long acting insulin 10U.   Last A1c reviewed-   Lab Results   Component Value Date    HGBA1C 7.0 (H) 07/27/2022     Most recent fingerstick glucose reviewed-   Recent Labs   Lab 07/30/22  1601 07/30/22 2010 07/31/22  1142   POCTGLUCOSE 250* 237* 416*     Current correctional scale  Medium  Maintain anti-hyperglycemic dose as follows-   Antihyperglycemics (From admission, onward)            Start     Stop Route Frequency Ordered    07/27/22 1420  insulin aspart U-100 pen 1-10 Units         -- SubQ Before meals & nightly PRN 07/27/22 1331        Hold Oral hypoglycemics while patient is in the hospital.

## 2022-07-31 NOTE — PROGRESS NOTES
[facility] Medicine  Progress Note    Patient Name: Laisha Acosta  MRN: [id number]  Patient Class: IP- Inpatient   Admission Date: 7/26/2022  Length of Stay: 5 days  Attending Physician: Jo Underwood MD  Primary Care Provider: Jo Underwood MD    Subjective:     Principal Problem:Congestive heart failure  HPI:  Pt is 85 y/o female, resident of [facility], with Hx of HTN, HLD, DM II, Hypothyroid, and Dementia, presented to [facility] ED yesterday with c/o dyspnea and SpO2 in low 90s one oxygen x1 days. She is currently being treated for a Urinary Tract Infection.      Overview/Hospital Course:  7/29 SD: Pt laying in bed. Urine culture sensitivity resulted - IV antibiotics changed based on sensitivity result. Plan for possible discharge to [facility] on Monday.  7/30 [place]: Pt awaiting return back to [facility].   7/31 [place]: cont. IV levaquin (D3 of 7)      Interval History: Patient seen and examined.     Review of Systems   Unable to perform ROS: Dementia   Objective:     Vital Signs (Most Recent):  Temp: 98 °F (36.7 °C) (07/31/22 0442)  Pulse: 85 (07/31/22 0442)  Resp: 19 (07/31/22 0442)  BP: 133/72 (07/31/22 0442)  SpO2: 97 % (07/31/22 0442) Vital Signs (24h Range):  Temp:  [97.9 °F (36.6 °C)-98.6 °F (37 °C)] 98 °F (36.7 °C)  Pulse:  [85-97] 85  Resp:  [18-20] 19  SpO2:  [96 %-100 %] 97 %  BP: (115-140)/(59-72) 133/72     Weight: 97.1 kg (214 lb 1.6 oz)  Body mass index is 37.93 kg/m².    Intake/Output Summary (Last 24 hours) at 7/31/2022 0647  Last data filed at 7/30/2022 1800  Gross per 24 hour   Intake 840 ml   Output 1000 ml   Net -160 ml      Physical Exam  Vitals and nursing note reviewed.   Constitutional:       General: She is not in acute distress.  HENT:      Head: Normocephalic.      Mouth/Throat:      Mouth: Mucous membranes are moist.      Pharynx: Oropharynx is clear.   Eyes:      General: No scleral icterus.     Extraocular Movements: Extraocular  movements intact.      Pupils: Pupils are equal, round, and reactive to light.   Cardiovascular:      Rate and Rhythm: Normal rate and regular rhythm.      Pulses: Normal pulses.      Heart sounds: Normal heart sounds.   Pulmonary:      Effort: Pulmonary effort is normal.      Breath sounds: Normal breath sounds.   Abdominal:      General: Bowel sounds are normal. There is no distension.      Palpations: Abdomen is soft.      Tenderness: There is no abdominal tenderness. There is no right CVA tenderness, left CVA tenderness or guarding.   Musculoskeletal:         General: Normal range of motion.      Cervical back: Normal range of motion. No rigidity.   Skin:     General: Skin is warm and dry.      Capillary Refill: Capillary refill takes less than 2 seconds.      Comments: Wound to sacrum/buttocks   Neurological:      General: No focal deficit present.      Mental Status: She is alert.      Comments: Oriented to self and place.   Psychiatric:         Mood and Affect: Mood normal.         Behavior: Behavior normal.     Significant Labs: All pertinent labs within the past 24 hours have been reviewed.  BMP:   Recent Labs   Lab 07/31/22  0355   *      K 4.1   CL 99   CO2 38*   BUN 24*   CREATININE 0.9   CALCIUM 8.8   MG 1.1*     CBC:   Recent Labs   Lab 07/30/22  0521 07/31/22  0355   WBC 4.74 4.61   HGB 10.3* 9.8*   HCT 32.9* 31.3*    203     CMP:   Recent Labs   Lab 07/30/22  0521 07/31/22  0355    141   K 4.2 4.1    99   CO2 38* 38*   * 254*   BUN 29* 24*   CREATININE 1.0 0.9   CALCIUM 8.9 8.8   PROT 5.4* 5.3*   ALBUMIN 2.3* 2.3*   BILITOT 0.3 0.3   ALKPHOS 74 69   AST 9* 9*   ALT 14 12   ANIONGAP 3* 4*   EGFRNONAA 51.9* 58.9*     Significant Imaging: I have reviewed all pertinent imaging results/findings within the past 24 hours.      Assessment/Plan:      * Congestive heart failure  Defer to Cardiology      Hypomagnesemia  Replete, check level in AM.       Type 2 diabetes  mellitus without complication, with long-term current use of insulin  Patient's FSGs are controlled on current medication regimen.  Add nightly long acting insulin 10U.   Last A1c reviewed-   Lab Results   Component Value Date    HGBA1C 7.0 (H) 07/27/2022     Most recent fingerstick glucose reviewed-   Recent Labs   Lab 07/30/22  1601 07/30/22 2010 07/31/22  1142   POCTGLUCOSE 250* 237* 416*     Current correctional scale  Medium  Maintain anti-hyperglycemic dose as follows-   Antihyperglycemics (From admission, onward)            Start     Stop Route Frequency Ordered    07/27/22 1420  insulin aspart U-100 pen 1-10 Units         -- SubQ Before meals & nightly PRN 07/27/22 1331        Hold Oral hypoglycemics while patient is in the hospital.    Other hyperlipidemia  Continue statin therapy.      Primary hypertension  Normotensive. Monitor and adjust meds PRN.      Other specified hypothyroidism  Continue home dose Synthroid, 125 mcg daily.       DVT prophylaxis  Lovenox      Acute cystitis with hematuria  7/29: Received 3 days of IV Rocephin. Antibiotics changed based on urine culture to IV Levaquin (day 1).  7/30: D2 Levaquin, no complaint, urine clear  7/31: D3 Levaquin    VTE Risk Mitigation (From admission, onward)         Ordered     enoxaparin injection 40 mg  Daily         07/26/22 1654     IP VTE HIGH RISK PATIENT  Once         07/26/22 1527     Place sequential compression device  Until discontinued         07/26/22 1527              Discharge Planning   KIANA:      Code Status: Full Code   Is the patient medically ready for discharge?:     Reason for patient still in hospital (select all that apply): Treatment  Discharge Plan A: Return to nursing home        Real Patterson DO  Department of Hospital Medicine   [facility]

## 2022-07-31 NOTE — ASSESSMENT & PLAN NOTE
7/29: Received 3 days of IV Rocephin. Antibiotics changed based on urine culture to IV Levaquin (day 1).  7/30: D2 Levaquin, no complaint, urine clear  7/31: D3 Levaquin

## 2022-07-31 NOTE — SUBJECTIVE & OBJECTIVE
Interval History: Patient seen and examined.     Review of Systems   Unable to perform ROS: Dementia   Objective:     Vital Signs (Most Recent):  Temp: 98.3 °F (36.8 °C) (07/30/22 1925)  Pulse: 95 (07/30/22 2035)  Resp: 20 (07/30/22 2035)  BP: 115/67 (07/30/22 2115)  SpO2: 99 % (07/30/22 2035) Vital Signs (24h Range):  Temp:  [97.9 °F (36.6 °C)-98.6 °F (37 °C)] 98.3 °F (36.8 °C)  Pulse:  [88-97] 95  Resp:  [18-20] 20  SpO2:  [96 %-100 %] 99 %  BP: (115-168)/(61-93) 115/67     Weight: 97.1 kg (214 lb 1.6 oz)  Body mass index is 37.93 kg/m².    Intake/Output Summary (Last 24 hours) at 7/30/2022 2136  Last data filed at 7/30/2022 1800  Gross per 24 hour   Intake 1560 ml   Output 1800 ml   Net -240 ml      Physical Exam  Vitals and nursing note reviewed.   Constitutional:       General: She is not in acute distress.  HENT:      Head: Normocephalic.      Mouth/Throat:      Mouth: Mucous membranes are moist.      Pharynx: Oropharynx is clear.   Eyes:      General: No scleral icterus.     Extraocular Movements: Extraocular movements intact.      Pupils: Pupils are equal, round, and reactive to light.   Cardiovascular:      Rate and Rhythm: Normal rate and regular rhythm.      Pulses: Normal pulses.      Heart sounds: Normal heart sounds.   Pulmonary:      Effort: Pulmonary effort is normal.      Breath sounds: Normal breath sounds.   Abdominal:      General: Bowel sounds are normal. There is no distension.      Palpations: Abdomen is soft.      Tenderness: There is no abdominal tenderness. There is no right CVA tenderness, left CVA tenderness or guarding.   Musculoskeletal:         General: Normal range of motion.      Cervical back: Normal range of motion. No rigidity.   Skin:     General: Skin is warm and dry.      Capillary Refill: Capillary refill takes less than 2 seconds.      Comments: Wound to sacrum/buttocks   Neurological:      General: No focal deficit present.      Mental Status: She is alert.      Comments:  Oriented to self and place.   Psychiatric:         Mood and Affect: Mood normal.         Behavior: Behavior normal.     Significant Labs: All pertinent labs within the past 24 hours have been reviewed.  BMP:   Recent Labs   Lab 07/30/22  0521   *      K 4.2      CO2 38*   BUN 29*   CREATININE 1.0   CALCIUM 8.9   MG 1.4*     CBC:   Recent Labs   Lab 07/29/22 0523 07/30/22 0521   WBC 5.35 4.74   HGB 10.7* 10.3*   HCT 34.8* 32.9*    198     CMP:   Recent Labs   Lab 07/29/22 0523 07/30/22 0521    141   K 4.6 4.2    100   CO2 36* 38*   * 303*   BUN 25* 29*   CREATININE 1.0 1.0   CALCIUM 9.1 8.9   PROT 5.3* 5.4*   ALBUMIN 2.3* 2.3*   BILITOT 0.3 0.3   ALKPHOS 74 74   AST 10 9*   ALT 14 14   ANIONGAP 2* 3*   EGFRNONAA 51.9* 51.9*     Significant Imaging: I have reviewed all pertinent imaging results/findings within the past 24 hours.

## 2022-07-31 NOTE — ASSESSMENT & PLAN NOTE
Patient's FSGs are controlled on current medication regimen.  Last A1c reviewed-   Lab Results   Component Value Date    HGBA1C 7.0 (H) 07/27/2022     Most recent fingerstick glucose reviewed-   Recent Labs   Lab 07/30/22  1601   POCTGLUCOSE 250*     Current correctional scale  Medium  Maintain anti-hyperglycemic dose as follows-   Antihyperglycemics (From admission, onward)            Start     Stop Route Frequency Ordered    07/27/22 1420  insulin aspart U-100 pen 1-10 Units         -- SubQ Before meals & nightly PRN 07/27/22 1331        Hold Oral hypoglycemics while patient is in the hospital.

## 2022-07-31 NOTE — SUBJECTIVE & OBJECTIVE
Interval History: Patient seen and examined.     Review of Systems   Unable to perform ROS: Dementia   Objective:     Vital Signs (Most Recent):  Temp: 98 °F (36.7 °C) (07/31/22 0442)  Pulse: 85 (07/31/22 0442)  Resp: 19 (07/31/22 0442)  BP: 133/72 (07/31/22 0442)  SpO2: 97 % (07/31/22 0442) Vital Signs (24h Range):  Temp:  [97.9 °F (36.6 °C)-98.6 °F (37 °C)] 98 °F (36.7 °C)  Pulse:  [85-97] 85  Resp:  [18-20] 19  SpO2:  [96 %-100 %] 97 %  BP: (115-140)/(59-72) 133/72     Weight: 97.1 kg (214 lb 1.6 oz)  Body mass index is 37.93 kg/m².    Intake/Output Summary (Last 24 hours) at 7/31/2022 0647  Last data filed at 7/30/2022 1800  Gross per 24 hour   Intake 840 ml   Output 1000 ml   Net -160 ml      Physical Exam  Vitals and nursing note reviewed.   Constitutional:       General: She is not in acute distress.  HENT:      Head: Normocephalic.      Mouth/Throat:      Mouth: Mucous membranes are moist.      Pharynx: Oropharynx is clear.   Eyes:      General: No scleral icterus.     Extraocular Movements: Extraocular movements intact.      Pupils: Pupils are equal, round, and reactive to light.   Cardiovascular:      Rate and Rhythm: Normal rate and regular rhythm.      Pulses: Normal pulses.      Heart sounds: Normal heart sounds.   Pulmonary:      Effort: Pulmonary effort is normal.      Breath sounds: Normal breath sounds.   Abdominal:      General: Bowel sounds are normal. There is no distension.      Palpations: Abdomen is soft.      Tenderness: There is no abdominal tenderness. There is no right CVA tenderness, left CVA tenderness or guarding.   Musculoskeletal:         General: Normal range of motion.      Cervical back: Normal range of motion. No rigidity.   Skin:     General: Skin is warm and dry.      Capillary Refill: Capillary refill takes less than 2 seconds.      Comments: Wound to sacrum/buttocks   Neurological:      General: No focal deficit present.      Mental Status: She is alert.      Comments:  Oriented to self and place.   Psychiatric:         Mood and Affect: Mood normal.         Behavior: Behavior normal.     Significant Labs: All pertinent labs within the past 24 hours have been reviewed.  BMP:   Recent Labs   Lab 07/31/22  0355   *      K 4.1   CL 99   CO2 38*   BUN 24*   CREATININE 0.9   CALCIUM 8.8   MG 1.1*     CBC:   Recent Labs   Lab 07/30/22 0521 07/31/22  0355   WBC 4.74 4.61   HGB 10.3* 9.8*   HCT 32.9* 31.3*    203     CMP:   Recent Labs   Lab 07/30/22 0521 07/31/22  0355    141   K 4.2 4.1    99   CO2 38* 38*   * 254*   BUN 29* 24*   CREATININE 1.0 0.9   CALCIUM 8.9 8.8   PROT 5.4* 5.3*   ALBUMIN 2.3* 2.3*   BILITOT 0.3 0.3   ALKPHOS 74 69   AST 9* 9*   ALT 14 12   ANIONGAP 3* 4*   EGFRNONAA 51.9* 58.9*     Significant Imaging: I have reviewed all pertinent imaging results/findings within the past 24 hours.

## 2022-08-01 NOTE — ASSESSMENT & PLAN NOTE
7/29: Received 3 days of IV Rocephin. Antibiotics changed based on urine culture to IV Levaquin (day 1).  7/30: D2 Levaquin, no complaint, urine clear  7/31: D3 Levaquin  8/1:Continue abx therapy with Levaquin outpatient

## 2022-08-01 NOTE — DISCHARGE SUMMARY
[facility] Medicine  Discharge Summary      Patient Name: Laisha Acosta  MRN: [id number]  Patient Class: IP- Inpatient  Admission Date: 7/26/2022  Hospital Length of Stay: 6 days  Discharge Date and Time:  08/01/2022 12:43 PM  Attending Physician: Jo Underwood MD   Discharging Provider: Mary Beth Cornejo NP  Primary Care Provider: Jo Underwood MD      HPI:   Pt is 85 y/o female, resident of [facility], with Hx of HTN, HLD, DM II, Hypothyroid, and Dementia, presented to [facility] ED yesterday with c/o dyspnea and SpO2 in low 90s one oxygen x1 days. She is currently being treated for a Urinary Tract Infection.        * No surgery found *      Hospital Course:   7/29 [place]: Pt laying in bed. Urine culture sensitivity resulted - IV antibiotics changed based on sensitivity result. Plan for possible discharge to [facility] on Monday.    7/30 [place]: Pt awaiting return back to [facility].     7/31 [place]: cont. IV levaquin (D3 of 7)    8/1 [place]: Discharge to nursing home. Continue abx therapy with Levaquin.         Goals of Care Treatment Preferences:  Code Status: Full Code      Consults:   Consults (From admission, onward)        Status Ordering Provider     Inpatient consult to Podiatry  Once        Provider:  Miguel Smith DPM    Completed MARY BETH CORNEJO     Inpatient consult to Cardiology  Once        Provider:  Guille Donnelly MD    Completed MANDA PRICE          * Congestive heart failure  Follow-up with Cardiology after hospital discharge      Primary hypertension  Normotensive. Monitor and adjust meds PRN.      Other hyperlipidemia  Continue statin therapy.      Type 2 diabetes mellitus without complication, with long-term current use of insulin  Patient's FSGs are controlled on current medication regimen.  Add nightly long acting insulin 10U.   Last A1c reviewed-   Lab Results   Component Value Date    HGBA1C 7.0 (H) 07/27/2022     Most recent  fingerstick glucose reviewed-   Recent Labs   Lab 07/31/22  1620 07/31/22  2042 08/01/22  1109   POCTGLUCOSE 267* 254* 172*     Current correctional scale  Medium  Maintain anti-hyperglycemic dose as follows-   Antihyperglycemics (From admission, onward)            Start     Stop Route Frequency Ordered    07/31/22 2100  insulin detemir U-100 pen 10 Units         -- SubQ Nightly 07/31/22 1415    07/27/22 1420  insulin aspart U-100 pen 1-10 Units         -- SubQ Before meals & nightly PRN 07/27/22 1331        Hold Oral hypoglycemics while patient is in the hospital.    Other specified hypothyroidism  Continue home dose Synthroid, 125 mcg daily.       Acute cystitis with hematuria  7/29: Received 3 days of IV Rocephin. Antibiotics changed based on urine culture to IV Levaquin (day 1).  7/30: D2 Levaquin, no complaint, urine clear  7/31: D3 Levaquin  8/1:Continue abx therapy with Levaquin outpatient    DVT prophylaxis  Lovenox      Hypomagnesemia  Resolved        Final Active Diagnoses:    Diagnosis Date Noted POA    PRINCIPAL PROBLEM:  Congestive heart failure [I50.9] 07/27/2022 Yes    Primary hypertension [I10] 07/27/2022 Yes    Other hyperlipidemia [E78.49] 07/27/2022 Yes    Type 2 diabetes mellitus without complication, with long-term current use of insulin [E11.9, Z79.4] 07/27/2022 Not Applicable    Other specified hypothyroidism [E03.8] 06/11/2022 Yes    Acute cystitis with hematuria [N30.01] 12/24/2021 Yes    DVT prophylaxis [Z29.9] 12/27/2021 Not Applicable    Hypomagnesemia [E83.42] 07/31/2022 No      Problems Resolved During this Admission:       Discharged Condition: fair    Disposition: [facility]    Follow Up:   Follow-up Information     Jo Underwood MD In 2 days.    Specialty: Internal Medicine  Contact information:  [address] [address]  [phone]             Guille Donnelly MD .    Specialty: Cardiology  Contact information:  [facility]  [address]  [facility] [address]  [phone]                       Patient Instructions:      Comprehensive metabolic panel   Standing Status: Future Standing Exp. Date: 09/01/22     CBC auto differential   Standing Status: Future Standing Exp. Date: 09/01/22     Diet Cardiac     Activity as tolerated       Significant Diagnostic Studies: Labs: All labs within the past 24 hours have been reviewed    Pending Diagnostic Studies:     None         Medications:  Reconciled Home Medications:      Medication List      START taking these medications    carvediloL 3.125 MG tablet  Commonly known as: COREG  Take 1 tablet (3.125 mg total) by mouth 2 (two) times daily.     levoFLOXacin 500 MG tablet  Commonly known as: LEVAQUIN  Take 1 tablet (500 mg total) by mouth once daily. for 7 days  Start taking on: August 2, 2022        CONTINUE taking these medications    acetaminophen 325 MG tablet  Commonly known as: TYLENOL  Take 650 mg by mouth 2 (two) times daily.     amLODIPine 5 MG tablet  Commonly known as: NORVASC  Take 1 tablet (5 mg total) by mouth once daily.     bisacodyL 5 mg EC tablet  Commonly known as: DULCOLAX  Take 5 mg by mouth daily as needed for Constipation.     busPIRone 10 MG tablet  Commonly known as: BUSPAR  Take 1 tablet (10 mg total) by mouth 3 (three) times daily as needed (anxiety).     donepeziL 10 MG tablet  Commonly known as: ARICEPT  Take 10 mg by mouth every evening.     furosemide 40 MG tablet  Commonly known as: LASIX  Take 1 tablet (40 mg total) by mouth 2 (two) times daily.     gabapentin 100 MG capsule  Commonly known as: NEURONTIN  Take 100 mg by mouth every evening.     insulin aspart U-100 100 unit/mL (3 mL) Inpn pen  Commonly known as: NovoLOG  Inject 1-10 Units into the skin before meals and at bedtime as needed (Hyperglycemia).     insulin detemir U-100 100 unit/mL (3 mL) Inpn pen  Commonly known as: Levemir FLEXTOUCH  Inject 10 Units into the skin once daily.      levothyroxine 125 MCG tablet  Commonly known as: SYNTHROID  Take 150 mcg by mouth before breakfast.     pantoprazole 40 MG tablet  Commonly known as: PROTONIX  Take 40 mg by mouth once daily.     simvastatin 20 MG tablet  Commonly known as: ZOCOR  Take 20 mg by mouth every evening.        STOP taking these medications    albuterol 2.5 mg /3 mL (0.083 %) nebulizer solution  Commonly known as: PROVENTIL     albuterol-ipratropium 2.5 mg-0.5 mg/3 mL nebulizer solution  Commonly known as: DUO-NEB     aspirin 81 MG EC tablet  Commonly known as: ECOTRIN     baclofen 10 MG tablet  Commonly known as: LIORESAL     cyanocobalamin 1,000 mcg/mL injection     fluconazole 200 MG Tab  Commonly known as: DIFLUCAN     fluticasone propionate 50 mcg/actuation nasal spray  Commonly known as: FLONASE     insulin lispro 100 unit/mL injection     ipratropium 17 mcg/actuation inhaler  Commonly known as: ATROVENT HFA     mirtazapine 7.5 MG Tab  Commonly known as: REMERON     nitrofurantoin 50 MG capsule  Commonly known as: MACRODANTIN     phenyleph-min oil-petrolatum 0.25-14-74.9 % Oint     pramipexole 1 MG tablet  Commonly known as: MIRAPEX     sertraline 50 MG tablet  Commonly known as: ZOLOFT     venlafaxine 75 MG 24 hr capsule  Commonly known as: EFFEXOR-XR            Indwelling Lines/Drains at time of discharge:   Lines/Drains/Airways     None                 Time spent on the discharge of patient: <30 minutes         Mary Beth Good NP  Department of Hospital Medicine  [facility]

## 2022-08-01 NOTE — RESPIRATORY THERAPY
Pt's family in room states pt wears oxygen at nursing home. Unaware of liter flow. KACI Garcia notified.        08/01/22 0719   Patient Assessment/Suction   Level of Consciousness (AVPU) alert   Respiratory Effort Unlabored   Expansion/Accessory Muscles/Retractions no use of accessory muscles   Rhythm/Pattern, Respiratory unlabored   Cough Frequency infrequent   Cough Type nonproductive   PRE-TX-O2   O2 Device (Oxygen Therapy) nasal cannula   $ Is the patient on Low Flow Oxygen? Yes   Flow (L/min) 1   Oxygen Concentration (%) 24   SpO2 97 %   Pulse Oximetry Type Intermittent   $ Pulse Oximetry - Multiple Charge Pulse Oximetry - Multiple   Pulse 80   Resp 18   Positioning HOB elevated 30 degrees   Ready to Wean/Extubation Screen   FIO2<=50 (chart decimal) 0.24   Respiratory Evaluation   $ Care Plan Tech Time 15 min   Home Oxygen   Has Home Oxygen? Yes   Liter Flow   (pt's family unaware of liter flow at nursing home.)   Duration continuous   Route nasal cannula

## 2022-08-01 NOTE — PROGRESS NOTES
Pharmacist Intervention IV to PO Note    Laisha Acosta is a 87 y.o. female being treated with IV medication  levofloxacin    Patient Data:    Vital Signs (Most Recent):  Temp: 98.3 °F (36.8 °C) (08/01/22 0723)  Pulse: 80 (08/01/22 0723)  Resp: (!) 24 (08/01/22 0723)  BP: (!) 150/61 (08/01/22 0723)  SpO2: 96 % (08/01/22 0723)   Vital Signs (72h Range):  Temp:  [97.5 °F (36.4 °C)-98.6 °F (37 °C)]   Pulse:  []   Resp:  [18-24]   BP: (106-168)/(56-93)   SpO2:  [88 %-100 %]      CBC:  Recent Labs   Lab 07/30/22 0521 07/31/22 0355 08/01/22 0523   WBC 4.74 4.61 4.92   RBC 3.61* 3.47* 3.55*   HGB 10.3* 9.8* 10.2*   HCT 32.9* 31.3* 31.9*    203 200   MCV 91 90 90   MCH 28.5 28.2 28.7   MCHC 31.3* 31.3* 32.0     CMP:     Recent Labs   Lab 07/30/22 0521 07/31/22 0355 08/01/22 0523   * 254* 208*   CALCIUM 8.9 8.8 8.8   ALBUMIN 2.3* 2.3* 2.3*   PROT 5.4* 5.3* 5.2*    141 143   K 4.2 4.1 3.8   CO2 38* 38* 40*    99 100   BUN 29* 24* 19   CREATININE 1.0 0.9 1.0   ALKPHOS 74 69 68   ALT 14 12 13   AST 9* 9* 14   BILITOT 0.3 0.3 0.3       Dietary Orders:  Diet Orders  Report           Diet Cardiac: Cardiac starting at 07/26 1528            Based on the following criteria, this patient qualifies for intravenous to oral conversion:  [x] The patients gastrointestinal tract is functioning (tolerating medications via oral or enteral route for 24 hours and tolerating food or enteral feeds for 24 hours).  [x] The patient is hemodynamically stable for 24 hours (heart rate <100 beats per minute, systolic blood pressure >99 mm Hg, and respiratory rate <20 breaths per minute).  [x] The patient shows clinical improvement (afebrile for at least 24 hours and white blood cell count downtrending or normalized). Additionally, the patient must be non-neutropenic (absolute neutrophil count >500 cells/mm3).  [x] For antimicrobials, the patient has received IV therapy for at least 24 hours.    IV medication  levofloxacin will be changed to oral medication     Pharmacist's Name: IVY ELLIS  Pharmacist's Extension: [phone]

## 2022-08-01 NOTE — PT/OT/SLP PROGRESS
"Physical Therapy      Patient Name:  Laisha Acosta   MRN:  [id number]    Patient not seen today secondary to pt refused stating "I;m rajendra right now". Nurse and  confirm pt being DC back to NH today    "

## 2022-08-01 NOTE — PLAN OF CARE
[facility]  Discharge Final Note    Primary Care Provider: Jo Underwood MD    Expected Discharge Date: 8/1/2022    Final Discharge Note (most recent)     Final Note - 08/01/22 1450        Final Note    Assessment Type Final Discharge Note     Anticipated Discharge Disposition correction Nursing Home        Post-Acute Status    Discharge Delays Procedure Scheduling (IR, OR, Labs, Echo, Cath, Echo, EEG)   Pending COVID test and transportation.                Important Message from Medicare  Important Message from Medicare regarding Discharge Appeal Rights: Given to patient/caregiver, Explained to patient/caregiver, Signed/date by patient/caregiver     Date IMM was signed: 07/31/22  Time IMM was signed: 0940     Follow-up providers     Jo Underwood MD   Specialty: Internal Medicine   Relationship: PCP - General    [address] [address]   Phone: [phone]       Next Steps: Follow up in 2 day(s)    Guille Donnelly MD   Specialty: Cardiology    [address] [facility] [address]  [facility] [address]   Phone: [phone]       Next Steps: Follow up          After-discharge care            Destination     [facility]   Service: Nursing Home    [address] [facility] [address]   Phone: [phone]                     Final note is completed. The patient will be discharging back to [facility]. Will continue to monitor.

## 2022-08-01 NOTE — ASSESSMENT & PLAN NOTE
Patient's FSGs are controlled on current medication regimen.  Add nightly long acting insulin 10U.   Last A1c reviewed-   Lab Results   Component Value Date    HGBA1C 7.0 (H) 07/27/2022     Most recent fingerstick glucose reviewed-   Recent Labs   Lab 07/31/22  1620 07/31/22  2042 08/01/22  1109   POCTGLUCOSE 267* 254* 172*     Current correctional scale  Medium  Maintain anti-hyperglycemic dose as follows-   Antihyperglycemics (From admission, onward)            Start     Stop Route Frequency Ordered    07/31/22 2100  insulin detemir U-100 pen 10 Units         -- SubQ Nightly 07/31/22 1415    07/27/22 1420  insulin aspart U-100 pen 1-10 Units         -- SubQ Before meals & nightly PRN 07/27/22 1331        Hold Oral hypoglycemics while patient is in the hospital.

## 2022-08-12 NOTE — ASSESSMENT & PLAN NOTE
IV diuresis, will watch her urine output closely    12/27/21 [facility] get chest xray today, stop spironolactone and start lasix due to elevated potassium, continue oxygen get abg and monitor    12/28/21 [facility] see chest xray above, continue lasix and bipap intermittent and q hs    12/29/21 [facility] improving with lasix, continue to monitor.     12/30/21 [facility] stable    1/3/22 [facility] lasix for discharge and monitor.    3 attempts to contact pt regarding scheduling her HLA Lab appointment; Unable to reach pt; Voicemail is not set up. Appointment scheduled 8/19 ([facility]); Reminder letter sent.

## 2022-08-15 NOTE — ED NOTES
[facility] for [facility] and was informed that it will be another hour before unit arrives, pt notified

## 2022-08-15 NOTE — ED NOTES
"PT family member was asked to please step out of the room so roberson could be placed, pt family states, "Anival kim idiot right there."  PT family exited room.  Security was called, pt family remained in room  "

## 2022-08-16 NOTE — PHYSICIAN QUERY
PT Name: Laisha Acosta  MR #: [id number]     DOCUMENTATION CLARIFICATION      CDS/: Radha Paulson RN              Contact information:[email or web address]  This form is a permanent document in the medical record.    Query Date: August 16, 2022    By submitting this query, we are merely seeking further clarification of documentation to reflect the severity of illness of your patient. Please utilize your independent clinical judgment when addressing the question(s) below.     The Medical Record contains the following:   Indicators   Supporting Clinical Findings Location in Medical Record   X   Chest Pain, Angina No CP. No swelling.  ED Provider Note 7/26    Coronary Artery Disease     x EKG Sinus tachycardia   Nonspecific T wave abnormality   Abnormal ECG    EKG 7/26   x Troponin  Latest Reference Range & Units 07/26/22 05:59   Troponin I High Sensitivity 0.0 - 60.0 pg/mL 71.0 (H)       Lab    Echo Results      Angiography     x Documentation of acute cardiac condition  Elevated troponin I: consistent with a non-ST segment elevated MI.  -- Aspirin 81 mg daily  -- Plavix 75 mg 1 tablet daily (will need to watch closely for drop in hemoglobin given history of anemia)  -- Carvedilol  -- Nitrate therapy  -- Watch closely for dysrhythmias.      -- Continue conservative management. Consult 7/26       Cardiology                  Progress Note 7/27       Cardiology    Medication/Treatment     x Other  Minimally elevated troponin with elevated BNP.  Possible CHF.  Will start patient on Lasix. ED Provider Note 7/26      Provider, please clarify/confirm the Consultants diagnosis of NSTEMI related to the above documentation:  [   ] NSTEMI ruled in.     [  x ] NSTEMI/Myocardial Infarction Type 2 due to CHF     [   ] Elevated troponin only (without corresponding diagnosis)     [   ] Other Cardiac Diagnosis (please specify): _______________     [   ] Clinically Undetermined       Present on admission (POA) status:  [    ] Yes (Y)   [   ] No (N)   [   ] Documentation insufficient to determine if condition is POA (U)   [   ]  Clinically Undetermined (W)     Please document in your progress notes daily for the duration of treatment until resolved, and include in your discharge summary.  Form No. [id number]

## 2022-08-21 NOTE — ED PROVIDER NOTES
Encounter Date: 8/15/2022       History     Chief Complaint   Patient presents with    Shortness of Breath     Pt presents to ED via ems due to shortness of breath for an unspecified amount of time. According to ems, nursing home reports finding the patient complaining of shortness of breath and satting 85% on her normal 3lpm. Ems states that the nursing home did not treat the patient aside from turning her o2 up to 4lpm. Ems states that upon initial assessment, pt was 92% on 4lpm w/ no obvious signs of resp distress. EMS did not perform any interventions. Pt denies any complaints aside from sob.     87-year-old female with a history of CHF comes in by EMS complaining of shortness of breath for few days; worsening today.  According to EMS, nursing home states stated that the patient was satting 85% on her normal 3 L. Upon EMS arrival, she was 92% on 4 L. Patient denies cough, fever, chest pain.           Review of patient's allergies indicates:   Allergen Reactions    Codeine      Past Medical History:   Diagnosis Date    Encephalopathy 12/24/2021     No past surgical history on file.  No family history on file.     Review of Systems   Constitutional: Negative for fever.   HENT: Negative for sore throat.    Respiratory: Positive for shortness of breath.    Cardiovascular: Negative for chest pain.   Gastrointestinal: Negative for nausea.   Genitourinary: Negative for dysuria.   Musculoskeletal: Negative for back pain.   Skin: Negative for rash.   Neurological: Negative for weakness.   Hematological: Does not bruise/bleed easily.   All other systems reviewed and are negative.      Physical Exam     Initial Vitals [08/15/22 0029]   BP Pulse Resp Temp SpO2   (!) 142/89 67 (!) 26 97.5 °F (36.4 °C) (!) 91 %      MAP       --         Physical Exam    Nursing note and vitals reviewed.  Constitutional: She appears well-developed and well-nourished. She is not diaphoretic. No distress.   HENT:   Head: Normocephalic and  atraumatic.   Eyes: EOM are normal. Pupils are equal, round, and reactive to light.   Neck: Neck supple.   Normal range of motion.  Cardiovascular: Normal rate and regular rhythm.   Pulmonary/Chest: She has no wheezes.   Diminished at bases   Abdominal: Abdomen is soft. Bowel sounds are normal. There is no abdominal tenderness.   Musculoskeletal:         General: No tenderness or edema. Normal range of motion.      Cervical back: Normal range of motion and neck supple.     Neurological: She is alert and oriented to person, place, and time. She has normal strength. No cranial nerve deficit or sensory deficit.   Skin: Skin is warm and dry.   Psychiatric: She has a normal mood and affect. Thought content normal.         ED Course   Procedures  Labs Reviewed   CBC W/ AUTO DIFFERENTIAL - Abnormal; Notable for the following components:       Result Value    RBC 3.85 (*)     Hemoglobin 10.9 (*)     Hematocrit 35.9 (*)     MCHC 30.4 (*)     RDW 15.0 (*)     Platelets 125 (*)     Immature Granulocytes 0.9 (*)     Immature Grans (Abs) 0.05 (*)     Mono % 18.0 (*)     All other components within normal limits   COMPREHENSIVE METABOLIC PANEL - Abnormal; Notable for the following components:    CO2 33 (*)     Glucose 201 (*)     BUN 43 (*)     Total Protein 5.9 (*)     Albumin 2.5 (*)     Anion Gap 2 (*)     eGFR 55.2 (*)     All other components within normal limits   NT-PRO NATRIURETIC PEPTIDE - Abnormal; Notable for the following components:    NT-proBNP 9324 (*)     All other components within normal limits   URINALYSIS, REFLEX TO URINE CULTURE - Abnormal; Notable for the following components:    Leukocytes, UA Trace (*)     All other components within normal limits    Narrative:     Preferred Collection Type->Urine, Clean Catch  Specimen Source->Urine   URINALYSIS MICROSCOPIC - Abnormal; Notable for the following components:    WBC, UA 10 (*)     Hyaline Casts, UA 16.5 (*)     All other components within normal limits     Narrative:     Preferred Collection Type->Urine, Clean Catch  Specimen Source->Urine   LACTIC ACID, PLASMA   MAGNESIUM   PROTIME-INR   TROPONIN I HIGH SENSITIVITY   SARS-COV-2 RDRP GENE    Narrative:     This test utilizes isothermal nucleic acid amplification   technology to detect the SARS-CoV-2 RdRp nucleic acid segment.   The analytical sensitivity (limit of detection) is 125 genome   equivalents/mL.   A POSITIVE result implies infection with the SARS-CoV-2 virus;   the patient is presumed to be contagious.     A NEGATIVE result means that SARS-CoV-2 nucleic acids are not   present above the limit of detection. A NEGATIVE result should be   treated as presumptive. It does not rule out the possibility of   COVID-19 and should not be the sole basis for treatment decisions.   If COVID-19 is strongly suspected based on clinical and exposure   history, re-testing using an alternate molecular assay should be   considered.   This test is only for use under the Food and Drug   Administration s Emergency Use Authorization (EUA).   Commercial kits are provided by [employer].   Performance characteristics of the EUA have been independently   verified by [facility] Department of   Pathology and Laboratory Medicine.   _________________________________________________________________   The authorized Fact Sheet for Healthcare Providers and the authorized Fact   Sheet for Patients of the ID NOW COVID-19 are available on the FDA   website:     https://www.fda.gov/media/696599/download  https://www.fda.gov/media/872123/download               ECG Results          EKG 12-lead (Final result)  Result time 08/15/22 09:51:47    Final result by Interface, Lab In [facility] (08/15/22 09:51:47)                 Narrative:    Test Reason : R06.02,    Vent. Rate : 069 BPM     Atrial Rate : 069 BPM     P-R Int : 156 ms          QRS Dur : 076 ms      QT Int : 380 ms       P-R-T Axes : 005 014 009 degrees     QTc Int : 407  ms    Normal sinus rhythm with sinus arrhythmia  Cannot rule out Anterior infarct ,age undetermined  Nonspecific T wave abnormality  Abnormal ECG  When compared with ECG of 26-JUL-2022 05:13,  No significant change was found  Confirmed by Tal Arredondo MD ([id number]) on 8/15/2022 9:51:36 AM    Referred By: ERIC   SELF           Confirmed By:Tal Arredondo MD                             EKG 12-LEAD (Final result)  Result time 08/20/22 11:39:17    Final result by Unknown User (08/20/22 11:39:17)                                Imaging Results          X-Ray Chest 1 View (Final result)  Result time 08/15/22 08:57:46    Final result by Jaiden Wong MD (08/15/22 08:57:46)                 Impression:      Small bilateral pleural fluid collections.      Electronically signed by: Jaiden Wong MD  Date:    08/15/2022  Time:    08:57             Narrative:    EXAMINATION:  XR CHEST 1 VIEW    CLINICAL HISTORY:  Shortness of breath    COMPARISON:  Chest x-ray 07/26/2022.    FINDINGS:  Prominent AP cardiac silhouette.  Small bilateral pleural fluid collections with adjacent pulmonary opacities.  Upper lungs clear.                                 Medications   albuterol-ipratropium 2.5 mg-0.5 mg/3 mL nebulizer solution 3 mL (3 mLs Nebulization Given 8/15/22 0120)   furosemide injection 80 mg (80 mg Intravenous Given 8/15/22 0129)   cefTRIAXone (ROCEPHIN) 1 g/50 mL D5W IVPB (0 g Intravenous Stopped 8/15/22 0603)                Attending Attestation:             Attending ED Notes:   87-year-old female with a history of CHF comes in by EMS complaining of shortness of breath for few days; worsening today.  According to EMS, nursing home states stated that the patient was satting 85% on her normal 3 L. Upon EMS arrival, she was 92% on 4 L. Patient denies cough, fever, chest pain.  Presentation consistent with UTI, acute on chronic CHF, shortness of breath.  Rocephin and DuoNeb and Lasix given.  Patient diuresing.  Initially  patient satting 91% on her home O2, patient is satting 97% on home O2 settings and feeling much better after medications administered.  Labs, EKG, chest x-ray revealed no other acute/emergent pathology. Patient is non-toxic appearing, in no acute distress, and vital signs are stable and normal upon discharge. Upon completion of ED evaluation and management, with consideration of thorough differential diagnosis, the patient was found to have no acutely abnormal physical exam findings or other pathology requiring further emergent intervention or admission at this time. Patient/caregiver has no complaints upon discharge and verbalizes understanding and agreement with diagnosis and treatment plan. Discharge instructions with return precautions provided. Patient/caregiver verbalizes understanding to return to ED immediately for any new or worsening symptoms and to follow up with PCP/specialist recommended in 1-2 days.                      Clinical Impression:   Final diagnoses:  [R06.02] SOB (shortness of breath)  [I50.9] Acute on chronic congestive heart failure, unspecified heart failure type (Primary)  [N39.0] Urinary tract infection without hematuria, site unspecified          ED Disposition Condition    Discharge Stable        ED Prescriptions     Medication Sig Dispense Start Date End Date Auth. Provider    cephALEXin (KEFLEX) 500 MG capsule () Take 1 capsule (500 mg total) by mouth 4 (four) times daily. for 5 days 20 capsule 8/15/2022 2022 Raissa Izquierdo MD        Follow-up Information     Follow up With Specialties Details Why Contact Info Additional Information    Jo Underwood MD Internal Medicine Schedule an appointment as soon as possible for a visit in 1 day  [address] [place] [id number]  [phone]       your cardiologist  In 1 day       [facility] Emergency Department Emergency Medicine Go to  As needed, If symptoms worsen [address] [place] [address]  [phone]  [phone] Floor 1           Raissa Izquierdo MD  08/21/22 0506

## 2022-09-11 NOTE — ED NOTES
[facility] called to set up transfer for patient to return to facility spoke with leobardo and she states she will set up transportation with gabbie

## 2022-09-11 NOTE — ED PROVIDER NOTES
Encounter Date: 9/11/2022       History     Chief Complaint   Patient presents with    Blood Sugar Problem     Pt to ED via EMS from [facility] - pt found confused with CBG 44 -given juice by staff increasing to 62. Upon EMS arrival, levels dropped back down into 40's and given IM glucagon. Repeat CBG 80 during transport. Pt did not eat breakfast this morning. NH staff stated that family requested pt be transported due to increased confusion.      87-year-old female with multiple comorbidity brought in by EMS for increased confusion.  Patient was found to have a blood glucose of 44 by nursing home and was given glucagon.  Patient mentation has increased since then.  Patient has history of diabetes is on insulin.  Patient had her night meds but said that she did not eat much dinner because she did not like the food and did not eat breakfast this morning.  Denies any vomiting, diarrhea, dysuria, chest pain, abdominal pain cough    Review of patient's allergies indicates:   Allergen Reactions    Codeine      Past Medical History:   Diagnosis Date    Encephalopathy 12/24/2021     No past surgical history on file.  No family history on file.     Review of Systems   Constitutional: Negative.    HENT: Negative.     Respiratory: Negative.     Cardiovascular: Negative.    Gastrointestinal: Negative.    Genitourinary: Negative.    Musculoskeletal: Negative.    Skin: Negative.    Neurological: Negative.    Psychiatric/Behavioral: Negative.     All other systems reviewed and are negative.    Physical Exam     Initial Vitals   BP Pulse Resp Temp SpO2   09/11/22 1015 09/11/22 1015 09/11/22 1015 09/11/22 1017 09/11/22 1015   (!) 115/54 74 16 96.5 °F (35.8 °C) 96 %      MAP       --                Physical Exam    Nursing note and vitals reviewed.  Constitutional: Vital signs are normal.   Chronically ill appearing   HENT:   Head: Normocephalic and atraumatic.   Eyes: Conjunctivae and lids are normal.   Neck: Trachea normal. Neck supple.    Cardiovascular:  Normal rate, regular rhythm, normal heart sounds, intact distal pulses and normal pulses.           No murmur heard.  Pulmonary/Chest: Breath sounds normal. No respiratory distress.   Abdominal: Abdomen is soft. Bowel sounds are normal.   Musculoskeletal:         General: Normal range of motion.      Cervical back: Neck supple.     Neurological: She is alert and oriented to person, place, and time. She has normal strength.   Skin: Skin is warm. Capillary refill takes less than 2 seconds.   Psychiatric: She has a normal mood and affect. Her speech is normal. Thought content normal.       ED Course   Procedures  Labs Reviewed   POCT GLUCOSE - Abnormal; Notable for the following components:       Result Value    POCT Glucose 173 (*)     All other components within normal limits   POCT GLUCOSE - Abnormal; Notable for the following components:    POCT Glucose 262 (*)     All other components within normal limits          Imaging Results    None          Medications - No data to display              ED Course as of 09/28/22 0942   Sun Sep 11, 2022   1200 Glucose improved.  Patient able to eat.  Back to baseline mental status.  Will go back to nursing home.  Advised that patient should attempt to eat some food especially if she is going to get her insulin [HD]      ED Course User Index  [HD] Cullen Tariq MD             Clinical Impression:   Final diagnoses:  [E16.2] Hypoglycemia (Primary)      ED Disposition Condition    Discharge Stable          ED Prescriptions    None       Follow-up Information       Follow up With Specialties Details Why Contact Info    Jo Underwood MD Internal Medicine In 2 days  [address] [place] [address]  [phone]               Cullen Tariq MD  09/11/22 1202       Cullen Tariq MD  09/28/22 0912

## 2023-01-01 ENCOUNTER — HOSPITAL ENCOUNTER (EMERGENCY)
Facility: HOSPITAL | Age: 88
Discharge: ANOTHER HOSPITAL | End: 2023-04-29
Attending: STUDENT IN AN ORGANIZED HEALTH CARE EDUCATION/TRAINING PROGRAM
Payer: MEDICARE

## 2023-01-01 VITALS
TEMPERATURE: 99 F | WEIGHT: 196 LBS | HEART RATE: 82 BPM | DIASTOLIC BLOOD PRESSURE: 57 MMHG | SYSTOLIC BLOOD PRESSURE: 118 MMHG | OXYGEN SATURATION: 98 % | RESPIRATION RATE: 26 BRPM | BODY MASS INDEX: 34.72 KG/M2

## 2023-01-01 DIAGNOSIS — I21.4 NSTEMI (NON-ST ELEVATED MYOCARDIAL INFARCTION): Primary | ICD-10-CM

## 2023-01-01 DIAGNOSIS — R73.9 HYPERGLYCEMIA: ICD-10-CM

## 2023-01-01 DIAGNOSIS — R41.82 ALTERED MENTAL STATUS: ICD-10-CM

## 2023-01-01 LAB
ALBUMIN SERPL BCP-MCNC: 2.9 G/DL (ref 3.5–5.2)
ALP SERPL-CCNC: 106 U/L (ref 55–135)
ALT SERPL W/O P-5'-P-CCNC: 18 U/L (ref 10–44)
ANION GAP SERPL CALC-SCNC: -1 MMOL/L (ref 8–16)
APTT PPP: 25.1 SEC (ref 21–32)
AST SERPL-CCNC: 14 U/L (ref 10–40)
BACTERIA #/AREA URNS HPF: ABNORMAL /HPF
BACTERIA BLD CULT: NORMAL
BACTERIA BLD CULT: NORMAL
BACTERIA UR CULT: NORMAL
BACTERIA UR CULT: NORMAL
BASOPHILS # BLD AUTO: ABNORMAL K/UL (ref 0–0.2)
BASOPHILS NFR BLD: 0 % (ref 0–1.9)
BILIRUB SERPL-MCNC: 0.5 MG/DL (ref 0.1–1)
BILIRUB UR QL STRIP: NEGATIVE
BUN SERPL-MCNC: 48 MG/DL (ref 8–23)
CALCIUM SERPL-MCNC: 8.9 MG/DL (ref 8.7–10.5)
CAOX CRY URNS QL MICRO: ABNORMAL
CHLORIDE SERPL-SCNC: 107 MMOL/L (ref 95–110)
CLARITY UR: ABNORMAL
CO2 SERPL-SCNC: 35 MMOL/L (ref 23–29)
COLOR UR: YELLOW
CREAT SERPL-MCNC: 1.2 MG/DL (ref 0.5–1.4)
CTP QC/QA: YES
DIFFERENTIAL METHOD: ABNORMAL
EOSINOPHIL # BLD AUTO: ABNORMAL K/UL (ref 0–0.5)
EOSINOPHIL NFR BLD: 1 % (ref 0–8)
ERYTHROCYTE [DISTWIDTH] IN BLOOD BY AUTOMATED COUNT: 14 % (ref 11.5–14.5)
EST. GFR  (NO RACE VARIABLE): 44.4 ML/MIN/1.73 M^2
GLUCOSE SERPL-MCNC: 438 MG/DL (ref 70–110)
GLUCOSE UR QL STRIP: ABNORMAL
HCT VFR BLD AUTO: 36.2 % (ref 37–48.5)
HGB BLD-MCNC: 10.8 G/DL (ref 12–16)
HGB UR QL STRIP: ABNORMAL
HYALINE CASTS #/AREA URNS LPF: 8.6 /LPF
IMM GRANULOCYTES # BLD AUTO: ABNORMAL K/UL (ref 0–0.04)
IMM GRANULOCYTES NFR BLD AUTO: ABNORMAL % (ref 0–0.5)
INR PPP: 1 (ref 0.8–1.2)
KETONES UR QL STRIP: NEGATIVE
LACTATE SERPL-SCNC: 0.9 MMOL/L (ref 0.5–2.2)
LEUKOCYTE ESTERASE UR QL STRIP: ABNORMAL
LYMPHOCYTES # BLD AUTO: ABNORMAL K/UL (ref 1–4.8)
LYMPHOCYTES NFR BLD: 11 % (ref 18–48)
MAGNESIUM SERPL-MCNC: 2.2 MG/DL (ref 1.6–2.6)
MCH RBC QN AUTO: 29.1 PG (ref 27–31)
MCHC RBC AUTO-ENTMCNC: 29.8 G/DL (ref 32–36)
MCV RBC AUTO: 98 FL (ref 82–98)
MICROSCOPIC COMMENT: ABNORMAL
MONOCYTES # BLD AUTO: ABNORMAL K/UL (ref 0.3–1)
MONOCYTES NFR BLD: 17 % (ref 4–15)
MYELOCYTES NFR BLD MANUAL: 1 %
NEUTROPHILS NFR BLD: 63 % (ref 38–73)
NEUTS BAND NFR BLD MANUAL: 7 %
NITRITE UR QL STRIP: NEGATIVE
NRBC BLD-RTO: 0 /100 WBC
PH UR STRIP: 5 [PH] (ref 5–8)
PLATELET # BLD AUTO: 233 K/UL (ref 150–450)
PMV BLD AUTO: 11.1 FL (ref 9.2–12.9)
POCT GLUCOSE: 378 MG/DL (ref 70–110)
POCT GLUCOSE: 446 MG/DL (ref 70–110)
POTASSIUM SERPL-SCNC: 4.6 MMOL/L (ref 3.5–5.1)
PROT SERPL-MCNC: 6.8 G/DL (ref 6–8.4)
PROT UR QL STRIP: ABNORMAL
PROTHROMBIN TIME: 10.5 SEC (ref 9–12.5)
RBC # BLD AUTO: 3.71 M/UL (ref 4–5.4)
RBC #/AREA URNS HPF: 5 /HPF (ref 0–4)
SARS-COV-2 RDRP RESP QL NAA+PROBE: NEGATIVE
SODIUM SERPL-SCNC: 141 MMOL/L (ref 136–145)
SP GR UR STRIP: 1.02 (ref 1–1.03)
SQUAMOUS #/AREA URNS HPF: 8 /HPF
TROPONIN I SERPL DL<=0.01 NG/ML-MCNC: 1808.5 PG/ML (ref 0–60)
URN SPEC COLLECT METH UR: ABNORMAL
UROBILINOGEN UR STRIP-ACNC: 1 EU/DL
WBC # BLD AUTO: 9.57 K/UL (ref 3.9–12.7)
WBC #/AREA URNS HPF: >100 /HPF (ref 0–5)
YEAST URNS QL MICRO: ABNORMAL

## 2023-01-01 PROCEDURE — 99285 EMERGENCY DEPT VISIT HI MDM: CPT | Mod: 25

## 2023-01-01 PROCEDURE — 96374 THER/PROPH/DIAG INJ IV PUSH: CPT | Mod: 59

## 2023-01-01 PROCEDURE — 83735 ASSAY OF MAGNESIUM: CPT | Performed by: NURSE PRACTITIONER

## 2023-01-01 PROCEDURE — 63600175 PHARM REV CODE 636 W HCPCS: Performed by: STUDENT IN AN ORGANIZED HEALTH CARE EDUCATION/TRAINING PROGRAM

## 2023-01-01 PROCEDURE — 36415 COLL VENOUS BLD VENIPUNCTURE: CPT | Performed by: NURSE PRACTITIONER

## 2023-01-01 PROCEDURE — 84484 ASSAY OF TROPONIN QUANT: CPT | Performed by: NURSE PRACTITIONER

## 2023-01-01 PROCEDURE — 85007 BL SMEAR W/DIFF WBC COUNT: CPT | Performed by: NURSE PRACTITIONER

## 2023-01-01 PROCEDURE — 87086 URINE CULTURE/COLONY COUNT: CPT | Performed by: NURSE PRACTITIONER

## 2023-01-01 PROCEDURE — 96365 THER/PROPH/DIAG IV INF INIT: CPT

## 2023-01-01 PROCEDURE — 93010 ELECTROCARDIOGRAM REPORT: CPT | Mod: ,,, | Performed by: INTERNAL MEDICINE

## 2023-01-01 PROCEDURE — 87040 BLOOD CULTURE FOR BACTERIA: CPT | Performed by: NURSE PRACTITIONER

## 2023-01-01 PROCEDURE — 82962 GLUCOSE BLOOD TEST: CPT

## 2023-01-01 PROCEDURE — 25000003 PHARM REV CODE 250: Performed by: STUDENT IN AN ORGANIZED HEALTH CARE EDUCATION/TRAINING PROGRAM

## 2023-01-01 PROCEDURE — 81000 URINALYSIS NONAUTO W/SCOPE: CPT | Performed by: NURSE PRACTITIONER

## 2023-01-01 PROCEDURE — 85610 PROTHROMBIN TIME: CPT | Performed by: STUDENT IN AN ORGANIZED HEALTH CARE EDUCATION/TRAINING PROGRAM

## 2023-01-01 PROCEDURE — 96375 TX/PRO/DX INJ NEW DRUG ADDON: CPT

## 2023-01-01 PROCEDURE — 85027 COMPLETE CBC AUTOMATED: CPT | Performed by: NURSE PRACTITIONER

## 2023-01-01 PROCEDURE — 85730 THROMBOPLASTIN TIME PARTIAL: CPT | Performed by: STUDENT IN AN ORGANIZED HEALTH CARE EDUCATION/TRAINING PROGRAM

## 2023-01-01 PROCEDURE — 93010 EKG 12-LEAD: ICD-10-PCS | Mod: ,,, | Performed by: INTERNAL MEDICINE

## 2023-01-01 PROCEDURE — 93005 ELECTROCARDIOGRAM TRACING: CPT

## 2023-01-01 PROCEDURE — 36415 COLL VENOUS BLD VENIPUNCTURE: CPT | Performed by: STUDENT IN AN ORGANIZED HEALTH CARE EDUCATION/TRAINING PROGRAM

## 2023-01-01 PROCEDURE — 80053 COMPREHEN METABOLIC PANEL: CPT | Performed by: NURSE PRACTITIONER

## 2023-01-01 PROCEDURE — 83605 ASSAY OF LACTIC ACID: CPT | Performed by: NURSE PRACTITIONER

## 2023-01-01 RX ORDER — ASPIRIN 325 MG
325 TABLET ORAL
Status: COMPLETED | OUTPATIENT
Start: 2023-01-01 | End: 2023-01-01

## 2023-01-01 RX ORDER — HEPARIN SODIUM,PORCINE/D5W 25000/250
0-40 INTRAVENOUS SOLUTION INTRAVENOUS CONTINUOUS
Status: DISCONTINUED | OUTPATIENT
Start: 2023-01-01 | End: 2023-01-01 | Stop reason: HOSPADM

## 2023-01-01 RX ORDER — SODIUM CHLORIDE 9 MG/ML
1000 INJECTION, SOLUTION INTRAVENOUS
Status: COMPLETED | OUTPATIENT
Start: 2023-01-01 | End: 2023-01-01

## 2023-01-01 RX ADMIN — CEFTRIAXONE SODIUM 1 G: 1 INJECTION, POWDER, FOR SOLUTION INTRAMUSCULAR; INTRAVENOUS at 11:04

## 2023-01-01 RX ADMIN — ASPIRIN 325 MG ORAL TABLET 325 MG: 325 PILL ORAL at 10:04

## 2023-01-01 RX ADMIN — HEPARIN SODIUM 12 UNITS/KG/HR: 10000 INJECTION, SOLUTION INTRAVENOUS at 10:04

## 2023-01-01 RX ADMIN — SODIUM CHLORIDE 1000 ML: 9 INJECTION, SOLUTION INTRAVENOUS at 11:04

## 2023-01-01 RX ADMIN — INSULIN HUMAN 10 UNITS: 100 INJECTION, SOLUTION PARENTERAL at 11:04

## 2023-04-29 PROBLEM — R79.89 ELEVATED TROPONIN LEVEL: Status: ACTIVE | Noted: 2023-01-01

## 2023-04-29 NOTE — ED PROVIDER NOTES
Encounter Date: 4/28/2023       History     Chief Complaint   Patient presents with    Altered Mental Status     Pt presents to the ER w/ complaints of AMS. Per EMS pt was found altered and lethargic by [facility] staff, states cbg was over 500. Pt was given daily meds and 20 units of insulin by nursing home staff. EMS reports cbg of 480. NH staff reports recent respiratory illness.      This is an 87-year-old white female with multiple medical problems including dementia, CHF, hypertension, and insulin-dependent diabetes mellitus type 2 who presents to the emergency department from the nursing home with concerns regarding altered mental status.  Nursing home staff found the patient to be more lethargic than usual, prompting them to check her blood sugar which was found to be in the 500s.  Patient received 20units regular insulin and recheck upon arrival recorded at 442.  Nursing home staff also report that patient has been requiring supplemental O2 due to a cough, however no other information is available regarding this condition.  At the time of evaluation the patient denies pain or difficulty breathing; she does appear lethargic.    Review of patient's allergies indicates:   Allergen Reactions    Codeine      Past Medical History:   Diagnosis Date    Diabetes mellitus     Encephalopathy 12/24/2021    Hypertension     Osteoarthritis of both knees      Past Surgical History:   Procedure Laterality Date    TOTAL KNEE ARTHROPLASTY       No family history on file.     Review of Systems   Unable to perform ROS: Dementia (Most information obtained from EMS, patient unreliable source)   Constitutional:  Negative for fever.   Respiratory:  Positive for cough and shortness of breath.    Psychiatric/Behavioral:  Positive for confusion.      Physical Exam     Initial Vitals   BP Pulse Resp Temp SpO2   04/28/23 2201 04/28/23 2106 04/28/23 2106 04/28/23 2106 04/28/23 2106   134/61 84 (!) 26 98.9 °F (37.2 °C) 98 %      MAP       --                 Physical Exam    Nursing note and vitals reviewed.  Constitutional: She appears well-developed and well-nourished. She is active. No distress.   HENT:   Head: Normocephalic and atraumatic.   Eyes: EOM are normal. Pupils are equal, round, and reactive to light.   Neck: Neck supple.   Normal range of motion.  Cardiovascular:  Normal rate, regular rhythm and intact distal pulses.           Murmur heard.  Pulmonary/Chest: Breath sounds normal. She is in respiratory distress.   Patient demonstrates mild tachypnea with movement   Abdominal: Abdomen is soft. Bowel sounds are normal. She exhibits no distension. There is no abdominal tenderness.   Genitourinary:    Genitourinary Comments: Foul odor urine     Musculoskeletal:         General: Edema present.      Cervical back: Normal range of motion and neck supple.      Comments: Generalized weakness, muscle strength equal bilaterally     Neurological: She is alert and oriented to person, place, and time. GCS score is 15. GCS eye subscore is 4. GCS verbal subscore is 5. GCS motor subscore is 6.   Patient is oriented to place   Skin: Skin is warm and dry. Capillary refill takes less than 2 seconds.   Psychiatric:   Patient appears lethargic but does answer questions appropriately       ED Course   Procedures  Labs Reviewed   CBC W/ AUTO DIFFERENTIAL - Abnormal; Notable for the following components:       Result Value    RBC 3.71 (*)     Hemoglobin 10.8 (*)     Hematocrit 36.2 (*)     MCHC 29.8 (*)     Lymph % 11.0 (*)     Mono % 17.0 (*)     All other components within normal limits   COMPREHENSIVE METABOLIC PANEL - Abnormal; Notable for the following components:    CO2 35 (*)     Glucose 438 (*)     BUN 48 (*)     Albumin 2.9 (*)     Anion Gap -1 (*)     eGFR 44.4 (*)     All other components within normal limits   URINALYSIS, REFLEX TO URINE CULTURE - Abnormal; Notable for the following components:    Appearance, UA Cloudy (*)     Protein, UA 1+ (*)     Glucose,  UA 4+ (*)     Occult Blood UA 1+ (*)     Leukocytes, UA 2+ (*)     All other components within normal limits    Narrative:     Preferred Collection Type->Urine, Clean Catch  Specimen Source->Urine   TROPONIN I HIGH SENSITIVITY - Abnormal; Notable for the following components:    Troponin I High Sensitivity 1808.5 (*)     All other components within normal limits   URINALYSIS MICROSCOPIC - Abnormal; Notable for the following components:    RBC, UA 5 (*)     WBC, UA >100 (*)     Bacteria Few (*)     Hyaline Casts, UA 8.6 (*)     All other components within normal limits    Narrative:     Preferred Collection Type->Urine, Clean Catch  Specimen Source->Urine   POCT GLUCOSE - Abnormal; Notable for the following components:    POCT Glucose 446 (*)     All other components within normal limits   POCT GLUCOSE - Abnormal; Notable for the following components:    POCT Glucose 378 (*)     All other components within normal limits   CULTURE, BLOOD   CULTURE, BLOOD   CULTURE, URINE    Narrative:     Preferred Collection Type->Urine, Clean Catch  Specimen Source->Urine   LACTIC ACID, PLASMA   MAGNESIUM   APTT    Narrative:     Draw baseline aPTT prior to starting the heparin bolus or  infusion  (if patient is on warfarin prior to heparin therapy)   PROTIME-INR    Narrative:     Draw baseline aPTT prior to starting the heparin bolus or  infusion  (if patient is on warfarin prior to heparin therapy)   SARS-COV-2 RDRP GENE    Narrative:     This test utilizes isothermal nucleic acid amplification technology to detect the SARS-CoV-2 RdRp nucleic acid segment. The analytical sensitivity (limit of detection) is 500 copies/swab.     A POSITIVE result is indicative of the presence of SARS-CoV-2 RNA; clinical correlation with patient history and other diagnostic information is necessary to determine patient infection status.    A NEGATIVE result means that SARS-CoV-2 nucleic acids are not present above the limit of detection. A NEGATIVE  result should be treated as presumptive. It does not rule out the possibility of COVID-19 and should not be the sole basis for treatment decisions. If COVID-19 is strongly suspected based on clinical and exposure history, re-testing using an alternate molecular assay should be considered.     This test is only for use under the Food and Drug Administration s Emergency Use Authorization (EUA).     Commercial kits are provided by [employer]. Performance characteristics of the EUA have been independently verified by [facility] Department of Pathology and Laboratory Medicine.   _________________________________________________________________   The authorized Fact Sheet for Healthcare Providers and the authorized Fact Sheet for Patients of the ID NOW COVID-19 are available on the FDA website:    https://www.fda.gov/media/304512/download      https://www.fda.gov/media/959172/download        EKG Readings: (Independently Interpreted)   Initial Reading: No STEMI. Rhythm: Normal Sinus Rhythm. Heart Rate: 85. Ectopy: No Ectopy. Conduction: Normal. ST Segments: Normal ST Segments.   ECG Results              EKG 12-lead (Final result)  Result time 04/29/23 15:04:35      Final result by Interface, Lab In [facility] (04/29/23 15:04:35)                   Narrative:    Test Reason : R41.82,    Vent. Rate : 085 BPM     Atrial Rate : 085 BPM     P-R Int : 188 ms          QRS Dur : 084 ms      QT Int : 354 ms       P-R-T Axes : 004 020 000 degrees     QTc Int : 421 ms    Normal sinus rhythm  Low anterior forces- Cannot rule out Anterior infarct (cited on or before   15-AUG-2022)  Abnormal ECG  When compared with ECG of 15-AUG-2022 01:02,  No significant change was found  Confirmed by John EVANS MD ([id number]) on 4/29/2023 3:04:24 PM    Referred By: [email or web address]   SELF           Confirmed By:John EVANS MD                                  Imaging Results              X-Ray Chest AP Portable (Final result)  Result time 04/29/23  10:06:44      Final result by Melissa Bruner MD (04/29/23 10:06:44)                   Impression:      Right infiltrate or atelectasis with possible small bilateral pleural effusions      Electronically signed by: Melissa Bruner MD  Date:    04/29/2023  Time:    10:06               Narrative:    EXAMINATION:  XR CHEST AP PORTABLE    CLINICAL HISTORY:  cough;    COMPARISON:  08/15/2022    FINDINGS:  Right perihilar infiltrate versus atelectasis.  Possible small bilateral pleural effusions.  Upper lung zones are clear                                       Medications   aspirin tablet 325 mg (325 mg Oral Given 4/28/23 2221)   heparin 25,000 units in dextrose 5% (100 units/ml) IV bolus from bag INITIAL BOLUS (max bolus 4000 units) (4,000 Units Intravenous Bolus from Bag 4/28/23 2254)   0.9%  NaCl infusion (1,000 mLs Intravenous New Bag 4/28/23 2315)   insulin regular injection 10 Units 0.1 mL (10 Units Intravenous Given 4/28/23 2315)   cefTRIAXone (ROCEPHIN) 1 g in dextrose 5 % in water (D5W) 5 % 50 mL IVPB (MB+) (0 g Intravenous Stopped 4/29/23 0025)                 ED Course as of 05/02/23 0639   Fri Apr 28, 2023   2230 Labs and imaging noted.  NSTEMI.  Will give patient aspirin and aspirin.  Transfer patient to a facility with interventional cardiologist.  Patient remained chest pain free [HD]   2303 Patient has hyperglycemia with mild renal insufficiency without DKA.  Concerns for congestive heart failure.  Will provide gentle hydration.  Insulin. [HD]      ED Course User Index  [HD] Cullen Tariq MD                 Clinical Impression:   Final diagnoses:  [R41.82] Altered mental status  [I21.4] NSTEMI (non-ST elevated myocardial infarction) (Primary)  [R73.9] Hyperglycemia        ED Disposition Condition    Transfer to Another Facility Fair                Cullen Tariq MD  05/02/23 0678

## 2023-05-01 PROBLEM — Z51.5 COMFORT MEASURES ONLY STATUS: Status: ACTIVE | Noted: 2023-01-01

## 2023-05-01 PROBLEM — I24.89 DEMAND ISCHEMIA: Status: ACTIVE | Noted: 2023-01-01

## 2023-05-01 PROBLEM — I50.33 ACUTE ON CHRONIC DIASTOLIC HEART FAILURE: Status: ACTIVE | Noted: 2022-01-01

## 2025-03-13 NOTE — ED NOTES
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)  Left chest wound    Requested Facility: [facility]    Duration of Procedure: 1.5 hours    Anesthesia: GETA    Preferred Scheduling Dates/Times: 3/14/25    Type of Admit: She is currently an inpatient    To Assist: Carmen Carey NP    Position/Prep:  supine    Antibiotics Needed Pre-Op:  No    Special Equipment: Outpatient VAC supplies    Product Ordering Form: None    Post-operative plan: Home after the procedure    Pre-op visits: None Needed    Pre-Anesthesia Testing Considerations: None    Post Operative Appointment Needed In:  3 days with Wound care clinic for VAC change, 1 week with our office   "Report called to Kierra at [facility], Kierra states, "I will call Nhan to set up transport."  Pt notified.    "